# Patient Record
Sex: MALE | Race: WHITE | Employment: OTHER | URBAN - METROPOLITAN AREA
[De-identification: names, ages, dates, MRNs, and addresses within clinical notes are randomized per-mention and may not be internally consistent; named-entity substitution may affect disease eponyms.]

---

## 2020-04-05 ENCOUNTER — APPOINTMENT (OUTPATIENT)
Dept: CT IMAGING | Age: 65
End: 2020-04-05
Attending: EMERGENCY MEDICINE
Payer: MEDICARE

## 2020-04-05 ENCOUNTER — HOSPITAL ENCOUNTER (EMERGENCY)
Age: 65
Discharge: HOME | End: 2020-04-05
Attending: EMERGENCY MEDICINE
Payer: MEDICARE

## 2020-04-05 VITALS
HEART RATE: 99 BPM | OXYGEN SATURATION: 98 % | TEMPERATURE: 98.1 F | RESPIRATION RATE: 16 BRPM | DIASTOLIC BLOOD PRESSURE: 77 MMHG | SYSTOLIC BLOOD PRESSURE: 139 MMHG

## 2020-04-05 DIAGNOSIS — S09.90XA CLOSED HEAD INJURY, INITIAL ENCOUNTER: Primary | ICD-10-CM

## 2020-04-05 PROCEDURE — 90715 TDAP VACCINE 7 YRS/> IM: CPT | Performed by: EMERGENCY MEDICINE

## 2020-04-05 PROCEDURE — 74011250636 HC RX REV CODE- 250/636: Performed by: EMERGENCY MEDICINE

## 2020-04-05 PROCEDURE — 90471 IMMUNIZATION ADMIN: CPT

## 2020-04-05 PROCEDURE — 74011000250 HC RX REV CODE- 250: Performed by: EMERGENCY MEDICINE

## 2020-04-05 PROCEDURE — 70450 CT HEAD/BRAIN W/O DYE: CPT

## 2020-04-05 PROCEDURE — 99285 EMERGENCY DEPT VISIT HI MDM: CPT

## 2020-04-05 RX ORDER — BACITRACIN 500 UNIT/G
1 PACKET (EA) TOPICAL
Status: COMPLETED | OUTPATIENT
Start: 2020-04-05 | End: 2020-04-05

## 2020-04-05 RX ADMIN — TETANUS TOXOID, REDUCED DIPHTHERIA TOXOID AND ACELLULAR PERTUSSIS VACCINE, ADSORBED 0.5 ML: 5; 2.5; 8; 8; 2.5 SUSPENSION INTRAMUSCULAR at 11:58

## 2020-04-05 RX ADMIN — BACITRACIN 1 PACKET: 500 OINTMENT TOPICAL at 11:57

## 2020-04-05 NOTE — ED PROVIDER NOTES
HPI   The patient is a 68-year-old white male who had been drinking alcohol came back from the grocery store and fell striking his head and face on a sidewalk. It was uncertain if he had a loss of consciousness he has been awake and alert though somewhat intoxicated since then. He has abrasion to his right eyebrow and his nose is swollen but he has no other complaints at this time. He denies any neck pain or tenderness. Past Medical History:   Diagnosis Date    Depression     Hypertension     Stroke [facility])     tia behind his eye branch retina artery       History reviewed. No pertinent surgical history.       Family History:   Problem Relation Age of Onset    Colon Cancer Father     Heart Failure Father     Heart Disease Father     Diabetes Father     Diabetes Brother     Heart Attack Brother     Hypertension Brother     Diabetes Mother        Social History     Socioeconomic History    Marital status:      Spouse name: Not on file    Number of children: Not on file    Years of education: Not on file    Highest education level: Not on file   Occupational History    Not on file   Social Needs    Financial resource strain: Not on file    Food insecurity     Worry: Not on file     Inability: Not on file   [employer] needs     Medical: Not on file     Non-medical: Not on file   Tobacco Use    Smoking status: Never Smoker    Smokeless tobacco: Never Used   Substance and Sexual Activity    Alcohol use: Yes     Comment: pt states \"im not sure\"    Drug use: No    Sexual activity: Not on file   Lifestyle    Physical activity     Days per week: Not on file     Minutes per session: Not on file    Stress: Not on file   Relationships    Social connections     Talks on phone: Not on file     Gets together: Not on file     Attends Synagogue service: Not on file     Active member of club or organization: Not on file     Attends meetings of clubs or organizations: Not on file     Relationship status: Not on file    Intimate partner violence     Fear of current or ex partner: Not on file     Emotionally abused: Not on file     Physically abused: Not on file     Forced sexual activity: Not on file   Other Topics Concern    Not on file   Social History Narrative    Not on file         ALLERGIES: Patient has no known allergies. Review of Systems   All other systems reviewed and are negative. Vitals:    04/05/20 1132 04/05/20 1140   BP: (!) 152/91 (!) 154/93   Pulse: 99    Resp: 16    Temp: 98.1 °F (36.7 °C)    SpO2: 95% 93%            Physical Exam  Vitals signs reviewed. HENT:      Head: Normocephalic. Comments: There is a abrasion over his right forehead his nose is slightly tender but not deformed     Mouth/Throat:      Mouth: Mucous membranes are moist.   Eyes:      Pupils: Pupils are equal, round, and reactive to light. Musculoskeletal: Normal range of motion. Skin:     General: Skin is warm. Capillary Refill: Capillary refill takes less than 2 seconds. Neurological:      General: No focal deficit present. Mental Status: He is alert. Psychiatric:         Mood and Affect: Mood normal.         Thought Content:  Thought content normal.          MDM       Procedures

## 2020-04-05 NOTE — ED NOTES
Pt ambulated in landis gait steady. Pt offered ginger ale or water to drink, declined. Pt resting on stretcher respirations even and non labored. Call bell in reach, use explained.

## 2020-04-05 NOTE — DISCHARGE INSTRUCTIONS
Patient Education        Concussion in Children: Care Instructions  Your Care Instructions    A concussion is a kind of injury to the brain. It happens when the head or body receives a hard blow. The impact can jar or shake the brain against the skull. This interrupts the brain's normal activities. Although your child may have cuts or bruises on the head or face, he or she may have no other visible signs of a brain injury. Your child may not have a CT or MRI scan. Damage to the brain from a concussion can't be seen in these tests. Also, CT and MRI scans have risks. Any child who has had a concussion at a sports event needs to stop all activity and not return to play. Being active again before the brain recovers can raise your child's risk of having a more serious brain injury. For a few weeks, your child may have low energy, dizziness, trouble sleeping, a headache, ringing in the ears, or nausea. Your child may also feel anxious, grumpy, or depressed. He or she may have problems with memory and concentration. These symptoms are common after a concussion. They should slowly improve over time. Sometimes this takes weeks or even months. Follow-up care is a key part of your child's treatment and safety. Be sure to make and go to all appointments, and call your doctor if your child is having problems. It's also a good idea to know your child's test results and keep a list of the medicines your child takes. How can you care for your child at home? Pain control  · Use ice or a cold pack for 10 to 20 minutes at a time on the part of your child's head that hurts. Put a thin cloth between the ice and your child's skin. · Be safe with medicines. Read and follow all instructions on the label. ? If the doctor gave your child a prescription medicine for pain, give it as prescribed. ? Talk to your doctor before you give your child prescription or over-the-counter medicines like Tylenol.  Pain medicines can make headaches more likely. At home  · Help your child rest his or her body and brain. Most experts agree that children should rest for 1 to 2 days. Let your child know that rest--even though it can be hard--can speed up recovery. ? Pay close attention to symptoms as your child slowly returns to his or her regular routine. Avoid anything that makes symptoms worse or causes new ones. ? Make sure your child gets plenty of sleep. It may help to keep your child's room quiet, dark or dimly lit, and cool. Have your child go to bed and get up at the same time, and limit foods and drinks with caffeine. ? Limit housework, homework, and screen time. ? Avoid activities that could lead to another head injury. ? Follow your doctor's instructions for a gradual return to activity and sports. Back to school  · Wait until your child can focus for 30 to 45 minutes at a time before you send your child back to school. · Tell teachers, administrators, school counselors, and nurses what symptoms your child has or could develop. Sign a release form so the school can coordinate care with your child's doctor. · Arrange for any special changes your child needs. For example, depending on symptoms, your child may need to:  ? Start back to school with shorter days. ? Take 15-minute breaks after every 30 minutes of classwork.  ? Have more time for assignments, postpone tests, or have another student take notes. ? Avoid bright lights. (You can suggest dimmed lighting or that your child wear sunglasses.)  ? Avoid noisy places, like the gym or cafeteria. · Check in with school staff often. Discuss how your child is doing, academically and emotionally. A concussion can make kids grouchy and emotional. And needing extra help or extra rest can be hard for some kids. · If your child doesn't recover within 3 to 4 weeks, talk with your doctor and the school staff. They may recommend a 504 plan.  It's a plan for kids who need ongoing adjustments at school. How should your child return to play? Doctors and concussion specialists suggest steps to follow for returning to sports after a concussion. Use these steps as a guide. In most places, your doctor must give you written permission for your child to begin the steps and return to sports. This means that your child must have no symptoms, is back to school, and is no longer taking medicines for the concussion. Your child should slowly progress through the following levels of activity:  1. Limited activity. Your child can take part in daily activities as long as the activity doesn't increase his or her symptoms or cause new symptoms. 2. Light aerobic activity. This can include walking, swimming, or other exercise at less than 70% of your child's maximum heart rate. No resistance training is included in this step. 3. Sport-specific exercise. This includes running drills or skating drills (depending on the sport), but no head impact. 4. Noncontact training drills. This includes more complex training drills such as passing. Your child may also begin light resistance training. 5. Full-contact practice. Your child can participate in normal training. 6. Return to normal game play. This is the final step and allows your child to join in normal game play. Watch and keep track of your child's progress. It should take at least 6 days for your child to go from light activity to normal game play. Make sure that your child can stay at each new level of activity for at least 24 hours without symptoms, or as long as your doctor says, before doing more. If one or more symptoms come back, have your child return to a lower level of activity for at least 24 hours. He or she should not move on until all symptoms are gone. When should you call for help? Call 911 anytime you think your child may need emergency care. For example, call if:    · Your child has a seizure.     · Your child passes out (loses consciousness).      · Your child is confused or hard to wake up.   [facility] your doctor now or seek immediate medical care if:    · Your child has new or worse vomiting.     · Your child seems less alert.     · Your child has new weakness or numbness in any part of the body.    Watch closely for changes in your child's health, and be sure to contact your doctor if:    · Your child does not get better as expected.     · Your child has new symptoms, such as headaches, trouble concentrating, or changes in mood. Where can you learn more? Go to http://carolyn-enrico.info/  Enter R145 in the search box to learn more about \"Concussion in Children: Care Instructions. \"  Current as of: November 19, 2019Content Version: 12.4  © 1494-3635 Healthwise, Incorporated. Care instructions adapted under license by [employer] (which disclaims liability or warranty for this information). If you have questions about a medical condition or this instruction, always ask your healthcare professional. [place] [address] any warranty or liability for your use of this information. Patient Education        Learning About a Closed Head Injury  What is a closed head injury? A closed head injury happens when your head gets hit hard. The strong force of the blow causes your brain to shake in your skull. This movement can cause the brain to bruise, swell, or tear. Sometimes nerves or blood vessels also get damaged. This can cause bleeding in or around the brain. A concussion is a type of closed head injury. What are the symptoms? If you have a mild concussion, you may have a mild headache or feel \"not quite right. \" These symptoms are common. They usually go away over a few days to 4 weeks. But sometimes after a concussion, you feel like you can't function as well as before the injury. And you have new symptoms. This is called postconcussive syndrome.  You may:  · Find it harder to solve problems, think, concentrate, or remember. · Have headaches. · Have changes in your sleep patterns, such as not being able to sleep or sleeping all the time. · Have changes in your personality. · Not be interested in your usual activities. · Feel angry or anxious without a clear reason. · Lose your sense of taste or smell. · Be dizzy, lightheaded, or unsteady. It may be hard to stand or walk. How is a closed head injury treated? Any person who may have a concussion needs to see a doctor. Some people have to stay in the hospital to be watched. Others can go home safely. If you go home, follow your doctor's instructions. He or she will tell you if you need someone to watch you closely for the next 24 hours or longer. Rest is the best treatment. Get plenty of sleep at night. And try to rest during the day. · Avoid activities that are physically or mentally demanding. These include housework, exercise, and schoolwork. And don't play video games, send text messages, or use the computer. You may need to change your school or work schedule to be able to avoid these activities. · Ask your doctor when it's okay to drive, ride a bike, or operate machinery. · Take an over-the-counter pain medicine, such as acetaminophen (Tylenol), ibuprofen (Advil, Motrin), or naproxen (Aleve). Be safe with medicines. Read and follow all instructions on the label. · Check with your doctor before you use any other medicines for pain. · Do not drink alcohol or use illegal drugs. They can slow recovery. They can also increase your risk of getting a second head injury. Follow-up care is a key part of your treatment and safety. Be sure to make and go to all appointments, and call your doctor if you are having problems. It's also a good idea to know your test results and keep a list of the medicines you take. Where can you learn more?   Go to http://carolyn-enrico.info/  Enter E235 in the search box to learn more about \"Learning About a Closed Head Injury. \"  Current as of: November 19, 2019Content Version: 12.4  © 4137-9178 [employer]. Care instructions adapted under license by [employer] (which disclaims liability or warranty for this information). If you have questions about a medical condition or this instruction, always ask your healthcare professional. [place] [address] any warranty or liability for your use of this information.

## 2020-04-05 NOTE — ED NOTES
Pt was discharged and given instructions by Dr Gulshan Dee. Pt verbalized good understanding of all discharge instructions, and F/U care. All questions answered. Pt is awaiting cab ride home. Pt  Is in  stable condition for  discharge.

## 2020-04-05 NOTE — ED TRIAGE NOTES
Pt arrived via [employer] squad report of \"pt has been drinking alcohol this morning. Pt was seen falling on concrete and 911 was called. \" Pt denies passing out. Pt denies taking blood thinners. Pt has bruising and swelling with abrasions to right face and forehead appears calm and in no distress at this time.

## 2022-02-25 ENCOUNTER — HOSPITAL ENCOUNTER (OUTPATIENT)
Dept: RADIATION THERAPY | Age: 67
Discharge: HOME | End: 2022-02-25

## 2022-07-13 ENCOUNTER — HOSPITAL ENCOUNTER (OUTPATIENT)
Dept: RADIATION THERAPY | Age: 67
Discharge: HOME | End: 2022-07-13

## 2022-07-13 VITALS — HEIGHT: 71 IN | WEIGHT: 216 LBS | BODY MASS INDEX: 30.24 KG/M2

## 2022-07-13 RX ORDER — HYDROXYZINE HYDROCHLORIDE 10 MG/1
10 TABLET, FILM COATED ORAL
COMMUNITY

## 2022-07-13 RX ORDER — FOLIC ACID 1 MG/1
1 TABLET ORAL DAILY
COMMUNITY

## 2022-07-13 RX ORDER — METOPROLOL TARTRATE 50 MG/1
50 TABLET ORAL 2 TIMES DAILY
COMMUNITY

## 2023-01-06 ENCOUNTER — HOSPITAL ENCOUNTER (OUTPATIENT)
Dept: RADIATION THERAPY | Age: 68
Discharge: HOME | End: 2023-01-06

## 2023-07-25 ENCOUNTER — HOSPITAL ENCOUNTER (OUTPATIENT)
Facility: HOSPITAL | Age: 68
Discharge: HOME | End: 2023-07-28

## 2024-02-16 ENCOUNTER — OFFICE VISIT (OUTPATIENT)
Facility: CLINIC | Age: 69
End: 2024-02-16
Payer: MEDICARE

## 2024-02-16 VITALS
HEART RATE: 90 BPM | TEMPERATURE: 98 F | OXYGEN SATURATION: 99 % | HEIGHT: 71 IN | SYSTOLIC BLOOD PRESSURE: 137 MMHG | BODY MASS INDEX: 30.52 KG/M2 | WEIGHT: 218 LBS | DIASTOLIC BLOOD PRESSURE: 85 MMHG | RESPIRATION RATE: 20 BRPM

## 2024-02-16 DIAGNOSIS — E78.2 MIXED HYPERLIPIDEMIA: ICD-10-CM

## 2024-02-16 DIAGNOSIS — Z85.46 PERSONAL HISTORY OF PROSTATE CANCER: ICD-10-CM

## 2024-02-16 DIAGNOSIS — R01.1 HEART MURMUR, SYSTOLIC: ICD-10-CM

## 2024-02-16 DIAGNOSIS — I10 PRIMARY HYPERTENSION: Primary | ICD-10-CM

## 2024-02-16 DIAGNOSIS — Z87.898 HISTORY OF ALCOHOL USE DISORDER: ICD-10-CM

## 2024-02-16 DIAGNOSIS — L40.9 PSORIASIS: ICD-10-CM

## 2024-02-16 DIAGNOSIS — E55.9 VITAMIN D DEFICIENCY: ICD-10-CM

## 2024-02-16 PROCEDURE — G8417 CALC BMI ABV UP PARAM F/U: HCPCS | Performed by: FAMILY MEDICINE

## 2024-02-16 PROCEDURE — G8427 DOCREV CUR MEDS BY ELIG CLIN: HCPCS | Performed by: FAMILY MEDICINE

## 2024-02-16 PROCEDURE — 1123F ACP DISCUSS/DSCN MKR DOCD: CPT | Performed by: FAMILY MEDICINE

## 2024-02-16 PROCEDURE — 3017F COLORECTAL CA SCREEN DOC REV: CPT | Performed by: FAMILY MEDICINE

## 2024-02-16 PROCEDURE — G8484 FLU IMMUNIZE NO ADMIN: HCPCS | Performed by: FAMILY MEDICINE

## 2024-02-16 PROCEDURE — 3079F DIAST BP 80-89 MM HG: CPT | Performed by: FAMILY MEDICINE

## 2024-02-16 PROCEDURE — 99204 OFFICE O/P NEW MOD 45 MIN: CPT | Performed by: FAMILY MEDICINE

## 2024-02-16 PROCEDURE — 3075F SYST BP GE 130 - 139MM HG: CPT | Performed by: FAMILY MEDICINE

## 2024-02-16 PROCEDURE — 4004F PT TOBACCO SCREEN RCVD TLK: CPT | Performed by: FAMILY MEDICINE

## 2024-02-16 RX ORDER — METOPROLOL TARTRATE 50 MG/1
50 TABLET, FILM COATED ORAL 2 TIMES DAILY
Qty: 180 TABLET | Refills: 3 | Status: SHIPPED | OUTPATIENT
Start: 2024-02-16

## 2024-02-16 RX ORDER — VALSARTAN 80 MG/1
80 TABLET ORAL DAILY
Qty: 90 TABLET | Refills: 3 | Status: SHIPPED | OUTPATIENT
Start: 2024-02-16

## 2024-02-16 RX ORDER — ACETAMINOPHEN 160 MG
TABLET,DISINTEGRATING ORAL
COMMUNITY

## 2024-02-16 RX ORDER — AMLODIPINE BESYLATE 10 MG/1
10 TABLET ORAL NIGHTLY
Qty: 90 TABLET | Refills: 3 | Status: SHIPPED | OUTPATIENT
Start: 2024-02-16

## 2024-02-16 NOTE — ASSESSMENT & PLAN NOTE
Likely sclerotic aortic valve. Asymptomatic. Recent imaging reassuring. Continue to monitor, consider re-imaging if having new symptoms or worsening murmur.

## 2024-02-16 NOTE — PROGRESS NOTES
Chief Complaint   Patient presents with    New Patient     Gen health is good has no issues or concerns at this time.

## 2024-02-16 NOTE — PROGRESS NOTES
Family Medicine Initial Office Visit  Patient: Jose Mc  1955, 68 y.o., male  Encounter Date: 2/16/2024    ASSESSMENT & PLAN  Jose Mc is a 68 y.o. male who presented for established care with below concerns:    1. Primary hypertension  Overview:  BP Readings from Last 3 Encounters:   02/16/24 137/85     Medication: Valsartan 80mg daily, metoprolol tartrate 50mg BID, amlodipine 10mg daily  Assessment & Plan:   Well-controlled, continue current medications and continue current treatment plan  Orders:  -     metoprolol tartrate (LOPRESSOR) 50 MG tablet; Take 1 tablet by mouth 2 times daily, Disp-180 tablet, R-3Normal  -     valsartan (DIOVAN) 80 MG tablet; Take 1 tablet by mouth daily, Disp-90 tablet, R-3Normal  -     amLODIPine (NORVASC) 10 MG tablet; Take 1 tablet by mouth nightly, Disp-90 tablet, R-3Normal  -     Thyroid Cascade Profile; Future  -     Comprehensive Metabolic Panel; Future  -     CBC; Future  2. Mixed hyperlipidemia  Overview:  No results found for: \"CHOL\"  No results found for: \"TRIG\"  No results found for: \"HDL\"  No results found for: \"LDLCHOLESTEROL\", \"LDLCALC\"  No results found for: \"VLDL\"  No results found for: \"CHOLHDLRATIO\"    Current Meds: none  Previous Meds: simvastatin 40mg daily  Assessment & Plan:   Unclear control, continue current plan pending work up below  Orders:  -     Lipid Panel; Future  3. History of alcohol use disorder  Overview:  Sober since approximately 2022.      Assessment & Plan:   Well-controlled, continue current treatment plan  4. Personal history of prostate cancer  Overview:  Dx and treated in 2022, in remission x 1 year.     Specialist: [facility] Dr. Friedman    Interval Hx:  - doing well, in remission, PSA went from 11 to 0.5 with radiation.  - no residual symptoms.  Assessment & Plan:   Monitored by specialist- no acute findings meriting change in the plan  5. Psoriasis  Overview:  R lower leg, shoulders, scalp, elbows.    Medication: coconut

## 2024-02-16 NOTE — ASSESSMENT & PLAN NOTE
Well-controlled, continue current medications. Offered betamethasone cream but not interested at this time, will let me know.

## 2024-02-29 DIAGNOSIS — I10 PRIMARY HYPERTENSION: ICD-10-CM

## 2024-02-29 DIAGNOSIS — E78.2 MIXED HYPERLIPIDEMIA: ICD-10-CM

## 2024-02-29 DIAGNOSIS — E55.9 VITAMIN D DEFICIENCY: ICD-10-CM

## 2024-03-01 LAB
25(OH)D3 SERPL-MCNC: 44.6 NG/ML (ref 30–100)
ALBUMIN SERPL-MCNC: 3.4 G/DL (ref 3.5–5)
ALBUMIN/GLOB SERPL: 0.8 (ref 1.1–2.2)
ALP SERPL-CCNC: 78 U/L (ref 45–117)
ALT SERPL-CCNC: 29 U/L (ref 12–78)
ANION GAP SERPL CALC-SCNC: 6 MMOL/L (ref 5–15)
AST SERPL-CCNC: 24 U/L (ref 15–37)
BILIRUB SERPL-MCNC: 0.9 MG/DL (ref 0.2–1)
BUN SERPL-MCNC: 14 MG/DL (ref 6–20)
BUN/CREAT SERPL: 13 (ref 12–20)
CALCIUM SERPL-MCNC: 8.7 MG/DL (ref 8.5–10.1)
CHLORIDE SERPL-SCNC: 101 MMOL/L (ref 97–108)
CHOLEST SERPL-MCNC: 221 MG/DL
CO2 SERPL-SCNC: 27 MMOL/L (ref 21–32)
CREAT SERPL-MCNC: 1.08 MG/DL (ref 0.7–1.3)
ERYTHROCYTE [DISTWIDTH] IN BLOOD BY AUTOMATED COUNT: 14.8 % (ref 11.5–14.5)
GLOBULIN SER CALC-MCNC: 4.3 G/DL (ref 2–4)
GLUCOSE SERPL-MCNC: 129 MG/DL (ref 65–100)
HCT VFR BLD AUTO: 40.3 % (ref 36.6–50.3)
HDLC SERPL-MCNC: 61 MG/DL
HDLC SERPL: 3.6 (ref 0–5)
HGB BLD-MCNC: 13.4 G/DL (ref 12.1–17)
LDLC SERPL CALC-MCNC: 136 MG/DL (ref 0–100)
MCH RBC QN AUTO: 31.5 PG (ref 26–34)
MCHC RBC AUTO-ENTMCNC: 33.3 G/DL (ref 30–36.5)
MCV RBC AUTO: 94.6 FL (ref 80–99)
NRBC # BLD: 0 K/UL (ref 0–0.01)
NRBC BLD-RTO: 0 PER 100 WBC
PLATELET # BLD AUTO: 210 K/UL (ref 150–400)
PMV BLD AUTO: 10.8 FL (ref 8.9–12.9)
POTASSIUM SERPL-SCNC: 3.7 MMOL/L (ref 3.5–5.1)
PROT SERPL-MCNC: 7.7 G/DL (ref 6.4–8.2)
RBC # BLD AUTO: 4.26 M/UL (ref 4.1–5.7)
SODIUM SERPL-SCNC: 134 MMOL/L (ref 136–145)
TRIGL SERPL-MCNC: 120 MG/DL
VLDLC SERPL CALC-MCNC: 24 MG/DL
WBC # BLD AUTO: 7.5 K/UL (ref 4.1–11.1)

## 2024-03-02 LAB — TSH SERPL DL<=0.05 MIU/L-ACNC: 3.29 UIU/ML (ref 0.45–4.5)

## 2024-03-06 NOTE — RESULT ENCOUNTER NOTE
Please contact patient to let him know cholesterol is high and sugars are high (prediabetes). I would like him to repeat labs in 6 months after working on diet changes to low carbohydrate, healthy fats, high vegetable and fruit diet. Refer him to mediterranean diet and high fiber diet. Labs will be placed for 6 months out.

## 2024-04-01 DIAGNOSIS — C61 MALIGNANT NEOPLASM OF PROSTATE (HCC): Primary | ICD-10-CM

## 2024-05-16 ENCOUNTER — HOSPITAL ENCOUNTER (OUTPATIENT)
Facility: HOSPITAL | Age: 69
End: 2024-05-16

## 2024-05-16 VITALS — DIASTOLIC BLOOD PRESSURE: 96 MMHG | BODY MASS INDEX: 29.29 KG/M2 | SYSTOLIC BLOOD PRESSURE: 156 MMHG | WEIGHT: 210 LBS

## 2024-05-16 DIAGNOSIS — C61 MALIGNANT NEOPLASM OF PROSTATE (HCC): Primary | ICD-10-CM

## 2024-05-16 ASSESSMENT — PAIN SCALES - GENERAL: PAINLEVEL_OUTOF10: 0

## 2024-05-16 NOTE — PROGRESS NOTES
Cancer [place] [facility]  [facility]      RADIATION ONCOLOGY FOLLOW-UP VISIT NOTE     Encounter Date: 05/16/24   Patient Name: Jose Mc  YOB: 1955  Medical Record Number: [id number]  Referring Physician: Kentrell Friedman MD  Primary Care Provider: Lupillo Hobbs MD      DIAGNOSIS:       ICD-10-CM    1. Malignant neoplasm of prostate (HCC)  C61         STAGING:    Cancer Staging   Malignant neoplasm of prostate (HCC)  Staging form: Prostate, AJCC 8th Edition  - Clinical stage from 11/29/2021: Stage IIA (cT2b, cN0, cM0, PSA: 9.9, Grade Group: 1) - Signed by Glen Gutierrez MD on 5/16/2024  AJCC Staging has been reviewed      ONCOLOGIC HISTORY:   PROSTATE CANCER HISTORY:  Patient initially seen by urology in 10/2021 for an elevated PSA.  PSA was 7.3 in 7/2021 and then 9.88 in 10/2021.  He denies a family history of prostate cancer.  Had slightly abnormal TERESITA with subtly more prominent right mid gland.  History of HTN and alcoholism.    11/29/21 - TRUS 12 core prostate biopsy shows a prostate volume of 37.81cc, cT2b (firm over entire right side), PSA density 0.26:        - Right apex medial: benign       - Right apex lateral:   adenocarcinoma, [place] 3+3=6 involving <5% of 1/1 core.       - Right mid medial:   adenocarcinoma, [place] 3+3=6 involving 10% of 1/1 core.       - Right mid lateral:   adenocarcinoma, [place] 3+3=6 involving 20% of 1/1 core. PNI+       - Right base medial:  benign       - Right base lateral:   benign       - Left apex medial: benign       - Left apex lateral:   adenocarcinoma, [place] 3+3=6 involving <5% of 1/1 core.       - Left mid medial: benign       - Left mid lateral: adenocarcinoma, [place] 3+3=6 involving <5% of 1/1 core.       - Left base medial:  benign       - Left base lateral:  benign  Patient elects active surveillance.  1/6/22 - Oncotype Dx GPS 30 (30% risk of adverse pathology).  6/10/22 - MRI pelvis ([facility]) shows a

## 2025-01-31 ENCOUNTER — HOSPITAL ENCOUNTER (INPATIENT)
Facility: HOSPITAL | Age: 70
LOS: 4 days | Discharge: ANOTHER HOSPITAL | DRG: 871 | End: 2025-02-04
Attending: STUDENT IN AN ORGANIZED HEALTH CARE EDUCATION/TRAINING PROGRAM | Admitting: FAMILY MEDICINE
Payer: MEDICARE

## 2025-01-31 ENCOUNTER — APPOINTMENT (OUTPATIENT)
Facility: HOSPITAL | Age: 70
DRG: 871 | End: 2025-01-31
Payer: MEDICARE

## 2025-01-31 DIAGNOSIS — J90 PLEURAL EFFUSION: ICD-10-CM

## 2025-01-31 DIAGNOSIS — J18.9 COMMUNITY ACQUIRED PNEUMONIA OF RIGHT LUNG, UNSPECIFIED PART OF LUNG: Primary | ICD-10-CM

## 2025-01-31 DIAGNOSIS — I27.82 CHRONIC PULMONARY EMBOLISM, UNSPECIFIED PULMONARY EMBOLISM TYPE, UNSPECIFIED WHETHER ACUTE COR PULMONALE PRESENT (HCC): ICD-10-CM

## 2025-01-31 LAB
ALBUMIN SERPL-MCNC: 1.4 G/DL (ref 3.5–5)
ALBUMIN SERPL-MCNC: 1.6 G/DL (ref 3.5–5)
ALBUMIN/GLOB SERPL: 0.3 (ref 1.1–2.2)
ALBUMIN/GLOB SERPL: 0.3 (ref 1.1–2.2)
ALP SERPL-CCNC: 74 U/L (ref 45–117)
ALP SERPL-CCNC: 93 U/L (ref 45–117)
ALT SERPL-CCNC: 11 U/L (ref 12–78)
ALT SERPL-CCNC: 12 U/L (ref 12–78)
ANION GAP SERPL CALC-SCNC: 11 MMOL/L (ref 2–12)
ANION GAP SERPL CALC-SCNC: 16 MMOL/L (ref 2–12)
APPEARANCE UR: CLEAR
APTT PPP: 29.4 SEC (ref 22.1–31)
AST SERPL-CCNC: 11 U/L (ref 15–37)
AST SERPL-CCNC: 12 U/L (ref 15–37)
BACTERIA URNS QL MICRO: NEGATIVE /HPF
BASOPHILS # BLD: 0.06 K/UL (ref 0–0.1)
BASOPHILS NFR BLD: 0.5 % (ref 0–1)
BILIRUB SERPL-MCNC: 0.6 MG/DL (ref 0.2–1)
BILIRUB SERPL-MCNC: 0.6 MG/DL (ref 0.2–1)
BILIRUB UR QL: NEGATIVE
BUN SERPL-MCNC: 11 MG/DL (ref 6–20)
BUN SERPL-MCNC: 12 MG/DL (ref 6–20)
BUN/CREAT SERPL: 13 (ref 12–20)
BUN/CREAT SERPL: 16 (ref 12–20)
CALCIUM SERPL-MCNC: 8.4 MG/DL (ref 8.5–10.1)
CALCIUM SERPL-MCNC: 8.6 MG/DL (ref 8.5–10.1)
CHLORIDE SERPL-SCNC: 103 MMOL/L (ref 97–108)
CHLORIDE SERPL-SCNC: 109 MMOL/L (ref 97–108)
CO2 SERPL-SCNC: 22 MMOL/L (ref 21–32)
CO2 SERPL-SCNC: 22 MMOL/L (ref 21–32)
COLOR UR: ABNORMAL
COMMENT:: NORMAL
CREAT SERPL-MCNC: 0.7 MG/DL (ref 0.7–1.3)
CREAT SERPL-MCNC: 0.9 MG/DL (ref 0.7–1.3)
D DIMER PPP FEU-MCNC: 3.6 MG/L FEU (ref 0–0.65)
DIFFERENTIAL METHOD BLD: ABNORMAL
EOSINOPHIL # BLD: 0.03 K/UL (ref 0–0.4)
EOSINOPHIL NFR BLD: 0.2 % (ref 0–7)
EPITH CASTS URNS QL MICRO: ABNORMAL /LPF
ERYTHROCYTE [DISTWIDTH] IN BLOOD BY AUTOMATED COUNT: 16.6 % (ref 11.5–14.5)
ERYTHROCYTE [DISTWIDTH] IN BLOOD BY AUTOMATED COUNT: 16.6 % (ref 11.5–14.5)
GLOBULIN SER CALC-MCNC: 5.2 G/DL (ref 2–4)
GLOBULIN SER CALC-MCNC: 6.1 G/DL (ref 2–4)
GLUCOSE SERPL-MCNC: 70 MG/DL (ref 65–100)
GLUCOSE SERPL-MCNC: 84 MG/DL (ref 65–100)
GLUCOSE UR STRIP.AUTO-MCNC: NEGATIVE MG/DL
HCT VFR BLD AUTO: 28.8 % (ref 36.6–50.3)
HCT VFR BLD AUTO: 30.4 % (ref 36.6–50.3)
HGB BLD-MCNC: 8.5 G/DL (ref 12.1–17)
HGB BLD-MCNC: 9.2 G/DL (ref 12.1–17)
HGB UR QL STRIP: NEGATIVE
HYALINE CASTS URNS QL MICRO: ABNORMAL /LPF (ref 0–2)
IMM GRANULOCYTES # BLD AUTO: 0.11 K/UL (ref 0–0.04)
IMM GRANULOCYTES NFR BLD AUTO: 0.8 % (ref 0–0.5)
INR PPP: 1.2 (ref 0.9–1.1)
KETONES UR QL STRIP.AUTO: 15 MG/DL
LACTATE SERPL-SCNC: 1.4 MMOL/L (ref 0.4–2)
LACTATE SERPL-SCNC: 2.2 MMOL/L (ref 0.4–2)
LEUKOCYTE ESTERASE UR QL STRIP.AUTO: NEGATIVE
LYMPHOCYTES # BLD: 0.91 K/UL (ref 0.8–3.5)
LYMPHOCYTES NFR BLD: 6.9 % (ref 12–49)
MCH RBC QN AUTO: 26.2 PG (ref 26–34)
MCH RBC QN AUTO: 26.5 PG (ref 26–34)
MCHC RBC AUTO-ENTMCNC: 29.5 G/DL (ref 30–36.5)
MCHC RBC AUTO-ENTMCNC: 30.3 G/DL (ref 30–36.5)
MCV RBC AUTO: 87.6 FL (ref 80–99)
MCV RBC AUTO: 88.6 FL (ref 80–99)
MONOCYTES # BLD: 0.83 K/UL (ref 0–1)
MONOCYTES NFR BLD: 6.3 % (ref 5–13)
NEUTS SEG # BLD: 11.28 K/UL (ref 1.8–8)
NEUTS SEG NFR BLD: 85.3 % (ref 32–75)
NITRITE UR QL STRIP.AUTO: NEGATIVE
NRBC # BLD: 0 K/UL (ref 0–0.01)
NRBC # BLD: 0 K/UL (ref 0–0.01)
NRBC BLD-RTO: 0 PER 100 WBC
NRBC BLD-RTO: 0 PER 100 WBC
NT PRO BNP: 1512 PG/ML (ref 0–125)
PH UR STRIP: 5 (ref 5–8)
PLATELET # BLD AUTO: 369 K/UL (ref 150–400)
PLATELET # BLD AUTO: 391 K/UL (ref 150–400)
PMV BLD AUTO: 10 FL (ref 8.9–12.9)
PMV BLD AUTO: 9.7 FL (ref 8.9–12.9)
POTASSIUM SERPL-SCNC: 3.4 MMOL/L (ref 3.5–5.1)
POTASSIUM SERPL-SCNC: 3.6 MMOL/L (ref 3.5–5.1)
PROT SERPL-MCNC: 6.6 G/DL (ref 6.4–8.2)
PROT SERPL-MCNC: 7.7 G/DL (ref 6.4–8.2)
PROT UR STRIP-MCNC: 30 MG/DL
PROTHROMBIN TIME: 12.7 SEC (ref 9.2–11.2)
RBC # BLD AUTO: 3.25 M/UL (ref 4.1–5.7)
RBC # BLD AUTO: 3.47 M/UL (ref 4.1–5.7)
RBC #/AREA URNS HPF: ABNORMAL /HPF (ref 0–5)
SODIUM SERPL-SCNC: 141 MMOL/L (ref 136–145)
SODIUM SERPL-SCNC: 142 MMOL/L (ref 136–145)
SP GR UR REFRACTOMETRY: 1.01 (ref 1–1.03)
SPECIMEN HOLD: NORMAL
THERAPEUTIC RANGE: NORMAL SECS (ref 58–77)
TROPONIN I SERPL HS-MCNC: 13 NG/L (ref 0–76)
TROPONIN I SERPL HS-MCNC: 14 NG/L (ref 0–76)
UFH PPP CHRO-ACNC: <0.1 IU/ML
URINE CULTURE IF INDICATED: ABNORMAL
UROBILINOGEN UR QL STRIP.AUTO: 2 EU/DL (ref 0.2–1)
WBC # BLD AUTO: 13.2 K/UL (ref 4.1–11.1)
WBC # BLD AUTO: 13.2 K/UL (ref 4.1–11.1)
WBC URNS QL MICRO: ABNORMAL /HPF (ref 0–4)

## 2025-01-31 PROCEDURE — 36415 COLL VENOUS BLD VENIPUNCTURE: CPT

## 2025-01-31 PROCEDURE — 85379 FIBRIN DEGRADATION QUANT: CPT

## 2025-01-31 PROCEDURE — 85520 HEPARIN ASSAY: CPT

## 2025-01-31 PROCEDURE — 87040 BLOOD CULTURE FOR BACTERIA: CPT

## 2025-01-31 PROCEDURE — 93005 ELECTROCARDIOGRAM TRACING: CPT | Performed by: FAMILY MEDICINE

## 2025-01-31 PROCEDURE — 83605 ASSAY OF LACTIC ACID: CPT

## 2025-01-31 PROCEDURE — 2060000000 HC ICU INTERMEDIATE R&B

## 2025-01-31 PROCEDURE — 6370000000 HC RX 637 (ALT 250 FOR IP)

## 2025-01-31 PROCEDURE — 85610 PROTHROMBIN TIME: CPT

## 2025-01-31 PROCEDURE — 80053 COMPREHEN METABOLIC PANEL: CPT

## 2025-01-31 PROCEDURE — 6360000002 HC RX W HCPCS

## 2025-01-31 PROCEDURE — 6360000002 HC RX W HCPCS: Performed by: STUDENT IN AN ORGANIZED HEALTH CARE EDUCATION/TRAINING PROGRAM

## 2025-01-31 PROCEDURE — 83880 ASSAY OF NATRIURETIC PEPTIDE: CPT

## 2025-01-31 PROCEDURE — 2580000003 HC RX 258

## 2025-01-31 PROCEDURE — 85025 COMPLETE CBC W/AUTO DIFF WBC: CPT

## 2025-01-31 PROCEDURE — 85730 THROMBOPLASTIN TIME PARTIAL: CPT

## 2025-01-31 PROCEDURE — 81001 URINALYSIS AUTO W/SCOPE: CPT

## 2025-01-31 PROCEDURE — 85027 COMPLETE CBC AUTOMATED: CPT

## 2025-01-31 PROCEDURE — 84484 ASSAY OF TROPONIN QUANT: CPT

## 2025-01-31 PROCEDURE — 71045 X-RAY EXAM CHEST 1 VIEW: CPT

## 2025-01-31 PROCEDURE — 2580000003 HC RX 258: Performed by: STUDENT IN AN ORGANIZED HEALTH CARE EDUCATION/TRAINING PROGRAM

## 2025-01-31 PROCEDURE — 99285 EMERGENCY DEPT VISIT HI MDM: CPT

## 2025-01-31 PROCEDURE — 6360000004 HC RX CONTRAST MEDICATION: Performed by: FAMILY MEDICINE

## 2025-01-31 PROCEDURE — 93005 ELECTROCARDIOGRAM TRACING: CPT | Performed by: STUDENT IN AN ORGANIZED HEALTH CARE EDUCATION/TRAINING PROGRAM

## 2025-01-31 PROCEDURE — 93005 ELECTROCARDIOGRAM TRACING: CPT

## 2025-01-31 PROCEDURE — 71275 CT ANGIOGRAPHY CHEST: CPT

## 2025-01-31 PROCEDURE — 2500000003 HC RX 250 WO HCPCS

## 2025-01-31 RX ORDER — 0.9 % SODIUM CHLORIDE 0.9 %
500 INTRAVENOUS SOLUTION INTRAVENOUS ONCE
Status: COMPLETED | OUTPATIENT
Start: 2025-01-31 | End: 2025-01-31

## 2025-01-31 RX ORDER — IOPAMIDOL 755 MG/ML
100 INJECTION, SOLUTION INTRAVASCULAR
Status: CANCELLED | OUTPATIENT
Start: 2025-01-31

## 2025-01-31 RX ORDER — SODIUM CHLORIDE, SODIUM LACTATE, POTASSIUM CHLORIDE, AND CALCIUM CHLORIDE .6; .31; .03; .02 G/100ML; G/100ML; G/100ML; G/100ML
1000 INJECTION, SOLUTION INTRAVENOUS ONCE
Status: COMPLETED | OUTPATIENT
Start: 2025-01-31 | End: 2025-01-31

## 2025-01-31 RX ORDER — HEPARIN SODIUM 1000 [USP'U]/ML
4000 INJECTION, SOLUTION INTRAVENOUS; SUBCUTANEOUS ONCE
Status: COMPLETED | OUTPATIENT
Start: 2025-01-31 | End: 2025-01-31

## 2025-01-31 RX ORDER — SODIUM CHLORIDE 9 MG/ML
INJECTION, SOLUTION INTRAVENOUS PRN
Status: DISCONTINUED | OUTPATIENT
Start: 2025-01-31 | End: 2025-02-04 | Stop reason: HOSPADM

## 2025-01-31 RX ORDER — ATORVASTATIN CALCIUM 20 MG/1
20 TABLET, FILM COATED ORAL
Status: DISCONTINUED | OUTPATIENT
Start: 2025-01-31 | End: 2025-02-04 | Stop reason: HOSPADM

## 2025-01-31 RX ORDER — HEPARIN SODIUM 1000 [USP'U]/ML
4000 INJECTION, SOLUTION INTRAVENOUS; SUBCUTANEOUS PRN
Status: DISCONTINUED | OUTPATIENT
Start: 2025-01-31 | End: 2025-02-04 | Stop reason: HOSPADM

## 2025-01-31 RX ORDER — ONDANSETRON 2 MG/ML
4 INJECTION INTRAMUSCULAR; INTRAVENOUS EVERY 6 HOURS PRN
Status: DISCONTINUED | OUTPATIENT
Start: 2025-01-31 | End: 2025-02-04 | Stop reason: HOSPADM

## 2025-01-31 RX ORDER — SODIUM CHLORIDE 0.9 % (FLUSH) 0.9 %
5-40 SYRINGE (ML) INJECTION EVERY 12 HOURS SCHEDULED
Status: DISCONTINUED | OUTPATIENT
Start: 2025-01-31 | End: 2025-02-04 | Stop reason: HOSPADM

## 2025-01-31 RX ORDER — HEPARIN SODIUM 1000 [USP'U]/ML
2000 INJECTION, SOLUTION INTRAVENOUS; SUBCUTANEOUS PRN
Status: DISCONTINUED | OUTPATIENT
Start: 2025-01-31 | End: 2025-02-04 | Stop reason: HOSPADM

## 2025-01-31 RX ORDER — ACETAMINOPHEN 325 MG/1
650 TABLET ORAL EVERY 6 HOURS PRN
Status: DISCONTINUED | OUTPATIENT
Start: 2025-01-31 | End: 2025-02-04 | Stop reason: HOSPADM

## 2025-01-31 RX ORDER — METRONIDAZOLE 500 MG/100ML
500 INJECTION, SOLUTION INTRAVENOUS EVERY 8 HOURS
Status: DISCONTINUED | OUTPATIENT
Start: 2025-01-31 | End: 2025-02-02

## 2025-01-31 RX ORDER — METOPROLOL TARTRATE 50 MG
50 TABLET ORAL 2 TIMES DAILY
Status: DISCONTINUED | OUTPATIENT
Start: 2025-01-31 | End: 2025-02-04 | Stop reason: HOSPADM

## 2025-01-31 RX ORDER — IOPAMIDOL 755 MG/ML
100 INJECTION, SOLUTION INTRAVASCULAR
Status: COMPLETED | OUTPATIENT
Start: 2025-01-31 | End: 2025-01-31

## 2025-01-31 RX ORDER — POLYETHYLENE GLYCOL 3350 17 G/17G
17 POWDER, FOR SOLUTION ORAL DAILY PRN
Status: DISCONTINUED | OUTPATIENT
Start: 2025-01-31 | End: 2025-02-04 | Stop reason: HOSPADM

## 2025-01-31 RX ORDER — VALSARTAN 80 MG/1
80 TABLET ORAL DAILY
Status: DISCONTINUED | OUTPATIENT
Start: 2025-01-31 | End: 2025-02-04 | Stop reason: HOSPADM

## 2025-01-31 RX ORDER — AMLODIPINE BESYLATE 5 MG/1
10 TABLET ORAL NIGHTLY
Status: DISCONTINUED | OUTPATIENT
Start: 2025-01-31 | End: 2025-02-04 | Stop reason: HOSPADM

## 2025-01-31 RX ORDER — POTASSIUM CHLORIDE 750 MG/1
20 TABLET, EXTENDED RELEASE ORAL ONCE
Status: COMPLETED | OUTPATIENT
Start: 2025-01-31 | End: 2025-01-31

## 2025-01-31 RX ORDER — HEPARIN SODIUM 10000 [USP'U]/100ML
5-30 INJECTION, SOLUTION INTRAVENOUS CONTINUOUS
Status: DISCONTINUED | OUTPATIENT
Start: 2025-01-31 | End: 2025-02-04 | Stop reason: HOSPADM

## 2025-01-31 RX ORDER — ONDANSETRON 4 MG/1
4 TABLET, ORALLY DISINTEGRATING ORAL EVERY 8 HOURS PRN
Status: DISCONTINUED | OUTPATIENT
Start: 2025-01-31 | End: 2025-02-04 | Stop reason: HOSPADM

## 2025-01-31 RX ORDER — SODIUM CHLORIDE 0.9 % (FLUSH) 0.9 %
5-40 SYRINGE (ML) INJECTION PRN
Status: DISCONTINUED | OUTPATIENT
Start: 2025-01-31 | End: 2025-02-04 | Stop reason: HOSPADM

## 2025-01-31 RX ORDER — 0.9 % SODIUM CHLORIDE 0.9 %
1000 INTRAVENOUS SOLUTION INTRAVENOUS ONCE
Status: DISCONTINUED | OUTPATIENT
Start: 2025-01-31 | End: 2025-01-31

## 2025-01-31 RX ORDER — ACETAMINOPHEN 650 MG/1
650 SUPPOSITORY RECTAL EVERY 6 HOURS PRN
Status: DISCONTINUED | OUTPATIENT
Start: 2025-01-31 | End: 2025-02-04 | Stop reason: HOSPADM

## 2025-01-31 RX ADMIN — HEPARIN SODIUM 4000 UNITS: 1000 INJECTION INTRAVENOUS; SUBCUTANEOUS at 18:55

## 2025-01-31 RX ADMIN — SODIUM CHLORIDE, POTASSIUM CHLORIDE, SODIUM LACTATE AND CALCIUM CHLORIDE 1000 ML: 600; 310; 30; 20 INJECTION, SOLUTION INTRAVENOUS at 19:35

## 2025-01-31 RX ADMIN — PIPERACILLIN AND TAZOBACTAM 4500 MG: 4; .5 INJECTION, POWDER, LYOPHILIZED, FOR SOLUTION INTRAVENOUS at 15:14

## 2025-01-31 RX ADMIN — HEPARIN SODIUM 11 UNITS/KG/HR: 10000 INJECTION, SOLUTION INTRAVENOUS at 19:13

## 2025-01-31 RX ADMIN — METRONIDAZOLE 500 MG: 500 INJECTION, SOLUTION INTRAVENOUS at 20:51

## 2025-01-31 RX ADMIN — POTASSIUM CHLORIDE 20 MEQ: 750 TABLET, EXTENDED RELEASE ORAL at 20:42

## 2025-01-31 RX ADMIN — IOPAMIDOL 100 ML: 755 INJECTION, SOLUTION INTRAVENOUS at 15:56

## 2025-01-31 RX ADMIN — METOPROLOL TARTRATE 50 MG: 50 TABLET, FILM COATED ORAL at 19:31

## 2025-01-31 RX ADMIN — SODIUM CHLORIDE 500 ML: 9 INJECTION, SOLUTION INTRAVENOUS at 15:14

## 2025-01-31 RX ADMIN — SODIUM CHLORIDE, PRESERVATIVE FREE 10 ML: 5 INJECTION INTRAVENOUS at 20:42

## 2025-01-31 RX ADMIN — WATER 1000 MG: 1 INJECTION INTRAMUSCULAR; INTRAVENOUS; SUBCUTANEOUS at 21:03

## 2025-01-31 RX ADMIN — ATORVASTATIN CALCIUM 20 MG: 20 TABLET, FILM COATED ORAL at 20:42

## 2025-01-31 RX ADMIN — AZITHROMYCIN MONOHYDRATE 500 MG: 500 INJECTION, POWDER, LYOPHILIZED, FOR SOLUTION INTRAVENOUS at 20:41

## 2025-01-31 ASSESSMENT — PAIN - FUNCTIONAL ASSESSMENT: PAIN_FUNCTIONAL_ASSESSMENT: NONE - DENIES PAIN

## 2025-01-31 NOTE — ED NOTES
TRANSFER - OUT REPORT:    Verbal report given to Roxy TORRES/FADI on Jose Mc  being transferred to [place] 3rd Floor for routine progression of patient care       Report consisted of patient's Situation, Background, Assessment and   Recommendations(SBAR).     Information from the following report(s) ED Encounter Summary was reviewed with the receiving nurse.    [place] Fall Assessment:                           Lines:   Peripheral IV 01/31/25 Right Antecubital (Active)       Peripheral IV 01/31/25 Left Antecubital (Active)        Opportunity for questions and clarification was provided.      Patient transported with:  Monitor, O2 at 4l/m via NC, 1000 mg Vancomycin  @ 100cc/hr, 1250 mg Vancomycin @ 100 cc/h

## 2025-01-31 NOTE — ED PROVIDER NOTES
[place] EMERGENCY DEPARTMENT  EMERGENCY DEPARTMENT ENCOUNTER      Pt Name: Jose Mc  MRN: [id number]  Birthdate 1955  Date of evaluation: 1/31/2025  Provider: Lupillo Salgado MD    CHIEF COMPLAINT       Chief Complaint   Patient presents with    Shortness of Breath       HISTORY OF PRESENT ILLNESS    HPI    Nursing notes reviewed.    REVIEW OF SYSTEMS     Review of Systems  Unless otherwise stated, a complete review of systems was asked of the patient. Pertinent positives are noted in the HPI section.    PAST MEDICAL HISTORY     Past Medical History:   Diagnosis Date    Cancer (HCC) 07/2022    prostate, radiation    Depression     Hypertension     Stroke (HCC)     tia behind his eye branch retina artery       SURGICAL HISTORY       Past Surgical History:   Procedure Laterality Date    CATARACT EXTRACTION, BILATERAL  2022    COLONOSCOPY N/A 2/14/2023    COLONOSCOPY performed by John Shaffer MD at [facility] ENDOSCOPY       CURRENT MEDICATIONS       Previous Medications    AMLODIPINE (NORVASC) 10 MG TABLET    Take 1 tablet by mouth nightly    ASPIRIN 81 MG CAPS    Take 81 mg by mouth daily    CHOLECALCIFEROL (VITAMIN D3) 50 MCG (2000 UT) CAPS    Take by mouth    METOPROLOL TARTRATE (LOPRESSOR) 50 MG TABLET    Take 1 tablet by mouth 2 times daily    SIMVASTATIN (ZOCOR) 40 MG TABLET    Take 40 mg by mouth nightly    VALSARTAN (DIOVAN) 80 MG TABLET    Take 1 tablet by mouth daily       ALLERGIES     Patient has no known allergies.    FAMILY HISTORY       Family History   Problem Relation Age of Onset    Diabetes Mother     Colon Cancer Father     Heart Failure Father     Diabetes Brother     Diabetes Father     Heart Disease Father     Hypertension Brother     Heart Attack Brother     Cancer Father         rectal        SOCIAL HISTORY       Social History     Socioeconomic History    Marital status:    Tobacco Use    Smoking status: Never    Smokeless tobacco: Never   Substance and Sexual Activity

## 2025-01-31 NOTE — ED TRIAGE NOTES
Patient presents via EMS covers in old feces c/o shortness of breath, denies pain reports living alone and has not kip able to get out od bed.

## 2025-02-01 ENCOUNTER — APPOINTMENT (OUTPATIENT)
Dept: VASCULAR SURGERY | Facility: HOSPITAL | Age: 70
DRG: 871 | End: 2025-02-01
Payer: MEDICARE

## 2025-02-01 ENCOUNTER — APPOINTMENT (OUTPATIENT)
Facility: HOSPITAL | Age: 70
DRG: 871 | End: 2025-02-01
Payer: MEDICARE

## 2025-02-01 PROBLEM — J98.19 COLLAPSE OF LUNG: Status: ACTIVE | Noted: 2025-02-01

## 2025-02-01 PROBLEM — J86.9 EMPYEMA (HCC): Status: ACTIVE | Noted: 2025-02-01

## 2025-02-01 PROBLEM — J96.01 ACUTE HYPOXIC RESPIRATORY FAILURE: Status: ACTIVE | Noted: 2025-02-01

## 2025-02-01 PROBLEM — J90 PLEURAL EFFUSION: Status: ACTIVE | Noted: 2025-02-01

## 2025-02-01 PROBLEM — I27.82 CHRONIC PULMONARY EMBOLISM (HCC): Status: ACTIVE | Noted: 2025-02-01

## 2025-02-01 LAB
ALBUMIN SERPL-MCNC: 1.3 G/DL (ref 3.5–5)
ALBUMIN SERPL-MCNC: 1.3 G/DL (ref 3.5–5)
ALBUMIN SERPL-MCNC: 1.5 G/DL (ref 3.5–5)
ALBUMIN/GLOB SERPL: 0.3 (ref 1.1–2.2)
ALP SERPL-CCNC: 67 U/L (ref 45–117)
ALP SERPL-CCNC: 67 U/L (ref 45–117)
ALP SERPL-CCNC: 70 U/L (ref 45–117)
ALT SERPL-CCNC: 10 U/L (ref 12–78)
ALT SERPL-CCNC: 9 U/L (ref 12–78)
ALT SERPL-CCNC: 9 U/L (ref 12–78)
ANION GAP SERPL CALC-SCNC: 7 MMOL/L (ref 2–12)
ANION GAP SERPL CALC-SCNC: 7 MMOL/L (ref 2–12)
ANION GAP SERPL CALC-SCNC: 8 MMOL/L (ref 2–12)
APPEARANCE FLD: ABNORMAL
AST SERPL-CCNC: 10 U/L (ref 15–37)
AST SERPL-CCNC: 10 U/L (ref 15–37)
AST SERPL-CCNC: 9 U/L (ref 15–37)
BASOPHILS # BLD: 0.07 K/UL (ref 0–0.1)
BASOPHILS # BLD: 0.07 K/UL (ref 0–0.1)
BASOPHILS NFR BLD: 0.6 % (ref 0–1)
BASOPHILS NFR BLD: 0.6 % (ref 0–1)
BILIRUB SERPL-MCNC: 0.4 MG/DL (ref 0.2–1)
BILIRUB SERPL-MCNC: 0.5 MG/DL (ref 0.2–1)
BILIRUB SERPL-MCNC: 0.5 MG/DL (ref 0.2–1)
BODY FLD TYPE: NORMAL
BUN SERPL-MCNC: 10 MG/DL (ref 6–20)
BUN SERPL-MCNC: 10 MG/DL (ref 6–20)
BUN SERPL-MCNC: 11 MG/DL (ref 6–20)
BUN/CREAT SERPL: 14 (ref 12–20)
BUN/CREAT SERPL: 17 (ref 12–20)
BUN/CREAT SERPL: 17 (ref 12–20)
CALCIUM SERPL-MCNC: 8.1 MG/DL (ref 8.5–10.1)
CALCIUM SERPL-MCNC: 8.2 MG/DL (ref 8.5–10.1)
CALCIUM SERPL-MCNC: 8.3 MG/DL (ref 8.5–10.1)
CHLORIDE SERPL-SCNC: 110 MMOL/L (ref 97–108)
CHLORIDE SERPL-SCNC: 111 MMOL/L (ref 97–108)
CHLORIDE SERPL-SCNC: 112 MMOL/L (ref 97–108)
CO2 SERPL-SCNC: 22 MMOL/L (ref 21–32)
CO2 SERPL-SCNC: 22 MMOL/L (ref 21–32)
CO2 SERPL-SCNC: 23 MMOL/L (ref 21–32)
COLOR FLD: YELLOW
CREAT SERPL-MCNC: 0.59 MG/DL (ref 0.7–1.3)
CREAT SERPL-MCNC: 0.66 MG/DL (ref 0.7–1.3)
CREAT SERPL-MCNC: 0.69 MG/DL (ref 0.7–1.3)
DIFFERENTIAL METHOD BLD: ABNORMAL
DIFFERENTIAL METHOD BLD: ABNORMAL
ECHO AO ARCH DIAM: 2.7 CM
ECHO AO ASC DIAM: 3.8 CM
ECHO AO ASCENDING AORTA INDEX: 1.8 CM/M2
ECHO AO ROOT DIAM: 3.5 CM
ECHO AO ROOT INDEX: 1.66 CM/M2
ECHO AV AREA PEAK VELOCITY: 0.9 CM2
ECHO AV AREA VTI: 1 CM2
ECHO AV AREA/BSA PEAK VELOCITY: 0.4 CM2/M2
ECHO AV AREA/BSA VTI: 0.5 CM2/M2
ECHO AV MEAN GRADIENT: 13 MMHG
ECHO AV MEAN VELOCITY: 1.7 M/S
ECHO AV PEAK GRADIENT: 22 MMHG
ECHO AV PEAK VELOCITY: 2.4 M/S
ECHO AV VELOCITY RATIO: 0.25
ECHO AV VTI: 45.8 CM
ECHO BSA: 2.13 M2
ECHO EST RA PRESSURE: 8 MMHG
ECHO LA DIAMETER INDEX: 1.28 CM/M2
ECHO LA DIAMETER: 2.7 CM
ECHO LA TO AORTIC ROOT RATIO: 0.77
ECHO LA VOL A-L A2C: 34 ML (ref 18–58)
ECHO LA VOL A-L A4C: 23 ML (ref 18–58)
ECHO LA VOL BP: 21 ML (ref 18–58)
ECHO LA VOL MOD A2C: 21 ML (ref 18–58)
ECHO LA VOL MOD A4C: 20 ML (ref 18–58)
ECHO LA VOL/BSA BIPLANE: 10 ML/M2 (ref 16–34)
ECHO LA VOLUME AREA LENGTH: 28 ML
ECHO LA VOLUME INDEX A-L A2C: 16 ML/M2 (ref 16–34)
ECHO LA VOLUME INDEX A-L A4C: 11 ML/M2 (ref 16–34)
ECHO LA VOLUME INDEX AREA LENGTH: 13 ML/M2 (ref 16–34)
ECHO LA VOLUME INDEX MOD A2C: 10 ML/M2 (ref 16–34)
ECHO LA VOLUME INDEX MOD A4C: 9 ML/M2 (ref 16–34)
ECHO LV E' LATERAL VELOCITY: 9.29 CM/S
ECHO LV E' SEPTAL VELOCITY: 5.43 CM/S
ECHO LV EF PHYSICIAN: 60 %
ECHO LV FRACTIONAL SHORTENING: 35 % (ref 28–44)
ECHO LV INTERNAL DIMENSION DIASTOLE INDEX: 2.46 CM/M2
ECHO LV INTERNAL DIMENSION DIASTOLIC: 5.2 CM (ref 4.2–5.9)
ECHO LV INTERNAL DIMENSION SYSTOLIC INDEX: 1.61 CM/M2
ECHO LV INTERNAL DIMENSION SYSTOLIC: 3.4 CM
ECHO LV IVSD: 0.7 CM (ref 0.6–1)
ECHO LV MASS 2D: 122.8 G (ref 88–224)
ECHO LV MASS INDEX 2D: 58.2 G/M2 (ref 49–115)
ECHO LV POSTERIOR WALL DIASTOLIC: 0.7 CM (ref 0.6–1)
ECHO LV RELATIVE WALL THICKNESS RATIO: 0.27
ECHO LVOT AREA: 3.5 CM2
ECHO LVOT AV VTI INDEX: 0.29
ECHO LVOT DIAM: 2.1 CM
ECHO LVOT MEAN GRADIENT: 1 MMHG
ECHO LVOT PEAK GRADIENT: 1 MMHG
ECHO LVOT PEAK VELOCITY: 0.6 M/S
ECHO LVOT STROKE VOLUME INDEX: 21.8 ML/M2
ECHO LVOT SV: 46 ML
ECHO LVOT VTI: 13.3 CM
ECHO MV A VELOCITY: 1.04 M/S
ECHO MV E DECELERATION TIME (DT): 208.2 MS
ECHO MV E VELOCITY: 0.93 M/S
ECHO MV E/A RATIO: 0.89
ECHO MV E/E' LATERAL: 10.01
ECHO MV E/E' RATIO (AVERAGED): 13.57
ECHO MV E/E' SEPTAL: 17.13
ECHO PV MAX VELOCITY: 0.8 M/S
ECHO PV PEAK GRADIENT: 3 MMHG
ECHO RIGHT VENTRICULAR SYSTOLIC PRESSURE (RVSP): 28 MMHG
ECHO RV FREE WALL PEAK S': 13.4 CM/S
ECHO RV INTERNAL DIMENSION: 3.5 CM
ECHO RV TAPSE: 2.3 CM (ref 1.7–?)
ECHO TV REGURGITANT MAX VELOCITY: 2.23 M/S
ECHO TV REGURGITANT PEAK GRADIENT: 20 MMHG
EKG ATRIAL RATE: 105 BPM
EKG DIAGNOSIS: NORMAL
EKG P AXIS: 51 DEGREES
EKG P-R INTERVAL: 162 MS
EKG Q-T INTERVAL: 388 MS
EKG QRS DURATION: 138 MS
EKG QTC CALCULATION (BAZETT): 512 MS
EKG R AXIS: 0 DEGREES
EKG T AXIS: 38 DEGREES
EKG VENTRICULAR RATE: 105 BPM
EOSINOPHIL # BLD: 0.04 K/UL (ref 0–0.4)
EOSINOPHIL # BLD: 0.05 K/UL (ref 0–0.4)
EOSINOPHIL NFR BLD: 0.3 % (ref 0–7)
EOSINOPHIL NFR BLD: 0.4 % (ref 0–7)
ERYTHROCYTE [DISTWIDTH] IN BLOOD BY AUTOMATED COUNT: 16.6 % (ref 11.5–14.5)
ERYTHROCYTE [DISTWIDTH] IN BLOOD BY AUTOMATED COUNT: 16.8 % (ref 11.5–14.5)
FLUAV RNA SPEC QL NAA+PROBE: NOT DETECTED
FLUBV RNA SPEC QL NAA+PROBE: NOT DETECTED
GLOBULIN SER CALC-MCNC: 4.5 G/DL (ref 2–4)
GLOBULIN SER CALC-MCNC: 4.7 G/DL (ref 2–4)
GLOBULIN SER CALC-MCNC: 4.7 G/DL (ref 2–4)
GLUCOSE SERPL-MCNC: 103 MG/DL (ref 65–100)
GLUCOSE SERPL-MCNC: 118 MG/DL (ref 65–100)
GLUCOSE SERPL-MCNC: 87 MG/DL (ref 65–100)
HCT VFR BLD AUTO: 21.9 % (ref 36.6–50.3)
HCT VFR BLD AUTO: 22.6 % (ref 36.6–50.3)
HCT VFR BLD AUTO: 22.7 % (ref 36.6–50.3)
HCT VFR BLD AUTO: 23.4 % (ref 36.6–50.3)
HGB BLD-MCNC: 6.7 G/DL (ref 12.1–17)
HGB BLD-MCNC: 6.7 G/DL (ref 12.1–17)
HGB BLD-MCNC: 6.8 G/DL (ref 12.1–17)
HGB BLD-MCNC: 7 G/DL (ref 12.1–17)
HISTORY CHECK: NORMAL
IMM GRANULOCYTES # BLD AUTO: 0.11 K/UL (ref 0–0.04)
IMM GRANULOCYTES # BLD AUTO: 0.12 K/UL (ref 0–0.04)
IMM GRANULOCYTES NFR BLD AUTO: 1 % (ref 0–0.5)
IMM GRANULOCYTES NFR BLD AUTO: 1 % (ref 0–0.5)
LDH SERPL L TO P-CCNC: 155 U/L (ref 85–241)
LYMPHOCYTES # BLD: 1.21 K/UL (ref 0.8–3.5)
LYMPHOCYTES # BLD: 1.25 K/UL (ref 0.8–3.5)
LYMPHOCYTES NFR BLD: 10.1 % (ref 12–49)
LYMPHOCYTES NFR BLD: 10.8 % (ref 12–49)
MAGNESIUM SERPL-MCNC: 1.8 MG/DL (ref 1.6–2.4)
MCH RBC QN AUTO: 26.1 PG (ref 26–34)
MCH RBC QN AUTO: 26.7 PG (ref 26–34)
MCHC RBC AUTO-ENTMCNC: 29.9 G/DL (ref 30–36.5)
MCHC RBC AUTO-ENTMCNC: 30.6 G/DL (ref 30–36.5)
MCV RBC AUTO: 87.3 FL (ref 80–99)
MCV RBC AUTO: 87.3 FL (ref 80–99)
MONOCYTES # BLD: 0.86 K/UL (ref 0–1)
MONOCYTES # BLD: 0.92 K/UL (ref 0–1)
MONOCYTES NFR BLD: 7.4 % (ref 5–13)
MONOCYTES NFR BLD: 7.6 % (ref 5–13)
NEUTS SEG # BLD: 10 K/UL (ref 1.8–8)
NEUTS SEG # BLD: 8.95 K/UL (ref 1.8–8)
NEUTS SEG NFR BLD: 79.6 % (ref 32–75)
NEUTS SEG NFR BLD: 80.6 % (ref 32–75)
NRBC # BLD: 0 K/UL (ref 0–0.01)
NRBC # BLD: 0 K/UL (ref 0–0.01)
NRBC BLD-RTO: 0 PER 100 WBC
NRBC BLD-RTO: 0 PER 100 WBC
NUC CELL # FLD: ABNORMAL /CU MM
PH FLD: 6.4
PLATELET # BLD AUTO: 313 K/UL (ref 150–400)
PLATELET # BLD AUTO: 327 K/UL (ref 150–400)
PMV BLD AUTO: 10 FL (ref 8.9–12.9)
PMV BLD AUTO: 9.8 FL (ref 8.9–12.9)
POTASSIUM SERPL-SCNC: 3.3 MMOL/L (ref 3.5–5.1)
POTASSIUM SERPL-SCNC: 3.7 MMOL/L (ref 3.5–5.1)
POTASSIUM SERPL-SCNC: 3.9 MMOL/L (ref 3.5–5.1)
PROT SERPL-MCNC: 6 G/DL (ref 6.4–8.2)
RBC # BLD AUTO: 2.51 M/UL (ref 4.1–5.7)
RBC # BLD AUTO: 2.68 M/UL (ref 4.1–5.7)
RBC # FLD: >100 /CU MM
RBC MORPH BLD: ABNORMAL
SARS-COV-2 RNA RESP QL NAA+PROBE: NOT DETECTED
SODIUM SERPL-SCNC: 140 MMOL/L (ref 136–145)
SODIUM SERPL-SCNC: 141 MMOL/L (ref 136–145)
SODIUM SERPL-SCNC: 141 MMOL/L (ref 136–145)
SOURCE: NORMAL
SPECIMEN SOURCE FLD: ABNORMAL
TOTAL CELLS COUNTED SPEC: 0
UFH PPP CHRO-ACNC: 0.14 IU/ML
UFH PPP CHRO-ACNC: <0.1 IU/ML
WBC # BLD AUTO: 11.3 K/UL (ref 4.1–11.1)
WBC # BLD AUTO: 12.4 K/UL (ref 4.1–11.1)

## 2025-02-01 PROCEDURE — 93306 TTE W/DOPPLER COMPLETE: CPT

## 2025-02-01 PROCEDURE — 86923 COMPATIBILITY TEST ELECTRIC: CPT

## 2025-02-01 PROCEDURE — 6360000002 HC RX W HCPCS

## 2025-02-01 PROCEDURE — 85025 COMPLETE CBC W/AUTO DIFF WBC: CPT

## 2025-02-01 PROCEDURE — 80053 COMPREHEN METABOLIC PANEL: CPT

## 2025-02-01 PROCEDURE — 86850 RBC ANTIBODY SCREEN: CPT

## 2025-02-01 PROCEDURE — 83615 LACTATE (LD) (LDH) ENZYME: CPT

## 2025-02-01 PROCEDURE — 94761 N-INVAS EAR/PLS OXIMETRY MLT: CPT

## 2025-02-01 PROCEDURE — 88112 CYTOPATH CELL ENHANCE TECH: CPT

## 2025-02-01 PROCEDURE — 30233N1 TRANSFUSION OF NONAUTOLOGOUS RED BLOOD CELLS INTO PERIPHERAL VEIN, PERCUTANEOUS APPROACH: ICD-10-PCS | Performed by: FAMILY MEDICINE

## 2025-02-01 PROCEDURE — P9045 ALBUMIN (HUMAN), 5%, 250 ML: HCPCS

## 2025-02-01 PROCEDURE — 86901 BLOOD TYPING SEROLOGIC RH(D): CPT

## 2025-02-01 PROCEDURE — 87102 FUNGUS ISOLATION CULTURE: CPT

## 2025-02-01 PROCEDURE — 87205 SMEAR GRAM STAIN: CPT

## 2025-02-01 PROCEDURE — 85018 HEMOGLOBIN: CPT

## 2025-02-01 PROCEDURE — 85520 HEPARIN ASSAY: CPT

## 2025-02-01 PROCEDURE — 0W9930Z DRAINAGE OF RIGHT PLEURAL CAVITY WITH DRAINAGE DEVICE, PERCUTANEOUS APPROACH: ICD-10-PCS | Performed by: STUDENT IN AN ORGANIZED HEALTH CARE EDUCATION/TRAINING PROGRAM

## 2025-02-01 PROCEDURE — 93306 TTE W/DOPPLER COMPLETE: CPT | Performed by: SPECIALIST

## 2025-02-01 PROCEDURE — 6360000002 HC RX W HCPCS: Performed by: STUDENT IN AN ORGANIZED HEALTH CARE EDUCATION/TRAINING PROGRAM

## 2025-02-01 PROCEDURE — C1729 CATH, DRAINAGE: HCPCS

## 2025-02-01 PROCEDURE — 83735 ASSAY OF MAGNESIUM: CPT

## 2025-02-01 PROCEDURE — 2580000003 HC RX 258

## 2025-02-01 PROCEDURE — 87077 CULTURE AEROBIC IDENTIFY: CPT

## 2025-02-01 PROCEDURE — 93970 EXTREMITY STUDY: CPT

## 2025-02-01 PROCEDURE — 89050 BODY FLUID CELL COUNT: CPT

## 2025-02-01 PROCEDURE — 99236 HOSP IP/OBS SAME DATE HI 85: CPT | Performed by: FAMILY MEDICINE

## 2025-02-01 PROCEDURE — P9016 RBC LEUKOCYTES REDUCED: HCPCS

## 2025-02-01 PROCEDURE — 93010 ELECTROCARDIOGRAM REPORT: CPT | Performed by: SPECIALIST

## 2025-02-01 PROCEDURE — 85014 HEMATOCRIT: CPT

## 2025-02-01 PROCEDURE — 84157 ASSAY OF PROTEIN OTHER: CPT

## 2025-02-01 PROCEDURE — 6370000000 HC RX 637 (ALT 250 FOR IP)

## 2025-02-01 PROCEDURE — 87636 SARSCOV2 & INF A&B AMP PRB: CPT

## 2025-02-01 PROCEDURE — 86900 BLOOD TYPING SEROLOGIC ABO: CPT

## 2025-02-01 PROCEDURE — 5A0945A ASSISTANCE WITH RESPIRATORY VENTILATION, 24-96 CONSECUTIVE HOURS, HIGH NASAL FLOW/VELOCITY: ICD-10-PCS | Performed by: FAMILY MEDICINE

## 2025-02-01 PROCEDURE — 71045 X-RAY EXAM CHEST 1 VIEW: CPT

## 2025-02-01 PROCEDURE — 87070 CULTURE OTHR SPECIMN AEROBIC: CPT

## 2025-02-01 PROCEDURE — 36430 TRANSFUSION BLD/BLD COMPNT: CPT

## 2025-02-01 PROCEDURE — 87186 SC STD MICRODIL/AGAR DIL: CPT

## 2025-02-01 PROCEDURE — 83986 ASSAY PH BODY FLUID NOS: CPT

## 2025-02-01 PROCEDURE — 2700000000 HC OXYGEN THERAPY PER DAY

## 2025-02-01 PROCEDURE — 36415 COLL VENOUS BLD VENIPUNCTURE: CPT

## 2025-02-01 PROCEDURE — 2060000000 HC ICU INTERMEDIATE R&B

## 2025-02-01 PROCEDURE — 2500000003 HC RX 250 WO HCPCS

## 2025-02-01 PROCEDURE — 82945 GLUCOSE OTHER FLUID: CPT

## 2025-02-01 RX ORDER — GUAIFENESIN 600 MG/1
600 TABLET, EXTENDED RELEASE ORAL 2 TIMES DAILY
Status: DISCONTINUED | OUTPATIENT
Start: 2025-02-01 | End: 2025-02-04 | Stop reason: HOSPADM

## 2025-02-01 RX ORDER — LIDOCAINE HYDROCHLORIDE 10 MG/ML
10 INJECTION, SOLUTION EPIDURAL; INFILTRATION; INTRACAUDAL; PERINEURAL ONCE
Status: COMPLETED | OUTPATIENT
Start: 2025-02-01 | End: 2025-02-01

## 2025-02-01 RX ORDER — SODIUM CHLORIDE 9 MG/ML
INJECTION, SOLUTION INTRAVENOUS PRN
Status: DISCONTINUED | OUTPATIENT
Start: 2025-02-01 | End: 2025-02-04 | Stop reason: HOSPADM

## 2025-02-01 RX ORDER — POTASSIUM CHLORIDE 750 MG/1
40 TABLET, EXTENDED RELEASE ORAL ONCE
Status: COMPLETED | OUTPATIENT
Start: 2025-02-01 | End: 2025-02-01

## 2025-02-01 RX ORDER — LIDOCAINE HYDROCHLORIDE 10 MG/ML
5 INJECTION, SOLUTION EPIDURAL; INFILTRATION; INTRACAUDAL; PERINEURAL ONCE
Status: COMPLETED | OUTPATIENT
Start: 2025-02-01 | End: 2025-02-01

## 2025-02-01 RX ORDER — BENZONATATE 100 MG/1
100 CAPSULE ORAL 3 TIMES DAILY PRN
Status: DISCONTINUED | OUTPATIENT
Start: 2025-02-01 | End: 2025-02-04 | Stop reason: HOSPADM

## 2025-02-01 RX ORDER — SODIUM CHLORIDE, SODIUM LACTATE, POTASSIUM CHLORIDE, AND CALCIUM CHLORIDE .6; .31; .03; .02 G/100ML; G/100ML; G/100ML; G/100ML
1000 INJECTION, SOLUTION INTRAVENOUS ONCE
Status: COMPLETED | OUTPATIENT
Start: 2025-02-01 | End: 2025-02-01

## 2025-02-01 RX ORDER — ALBUMIN HUMAN 50 G/1000ML
12.5 SOLUTION INTRAVENOUS ONCE
Status: COMPLETED | OUTPATIENT
Start: 2025-02-01 | End: 2025-02-01

## 2025-02-01 RX ADMIN — METRONIDAZOLE 500 MG: 500 INJECTION, SOLUTION INTRAVENOUS at 20:25

## 2025-02-01 RX ADMIN — Medication 3 MG: at 01:29

## 2025-02-01 RX ADMIN — ATORVASTATIN CALCIUM 20 MG: 20 TABLET, FILM COATED ORAL at 20:20

## 2025-02-01 RX ADMIN — WATER 1000 MG: 1 INJECTION INTRAMUSCULAR; INTRAVENOUS; SUBCUTANEOUS at 21:04

## 2025-02-01 RX ADMIN — METOPROLOL TARTRATE 50 MG: 50 TABLET, FILM COATED ORAL at 20:20

## 2025-02-01 RX ADMIN — METOPROLOL TARTRATE 50 MG: 50 TABLET, FILM COATED ORAL at 08:49

## 2025-02-01 RX ADMIN — LIDOCAINE HYDROCHLORIDE ANHYDROUS 5 ML: 10 INJECTION, SOLUTION INFILTRATION at 12:59

## 2025-02-01 RX ADMIN — AZITHROMYCIN MONOHYDRATE 500 MG: 500 INJECTION, POWDER, LYOPHILIZED, FOR SOLUTION INTRAVENOUS at 20:28

## 2025-02-01 RX ADMIN — BENZONATATE 100 MG: 100 CAPSULE ORAL at 21:04

## 2025-02-01 RX ADMIN — GUAIFENESIN 600 MG: 600 TABLET ORAL at 20:20

## 2025-02-01 RX ADMIN — HEPARIN SODIUM 4000 UNITS: 1000 INJECTION INTRAVENOUS; SUBCUTANEOUS at 01:33

## 2025-02-01 RX ADMIN — GUAIFENESIN 600 MG: 600 TABLET ORAL at 06:34

## 2025-02-01 RX ADMIN — HEPARIN SODIUM 2000 UNITS: 1000 INJECTION INTRAVENOUS; SUBCUTANEOUS at 21:03

## 2025-02-01 RX ADMIN — METRONIDAZOLE 500 MG: 500 INJECTION, SOLUTION INTRAVENOUS at 12:13

## 2025-02-01 RX ADMIN — SODIUM CHLORIDE, PRESERVATIVE FREE 10 ML: 5 INJECTION INTRAVENOUS at 08:49

## 2025-02-01 RX ADMIN — SODIUM CHLORIDE, POTASSIUM CHLORIDE, SODIUM LACTATE AND CALCIUM CHLORIDE 1000 ML: 600; 310; 30; 20 INJECTION, SOLUTION INTRAVENOUS at 01:30

## 2025-02-01 RX ADMIN — POTASSIUM CHLORIDE 40 MEQ: 750 TABLET, EXTENDED RELEASE ORAL at 08:49

## 2025-02-01 RX ADMIN — ALBUMIN (HUMAN) 12.5 G: 12.5 INJECTION, SOLUTION INTRAVENOUS at 14:45

## 2025-02-01 RX ADMIN — SODIUM CHLORIDE, PRESERVATIVE FREE 10 ML: 5 INJECTION INTRAVENOUS at 20:20

## 2025-02-01 RX ADMIN — BENZONATATE 100 MG: 100 CAPSULE ORAL at 06:34

## 2025-02-01 RX ADMIN — METRONIDAZOLE 500 MG: 500 INJECTION, SOLUTION INTRAVENOUS at 04:37

## 2025-02-01 RX ADMIN — HEPARIN SODIUM 15 UNITS/KG/HR: 10000 INJECTION, SOLUTION INTRAVENOUS at 01:35

## 2025-02-01 RX ADMIN — LIDOCAINE HYDROCHLORIDE ANHYDROUS 10 ML: 10 INJECTION, SOLUTION INFILTRATION at 13:06

## 2025-02-01 ASSESSMENT — PAIN SCALES - GENERAL: PAINLEVEL_OUTOF10: 5

## 2025-02-01 ASSESSMENT — PAIN DESCRIPTION - DESCRIPTORS: DESCRIPTORS: DISCOMFORT

## 2025-02-01 ASSESSMENT — PAIN DESCRIPTION - LOCATION: LOCATION: CHEST

## 2025-02-01 ASSESSMENT — PAIN DESCRIPTION - ORIENTATION: ORIENTATION: RIGHT

## 2025-02-01 NOTE — CONSENT
Informed Consent for Blood Component Transfusion Note    I have discussed with the patient the rationale for blood component transfusion; its benefits in treating or preventing fatigue, organ damage, or death; and its risk which includes mild transfusion reactions, rare risk of blood borne infection, or more serious but rare reactions. I have discussed the alternatives to transfusion, including the risk and consequences of not receiving transfusion. The patient had an opportunity to ask questions and had agreed to proceed with transfusion of blood components.    Electronically signed by Katie Horner MD on 2/1/25 at 2:06 AM EST

## 2025-02-01 NOTE — PROCEDURES
Interventional Radiology  Procedure Note        2/1/2025 1:24 PM    Patient: Jose Mc     Informed consent obtained    Diagnosis: right empyema    Procedure(s): US guided placement of 14Fr chest tube into R empyema. Attached to water seal cannister, with low wall suction.    Estimated blood loss: less than 5cc    Anesthesia: local    Specimens removed:  20cc thick purulent fluid for culture; aspirated approximately 1L fluid into water seal cannister    Complications: None    Implants: None    Primary Physician: Jamal Hernández MD    Recommendations: N/A    Full dictated report to follow    Jamal Hernández MD  Interventional Radiology  [facility]  1:24 PM, 2/1/2025

## 2025-02-02 ENCOUNTER — APPOINTMENT (OUTPATIENT)
Facility: HOSPITAL | Age: 70
DRG: 871 | End: 2025-02-02
Payer: MEDICARE

## 2025-02-02 LAB
ABO + RH BLD: NORMAL
ALBUMIN SERPL-MCNC: 1.4 G/DL (ref 3.5–5)
ALBUMIN/GLOB SERPL: 0.3 (ref 1.1–2.2)
ALP SERPL-CCNC: 65 U/L (ref 45–117)
ALT SERPL-CCNC: 9 U/L (ref 12–78)
ANION GAP SERPL CALC-SCNC: 6 MMOL/L (ref 2–12)
AST SERPL-CCNC: 8 U/L (ref 15–37)
BACTERIA SPEC CULT: NORMAL
BACTERIA SPEC CULT: NORMAL
BASOPHILS # BLD: 0.07 K/UL (ref 0–0.1)
BASOPHILS NFR BLD: 0.8 % (ref 0–1)
BILIRUB SERPL-MCNC: 0.4 MG/DL (ref 0.2–1)
BLD PROD TYP BPU: NORMAL
BLOOD BANK BLOOD PRODUCT EXPIRATION DATE: NORMAL
BLOOD BANK DISPENSE STATUS: NORMAL
BLOOD BANK ISBT PRODUCT BLOOD TYPE: 6200
BLOOD BANK PRODUCT CODE: NORMAL
BLOOD BANK UNIT TYPE AND RH: NORMAL
BLOOD GROUP ANTIBODIES SERPL: NORMAL
BPU ID: NORMAL
BUN SERPL-MCNC: 10 MG/DL (ref 6–20)
BUN/CREAT SERPL: 14 (ref 12–20)
CALCIUM SERPL-MCNC: 8.1 MG/DL (ref 8.5–10.1)
CHLORIDE SERPL-SCNC: 111 MMOL/L (ref 97–108)
CO2 SERPL-SCNC: 24 MMOL/L (ref 21–32)
CREAT SERPL-MCNC: 0.69 MG/DL (ref 0.7–1.3)
CROSSMATCH RESULT: NORMAL
DIFFERENTIAL METHOD BLD: ABNORMAL
EKG ATRIAL RATE: 115 BPM
EKG DIAGNOSIS: NORMAL
EKG DIAGNOSIS: NORMAL
EKG P AXIS: 42 DEGREES
EKG P-R INTERVAL: 160 MS
EKG Q-T INTERVAL: 340 MS
EKG Q-T INTERVAL: 366 MS
EKG QRS DURATION: 136 MS
EKG QRS DURATION: 138 MS
EKG QTC CALCULATION (BAZETT): 506 MS
EKG QTC CALCULATION (BAZETT): 524 MS
EKG R AXIS: -16 DEGREES
EKG R AXIS: -5 DEGREES
EKG T AXIS: 20 DEGREES
EKG T AXIS: 22 DEGREES
EKG VENTRICULAR RATE: 115 BPM
EKG VENTRICULAR RATE: 143 BPM
EOSINOPHIL # BLD: 0.13 K/UL (ref 0–0.4)
EOSINOPHIL NFR BLD: 1.4 % (ref 0–7)
ERYTHROCYTE [DISTWIDTH] IN BLOOD BY AUTOMATED COUNT: 15.9 % (ref 11.5–14.5)
GLOBULIN SER CALC-MCNC: 4.4 G/DL (ref 2–4)
GLUCOSE FLD-MCNC: 10 MG/DL
GLUCOSE SERPL-MCNC: 138 MG/DL (ref 65–100)
HCT VFR BLD AUTO: 24.4 % (ref 36.6–50.3)
HGB BLD-MCNC: 7.5 G/DL (ref 12.1–17)
IMM GRANULOCYTES # BLD AUTO: 0.09 K/UL (ref 0–0.04)
IMM GRANULOCYTES NFR BLD AUTO: 1 % (ref 0–0.5)
LDH FLD L TO P-CCNC: 2563 U/L
LYMPHOCYTES # BLD: 1.39 K/UL (ref 0.8–3.5)
LYMPHOCYTES NFR BLD: 14.9 % (ref 12–49)
MCH RBC QN AUTO: 27.3 PG (ref 26–34)
MCHC RBC AUTO-ENTMCNC: 30.7 G/DL (ref 30–36.5)
MCV RBC AUTO: 88.7 FL (ref 80–99)
MONOCYTES # BLD: 0.78 K/UL (ref 0–1)
MONOCYTES NFR BLD: 8.4 % (ref 5–13)
NEUTS SEG # BLD: 6.85 K/UL (ref 1.8–8)
NEUTS SEG NFR BLD: 73.5 % (ref 32–75)
NRBC # BLD: 0 K/UL (ref 0–0.01)
NRBC BLD-RTO: 0 PER 100 WBC
PERIPHERAL SMEAR, MD REVIEW: NORMAL
PLATELET # BLD AUTO: 272 K/UL (ref 150–400)
PMV BLD AUTO: 10.2 FL (ref 8.9–12.9)
POTASSIUM SERPL-SCNC: 3.6 MMOL/L (ref 3.5–5.1)
PROT FLD-MCNC: 1.8 G/DL
PROT SERPL-MCNC: 5.8 G/DL (ref 6.4–8.2)
RBC # BLD AUTO: 2.75 M/UL (ref 4.1–5.7)
SERVICE CMNT-IMP: NORMAL
SODIUM SERPL-SCNC: 141 MMOL/L (ref 136–145)
SPECIMEN EXP DATE BLD: NORMAL
SPECIMEN SOURCE FLD: NORMAL
UFH PPP CHRO-ACNC: 0.21 IU/ML
UFH PPP CHRO-ACNC: 0.24 IU/ML
UFH PPP CHRO-ACNC: 0.33 IU/ML
UNIT DIVISION: 0
UNIT ISSUE DATE/TIME: NORMAL
WBC # BLD AUTO: 9.3 K/UL (ref 4.1–11.1)

## 2025-02-02 PROCEDURE — 6360000002 HC RX W HCPCS

## 2025-02-02 PROCEDURE — 93010 ELECTROCARDIOGRAM REPORT: CPT | Performed by: SPECIALIST

## 2025-02-02 PROCEDURE — 6370000000 HC RX 637 (ALT 250 FOR IP)

## 2025-02-02 PROCEDURE — 2500000003 HC RX 250 WO HCPCS: Performed by: PHYSICIAN ASSISTANT

## 2025-02-02 PROCEDURE — 6360000002 HC RX W HCPCS: Performed by: PHYSICIAN ASSISTANT

## 2025-02-02 PROCEDURE — 99232 SBSQ HOSP IP/OBS MODERATE 35: CPT | Performed by: FAMILY MEDICINE

## 2025-02-02 PROCEDURE — 2500000003 HC RX 250 WO HCPCS

## 2025-02-02 PROCEDURE — 94761 N-INVAS EAR/PLS OXIMETRY MLT: CPT

## 2025-02-02 PROCEDURE — 2580000003 HC RX 258: Performed by: PHYSICIAN ASSISTANT

## 2025-02-02 PROCEDURE — 85025 COMPLETE CBC W/AUTO DIFF WBC: CPT

## 2025-02-02 PROCEDURE — 36415 COLL VENOUS BLD VENIPUNCTURE: CPT

## 2025-02-02 PROCEDURE — 2700000000 HC OXYGEN THERAPY PER DAY

## 2025-02-02 PROCEDURE — 2580000003 HC RX 258

## 2025-02-02 PROCEDURE — 71045 X-RAY EXAM CHEST 1 VIEW: CPT

## 2025-02-02 PROCEDURE — 2060000000 HC ICU INTERMEDIATE R&B

## 2025-02-02 PROCEDURE — 85520 HEPARIN ASSAY: CPT

## 2025-02-02 PROCEDURE — 80053 COMPREHEN METABOLIC PANEL: CPT

## 2025-02-02 RX ORDER — METRONIDAZOLE 500 MG/100ML
500 INJECTION, SOLUTION INTRAVENOUS EVERY 8 HOURS
Status: DISCONTINUED | OUTPATIENT
Start: 2025-02-02 | End: 2025-02-04 | Stop reason: HOSPADM

## 2025-02-02 RX ADMIN — AZITHROMYCIN MONOHYDRATE 500 MG: 500 INJECTION, POWDER, LYOPHILIZED, FOR SOLUTION INTRAVENOUS at 20:13

## 2025-02-02 RX ADMIN — METRONIDAZOLE 500 MG: 500 INJECTION, SOLUTION INTRAVENOUS at 11:36

## 2025-02-02 RX ADMIN — HEPARIN SODIUM 19 UNITS/KG/HR: 10000 INJECTION, SOLUTION INTRAVENOUS at 03:46

## 2025-02-02 RX ADMIN — SODIUM CHLORIDE 5 MG: 9 INJECTION INTRAMUSCULAR; INTRAVENOUS; SUBCUTANEOUS at 13:45

## 2025-02-02 RX ADMIN — METRONIDAZOLE 500 MG: 500 INJECTION, SOLUTION INTRAVENOUS at 03:50

## 2025-02-02 RX ADMIN — HEPARIN SODIUM 21 UNITS/KG/HR: 10000 INJECTION, SOLUTION INTRAVENOUS at 17:23

## 2025-02-02 RX ADMIN — GUAIFENESIN 600 MG: 600 TABLET ORAL at 20:14

## 2025-02-02 RX ADMIN — HEPARIN SODIUM 2000 UNITS: 1000 INJECTION INTRAVENOUS; SUBCUTANEOUS at 03:46

## 2025-02-02 RX ADMIN — GUAIFENESIN 600 MG: 600 TABLET ORAL at 08:01

## 2025-02-02 RX ADMIN — SODIUM CHLORIDE, PRESERVATIVE FREE 10 ML: 5 INJECTION INTRAVENOUS at 20:14

## 2025-02-02 RX ADMIN — DORNASE ALFA 5 MG: 1 SOLUTION RESPIRATORY (INHALATION) at 13:45

## 2025-02-02 RX ADMIN — METRONIDAZOLE 500 MG: 500 INJECTION, SOLUTION INTRAVENOUS at 20:12

## 2025-02-02 RX ADMIN — METOPROLOL TARTRATE 50 MG: 50 TABLET, FILM COATED ORAL at 08:01

## 2025-02-02 RX ADMIN — ATORVASTATIN CALCIUM 20 MG: 20 TABLET, FILM COATED ORAL at 20:14

## 2025-02-02 RX ADMIN — HEPARIN SODIUM 2000 UNITS: 1000 INJECTION INTRAVENOUS; SUBCUTANEOUS at 17:21

## 2025-02-02 RX ADMIN — METOPROLOL TARTRATE 50 MG: 50 TABLET, FILM COATED ORAL at 20:14

## 2025-02-02 RX ADMIN — WATER 1000 MG: 1 INJECTION INTRAMUSCULAR; INTRAVENOUS; SUBCUTANEOUS at 20:16

## 2025-02-02 RX ADMIN — BENZONATATE 100 MG: 100 CAPSULE ORAL at 11:35

## 2025-02-02 RX ADMIN — SODIUM CHLORIDE, PRESERVATIVE FREE 10 ML: 5 INJECTION INTRAVENOUS at 08:07

## 2025-02-02 NOTE — CARE COORDINATION
2/2/25  3:48 PM        Care Management Initial Assessment  2/2/2025 3:48 PM  If patient is discharged prior to next notation, then this note serves as note for discharge by case management.    Reason for Admission:   Pleural effusion [J90]  Community acquired pneumonia of right lung, unspecified part of lung [J18.9]         Patient Admission Status: Inpatient  Date Admitted to INP: 15%  RUR: Readmission Risk Score: 15    Hospitalization in the last 30 days (Readmission):  No        Advance Care Planning:  Code Status: Full Code  Primary Healthcare Decision Maker: (P) Legal Next of Kin   Advance Directive: has an advanced directive - a copy HAS NOT been provided.     __________________________________________________________________________  Assessment:      02/02/25 1543   Service Assessment   Patient Orientation Alert and Oriented   Cognition Alert   Primary Caregiver Self   Support Systems Children;Friends/Neighbors   Patient's Healthcare Decision Maker is: Legal Next of Kin   PCP Verified by CM Yes   Last Visit to PCP Within last year   Prior Functional Level Independent in ADLs/IADLs   Current Functional Level Independent in ADLs/IADLs   Can patient return to prior living arrangement Yes   Ability to make needs known: Good   Family able to assist with home care needs: No   Would you like for me to discuss the discharge plan with any other family members/significant others, and if so, who? No   Financial Resources Medicare   Social/Functional History   Lives With Alone   Type of Home Apartment   Home Layout One level   Home Access Stairs to enter with rails   Entrance Stairs - Number of Steps 2   Bathroom Shower/Tub None   Bathroom Equipment None   Home Equipment None   Prior Level of Assist for ADLs Independent   Prior Level of Assist for Homemaking Independent   Ambulation Assistance Independent   Prior Level of Assist for Transfers Independent   Active  Yes   Mode of Transportation Car   Occupation

## 2025-02-03 ENCOUNTER — APPOINTMENT (OUTPATIENT)
Facility: HOSPITAL | Age: 70
DRG: 871 | End: 2025-02-03
Payer: MEDICARE

## 2025-02-03 PROBLEM — I82.403 LEG DVT (DEEP VENOUS THROMBOEMBOLISM), ACUTE, BILATERAL (HCC): Status: ACTIVE | Noted: 2025-02-03

## 2025-02-03 LAB
ALBUMIN SERPL-MCNC: 1.4 G/DL (ref 3.5–5)
ALBUMIN/GLOB SERPL: 0.4 (ref 1.1–2.2)
ALP SERPL-CCNC: 58 U/L (ref 45–117)
ALT SERPL-CCNC: 7 U/L (ref 12–78)
ANION GAP SERPL CALC-SCNC: 8 MMOL/L (ref 2–12)
AST SERPL-CCNC: 7 U/L (ref 15–37)
BASOPHILS # BLD: 0.04 K/UL (ref 0–0.1)
BASOPHILS NFR BLD: 0.4 % (ref 0–1)
BILIRUB SERPL-MCNC: 0.3 MG/DL (ref 0.2–1)
BUN SERPL-MCNC: 8 MG/DL (ref 6–20)
BUN/CREAT SERPL: 11 (ref 12–20)
CALCIUM SERPL-MCNC: 7.6 MG/DL (ref 8.5–10.1)
CHLORIDE SERPL-SCNC: 111 MMOL/L (ref 97–108)
CO2 SERPL-SCNC: 21 MMOL/L (ref 21–32)
CREAT SERPL-MCNC: 0.71 MG/DL (ref 0.7–1.3)
CYTOLOGY-NON GYN: NORMAL
DIFFERENTIAL METHOD BLD: ABNORMAL
ECHO BSA: 2.13 M2
EOSINOPHIL # BLD: 0.05 K/UL (ref 0–0.4)
EOSINOPHIL NFR BLD: 0.5 % (ref 0–7)
ERYTHROCYTE [DISTWIDTH] IN BLOOD BY AUTOMATED COUNT: 16.4 % (ref 11.5–14.5)
FERRITIN SERPL-MCNC: 642 NG/ML (ref 26–388)
FOLATE SERPL-MCNC: 6 NG/ML (ref 5–21)
GLOBULIN SER CALC-MCNC: 4 G/DL (ref 2–4)
GLUCOSE SERPL-MCNC: 142 MG/DL (ref 65–100)
HCT VFR BLD AUTO: 24.9 % (ref 36.6–50.3)
HGB BLD-MCNC: 7.6 G/DL (ref 12.1–17)
IMM GRANULOCYTES # BLD AUTO: 0.09 K/UL (ref 0–0.04)
IMM GRANULOCYTES NFR BLD AUTO: 0.8 % (ref 0–0.5)
IRON SATN MFR SERPL: 10 % (ref 20–50)
IRON SERPL-MCNC: 9 UG/DL (ref 35–150)
LYMPHOCYTES # BLD: 0.97 K/UL (ref 0.8–3.5)
LYMPHOCYTES NFR BLD: 8.7 % (ref 12–49)
MCH RBC QN AUTO: 26.6 PG (ref 26–34)
MCHC RBC AUTO-ENTMCNC: 30.5 G/DL (ref 30–36.5)
MCV RBC AUTO: 87.1 FL (ref 80–99)
MONOCYTES # BLD: 0.49 K/UL (ref 0–1)
MONOCYTES NFR BLD: 4.4 % (ref 5–13)
NEUTS SEG # BLD: 9.47 K/UL (ref 1.8–8)
NEUTS SEG NFR BLD: 85.2 % (ref 32–75)
NRBC # BLD: 0 K/UL (ref 0–0.01)
NRBC BLD-RTO: 0 PER 100 WBC
PLATELET # BLD AUTO: 289 K/UL (ref 150–400)
PMV BLD AUTO: 10.1 FL (ref 8.9–12.9)
POTASSIUM SERPL-SCNC: 3.5 MMOL/L (ref 3.5–5.1)
PROT SERPL-MCNC: 5.4 G/DL (ref 6.4–8.2)
RBC # BLD AUTO: 2.86 M/UL (ref 4.1–5.7)
RETICS # AUTO: 0.07 M/UL (ref 0.03–0.1)
RETICS/RBC NFR AUTO: 2.6 % (ref 0.7–2.1)
SODIUM SERPL-SCNC: 140 MMOL/L (ref 136–145)
TIBC SERPL-MCNC: 87 UG/DL (ref 250–450)
UFH PPP CHRO-ACNC: 0.29 IU/ML
UFH PPP CHRO-ACNC: 0.34 IU/ML
UFH PPP CHRO-ACNC: 0.36 IU/ML
VIT B12 SERPL-MCNC: 417 PG/ML (ref 193–986)
WBC # BLD AUTO: 11.1 K/UL (ref 4.1–11.1)

## 2025-02-03 PROCEDURE — 2700000000 HC OXYGEN THERAPY PER DAY

## 2025-02-03 PROCEDURE — 2060000000 HC ICU INTERMEDIATE R&B

## 2025-02-03 PROCEDURE — 71045 X-RAY EXAM CHEST 1 VIEW: CPT

## 2025-02-03 PROCEDURE — 94640 AIRWAY INHALATION TREATMENT: CPT

## 2025-02-03 PROCEDURE — 2500000003 HC RX 250 WO HCPCS

## 2025-02-03 PROCEDURE — 36415 COLL VENOUS BLD VENIPUNCTURE: CPT

## 2025-02-03 PROCEDURE — 85025 COMPLETE CBC W/AUTO DIFF WBC: CPT

## 2025-02-03 PROCEDURE — 99233 SBSQ HOSP IP/OBS HIGH 50: CPT | Performed by: FAMILY MEDICINE

## 2025-02-03 PROCEDURE — 99223 1ST HOSP IP/OBS HIGH 75: CPT | Performed by: INTERNAL MEDICINE

## 2025-02-03 PROCEDURE — 94761 N-INVAS EAR/PLS OXIMETRY MLT: CPT

## 2025-02-03 PROCEDURE — 82607 VITAMIN B-12: CPT

## 2025-02-03 PROCEDURE — 6370000000 HC RX 637 (ALT 250 FOR IP)

## 2025-02-03 PROCEDURE — 6360000002 HC RX W HCPCS

## 2025-02-03 PROCEDURE — 85045 AUTOMATED RETICULOCYTE COUNT: CPT

## 2025-02-03 PROCEDURE — 85520 HEPARIN ASSAY: CPT

## 2025-02-03 PROCEDURE — 83550 IRON BINDING TEST: CPT

## 2025-02-03 PROCEDURE — 83540 ASSAY OF IRON: CPT

## 2025-02-03 PROCEDURE — 82728 ASSAY OF FERRITIN: CPT

## 2025-02-03 PROCEDURE — 80053 COMPREHEN METABOLIC PANEL: CPT

## 2025-02-03 PROCEDURE — 82746 ASSAY OF FOLIC ACID SERUM: CPT

## 2025-02-03 RX ORDER — SODIUM CHLORIDE FOR INHALATION 0.9 %
3 VIAL, NEBULIZER (ML) INHALATION PRN
Status: DISCONTINUED | OUTPATIENT
Start: 2025-02-03 | End: 2025-02-04 | Stop reason: HOSPADM

## 2025-02-03 RX ORDER — ALBUTEROL SULFATE 0.83 MG/ML
2.5 SOLUTION RESPIRATORY (INHALATION) EVERY 4 HOURS PRN
Status: DISCONTINUED | OUTPATIENT
Start: 2025-02-03 | End: 2025-02-04 | Stop reason: HOSPADM

## 2025-02-03 RX ORDER — SODIUM CHLORIDE FOR INHALATION 0.9 %
3 VIAL, NEBULIZER (ML) INHALATION PRN
Status: DISCONTINUED | OUTPATIENT
Start: 2025-02-03 | End: 2025-02-03

## 2025-02-03 RX ADMIN — GUAIFENESIN 600 MG: 600 TABLET ORAL at 07:54

## 2025-02-03 RX ADMIN — METOPROLOL TARTRATE 50 MG: 50 TABLET, FILM COATED ORAL at 19:42

## 2025-02-03 RX ADMIN — METRONIDAZOLE 500 MG: 500 INJECTION, SOLUTION INTRAVENOUS at 19:46

## 2025-02-03 RX ADMIN — METRONIDAZOLE 500 MG: 500 INJECTION, SOLUTION INTRAVENOUS at 04:22

## 2025-02-03 RX ADMIN — BENZONATATE 100 MG: 100 CAPSULE ORAL at 07:54

## 2025-02-03 RX ADMIN — GUAIFENESIN 600 MG: 600 TABLET ORAL at 19:42

## 2025-02-03 RX ADMIN — METRONIDAZOLE 500 MG: 500 INJECTION, SOLUTION INTRAVENOUS at 12:55

## 2025-02-03 RX ADMIN — WATER 1000 MG: 1 INJECTION INTRAMUSCULAR; INTRAVENOUS; SUBCUTANEOUS at 19:49

## 2025-02-03 RX ADMIN — BENZONATATE 100 MG: 100 CAPSULE ORAL at 02:22

## 2025-02-03 RX ADMIN — ATORVASTATIN CALCIUM 20 MG: 20 TABLET, FILM COATED ORAL at 19:42

## 2025-02-03 RX ADMIN — HEPARIN SODIUM 23 UNITS/KG/HR: 10000 INJECTION, SOLUTION INTRAVENOUS at 05:38

## 2025-02-03 RX ADMIN — ALBUTEROL SULFATE 2.5 MG: 2.5 SOLUTION RESPIRATORY (INHALATION) at 04:24

## 2025-02-03 RX ADMIN — HEPARIN SODIUM 2000 UNITS: 1000 INJECTION INTRAVENOUS; SUBCUTANEOUS at 01:00

## 2025-02-03 RX ADMIN — METOPROLOL TARTRATE 50 MG: 50 TABLET, FILM COATED ORAL at 07:54

## 2025-02-03 RX ADMIN — SODIUM CHLORIDE, PRESERVATIVE FREE 10 ML: 5 INJECTION INTRAVENOUS at 07:54

## 2025-02-03 RX ADMIN — HEPARIN SODIUM 23 UNITS/KG/HR: 10000 INJECTION, SOLUTION INTRAVENOUS at 19:10

## 2025-02-03 RX ADMIN — SODIUM CHLORIDE, PRESERVATIVE FREE 10 ML: 5 INJECTION INTRAVENOUS at 19:49

## 2025-02-03 NOTE — CONSULTS
Thoracic Surgery Note:    Pleural effusion. Needs IR image guided pigtail. Pleural fluid studies. No acute surgical intervention. Work up of pleural effusion starts with drainage, fluid studies, micro, and cytology.   Further intervention will pend on the above.  Happy to consult on this patient if transferred to [facility].      Jimbo Ruvalcaba MD  Thoracic Surgeon  [facility] Thoracic Surgery at [address], [address]  Phone: [phone]  Fax: [phone]    
VTE and pt needs anticoagulation. Although none of the typical provoking factors present, I suspect either malignancy or infection caused VTE. Nonetheless, hypercoag workup is not recommended for first time VTE. I agree with anticoagulation. Once pt is ready for hospital discharge and not expected to require invasive procedures, I recommend switching from Hep gtt to oral anticoagulation. Choice of anticoagulation can be determined by primary team, but I see no contraindications to DOAC.  -- Agree with Hep gtt and can transition to DOAC at time of hospital discharge.  -- Agree with supplemental O2 for now. Will need to check O2 sats after chest tube removed.   -- Consider abd/pelvis CT with IV contrast when feasible to rule out malignant appearing lesions given empyema and VTE, DVT.    Normocytic anemia:  Unclear duration as Hgb last checked 1 year ago. Retic elevation not to degree expected with this level of anemia.   -- Checking ferritin, iron profile, B12, folate.     Empyema:  S/p chest tube and removal of 1100 purulent fluid. On antibiotics.   -- Cytology pending    H/o prostate cancer:  S/p IMRT completed in 10/2022.     I personally saw and evaluated the patient and performed the key components of medical decision making.  The history, physical exam, and documentation were performed by Ann Schoeneweis, NP.  I reviewed and verified the above documentation and modified it as needed.      Signed By: Lupe Diane MD   2/3/25 at 5:34 PM EST           
nonfocal       Lab Results   Component Value Date/Time     02/01/2025 12:32 AM    K 3.3 02/01/2025 12:32 AM     02/01/2025 12:32 AM    CO2 22 02/01/2025 12:32 AM    BUN 11 02/01/2025 12:32 AM    MG 1.8 02/01/2025 12:32 AM       Lab Results   Component Value Date/Time    WBC 11.3 02/01/2025 01:38 AM    HGB 7.0 02/01/2025 01:38 AM     02/01/2025 01:38 AM    MCV 87.3 02/01/2025 01:38 AM       Lab Results   Component Value Date/Time    INR 1.2 01/31/2025 06:20 PM    APTT 29.4 01/31/2025 06:20 PM    GLOB 4.7 02/01/2025 12:32 AM       No results found for: \"IRON\", \"TIBC\"    Lab Results   Component Value Date/Time    RPR NONREACTIVE 07/26/2011 01:07 PM    TSH 3.290 02/29/2024 10:23 AM          No results found for: \"CPK\", \"BNP\"     No components found for: \"CULT\"    Lab Results   Component Value Date/Time    RPR NONREACTIVE 07/26/2011 01:07 PM       No results found for: \"CPK\"    No results found for: \"COLOR\", \"CASTS\"    IMPRESSION  Large right pleural effusion, most likely loculated.  Concerning for empyema vs malignancy.  Tachycardia  Chronic pulmonary emboli  Anemia  History of prostate cancer  Alcohol use    PLAN  O2 as needed to keep sats > 90%; currently on 6L  Needs chest tube.  Have placed order for IR and ordered labs. Given patient is currently hemodynamically stable, do not suspect this will be done over the weekend unless he further decompensates.    Low threshold to transfer to the ICU if he becomes unstable   Continue broad spectrum abx and f/u cultures  Fu ECHO  Closely follow Hgb and transfuse if < 7  Rate control per primary team  Watch for signs of withdrawal      Thank you for allowing us to participate in the care of this patient.  We will be happy to follow along in his progress with you.    DANA Beard

## 2025-02-04 ENCOUNTER — APPOINTMENT (OUTPATIENT)
Facility: HOSPITAL | Age: 70
DRG: 871 | End: 2025-02-04
Payer: MEDICARE

## 2025-02-04 ENCOUNTER — HOSPITAL ENCOUNTER (INPATIENT)
Facility: HOSPITAL | Age: 70
LOS: 14 days | Discharge: NURSING, REHAB OR LONG-TERM CARE | DRG: 186 | End: 2025-02-18
Attending: INTERNAL MEDICINE | Admitting: HOSPITALIST
Payer: MEDICARE

## 2025-02-04 ENCOUNTER — APPOINTMENT (OUTPATIENT)
Facility: HOSPITAL | Age: 70
DRG: 186 | End: 2025-02-04
Attending: INTERNAL MEDICINE
Payer: MEDICARE

## 2025-02-04 VITALS
HEIGHT: 71 IN | HEART RATE: 102 BPM | OXYGEN SATURATION: 95 % | WEIGHT: 200 LBS | DIASTOLIC BLOOD PRESSURE: 78 MMHG | RESPIRATION RATE: 28 BRPM | TEMPERATURE: 98.7 F | BODY MASS INDEX: 28 KG/M2 | SYSTOLIC BLOOD PRESSURE: 130 MMHG

## 2025-02-04 DIAGNOSIS — A49.1 STREPTOCOCCUS INFECTION: Primary | ICD-10-CM

## 2025-02-04 DIAGNOSIS — J18.9 COMMUNITY ACQUIRED PNEUMONIA OF RIGHT LUNG, UNSPECIFIED PART OF LUNG: ICD-10-CM

## 2025-02-04 DIAGNOSIS — J90 PLEURAL EFFUSION, RIGHT: ICD-10-CM

## 2025-02-04 DIAGNOSIS — J86.9 EMPYEMA LUNG (HCC): ICD-10-CM

## 2025-02-04 DIAGNOSIS — J96.01 ACUTE HYPOXIC RESPIRATORY FAILURE (HCC): ICD-10-CM

## 2025-02-04 LAB
ALBUMIN SERPL-MCNC: 1.2 G/DL (ref 3.5–5)
ALBUMIN/GLOB SERPL: 0.3 (ref 1.1–2.2)
ALP SERPL-CCNC: 56 U/L (ref 45–117)
ALT SERPL-CCNC: 7 U/L (ref 12–78)
ANION GAP SERPL CALC-SCNC: 8 MMOL/L (ref 2–12)
APTT PPP: >130 SEC (ref 22.1–31)
APTT PPP: >130 SEC (ref 22.1–31)
AST SERPL-CCNC: 15 U/L (ref 15–37)
BASOPHILS # BLD: 0.03 K/UL (ref 0–0.1)
BASOPHILS NFR BLD: 0.4 % (ref 0–1)
BILIRUB SERPL-MCNC: 0.6 MG/DL (ref 0.2–1)
BUN SERPL-MCNC: 7 MG/DL (ref 6–20)
BUN/CREAT SERPL: 9 (ref 12–20)
CALCIUM SERPL-MCNC: 7.5 MG/DL (ref 8.5–10.1)
CHLORIDE SERPL-SCNC: 109 MMOL/L (ref 97–108)
CO2 SERPL-SCNC: 21 MMOL/L (ref 21–32)
CREAT SERPL-MCNC: 0.78 MG/DL (ref 0.7–1.3)
DIFFERENTIAL METHOD BLD: ABNORMAL
EOSINOPHIL # BLD: 0.07 K/UL (ref 0–0.4)
EOSINOPHIL NFR BLD: 1 % (ref 0–7)
ERYTHROCYTE [DISTWIDTH] IN BLOOD BY AUTOMATED COUNT: 16.3 % (ref 11.5–14.5)
ERYTHROCYTE [DISTWIDTH] IN BLOOD BY AUTOMATED COUNT: 16.5 % (ref 11.5–14.5)
GLOBULIN SER CALC-MCNC: 4.1 G/DL (ref 2–4)
GLUCOSE SERPL-MCNC: 113 MG/DL (ref 65–100)
HCT VFR BLD AUTO: 23.4 % (ref 36.6–50.3)
HCT VFR BLD AUTO: 23.9 % (ref 36.6–50.3)
HGB BLD-MCNC: 7.1 G/DL (ref 12.1–17)
HGB BLD-MCNC: 7.3 G/DL (ref 12.1–17)
IMM GRANULOCYTES # BLD AUTO: 0.11 K/UL (ref 0–0.04)
IMM GRANULOCYTES NFR BLD AUTO: 1.5 % (ref 0–0.5)
INR PPP: 1.5 (ref 0.9–1.1)
INR PPP: 1.5 (ref 0.9–1.1)
LYMPHOCYTES # BLD: 0.77 K/UL (ref 0.8–3.5)
LYMPHOCYTES NFR BLD: 10.5 % (ref 12–49)
MCH RBC QN AUTO: 26.4 PG (ref 26–34)
MCH RBC QN AUTO: 26.8 PG (ref 26–34)
MCHC RBC AUTO-ENTMCNC: 30.3 G/DL (ref 30–36.5)
MCHC RBC AUTO-ENTMCNC: 30.5 G/DL (ref 30–36.5)
MCV RBC AUTO: 86.6 FL (ref 80–99)
MCV RBC AUTO: 88.3 FL (ref 80–99)
MONOCYTES # BLD: 0.41 K/UL (ref 0–1)
MONOCYTES NFR BLD: 5.6 % (ref 5–13)
NEUTS SEG # BLD: 5.95 K/UL (ref 1.8–8)
NEUTS SEG NFR BLD: 81 % (ref 32–75)
NRBC # BLD: 0 K/UL (ref 0–0.01)
NRBC # BLD: 0 K/UL (ref 0–0.01)
NRBC BLD-RTO: 0 PER 100 WBC
NRBC BLD-RTO: 0 PER 100 WBC
PLATELET # BLD AUTO: 256 K/UL (ref 150–400)
PLATELET # BLD AUTO: 281 K/UL (ref 150–400)
PMV BLD AUTO: 10.2 FL (ref 8.9–12.9)
PMV BLD AUTO: 10.4 FL (ref 8.9–12.9)
POTASSIUM SERPL-SCNC: 3.8 MMOL/L (ref 3.5–5.1)
PROT SERPL-MCNC: 5.3 G/DL (ref 6.4–8.2)
PROTHROMBIN TIME: 15.7 SEC (ref 9.2–11.2)
PROTHROMBIN TIME: 16 SEC (ref 9.2–11.2)
RBC # BLD AUTO: 2.65 M/UL (ref 4.1–5.7)
RBC # BLD AUTO: 2.76 M/UL (ref 4.1–5.7)
SODIUM SERPL-SCNC: 138 MMOL/L (ref 136–145)
THERAPEUTIC RANGE: ABNORMAL SECS (ref 58–77)
THERAPEUTIC RANGE: ABNORMAL SECS (ref 58–77)
UFH PPP CHRO-ACNC: 0.27 IU/ML
UFH PPP CHRO-ACNC: 0.51 IU/ML
UFH PPP CHRO-ACNC: 0.52 IU/ML
WBC # BLD AUTO: 6.9 K/UL (ref 4.1–11.1)
WBC # BLD AUTO: 7.3 K/UL (ref 4.1–11.1)

## 2025-02-04 PROCEDURE — 2500000003 HC RX 250 WO HCPCS: Performed by: HOSPITALIST

## 2025-02-04 PROCEDURE — 85730 THROMBOPLASTIN TIME PARTIAL: CPT

## 2025-02-04 PROCEDURE — 80053 COMPREHEN METABOLIC PANEL: CPT

## 2025-02-04 PROCEDURE — 2060000000 HC ICU INTERMEDIATE R&B

## 2025-02-04 PROCEDURE — 6370000000 HC RX 637 (ALT 250 FOR IP): Performed by: FAMILY MEDICINE

## 2025-02-04 PROCEDURE — 5A0955A ASSISTANCE WITH RESPIRATORY VENTILATION, GREATER THAN 96 CONSECUTIVE HOURS, HIGH NASAL FLOW/VELOCITY: ICD-10-PCS | Performed by: HOSPITALIST

## 2025-02-04 PROCEDURE — 71045 X-RAY EXAM CHEST 1 VIEW: CPT

## 2025-02-04 PROCEDURE — 6360000002 HC RX W HCPCS

## 2025-02-04 PROCEDURE — 36415 COLL VENOUS BLD VENIPUNCTURE: CPT

## 2025-02-04 PROCEDURE — 6360000002 HC RX W HCPCS: Performed by: HOSPITALIST

## 2025-02-04 PROCEDURE — 6370000000 HC RX 637 (ALT 250 FOR IP)

## 2025-02-04 PROCEDURE — 99239 HOSP IP/OBS DSCHRG MGMT >30: CPT | Performed by: FAMILY MEDICINE

## 2025-02-04 PROCEDURE — 85520 HEPARIN ASSAY: CPT

## 2025-02-04 PROCEDURE — 2500000003 HC RX 250 WO HCPCS

## 2025-02-04 PROCEDURE — 94761 N-INVAS EAR/PLS OXIMETRY MLT: CPT

## 2025-02-04 PROCEDURE — 6370000000 HC RX 637 (ALT 250 FOR IP): Performed by: HOSPITALIST

## 2025-02-04 PROCEDURE — 2700000000 HC OXYGEN THERAPY PER DAY

## 2025-02-04 PROCEDURE — 85025 COMPLETE CBC W/AUTO DIFF WBC: CPT

## 2025-02-04 PROCEDURE — 85027 COMPLETE CBC AUTOMATED: CPT

## 2025-02-04 PROCEDURE — 85610 PROTHROMBIN TIME: CPT

## 2025-02-04 PROCEDURE — 71250 CT THORAX DX C-: CPT

## 2025-02-04 RX ORDER — ONDANSETRON 4 MG/1
4 TABLET, ORALLY DISINTEGRATING ORAL EVERY 8 HOURS PRN
Status: DISCONTINUED | OUTPATIENT
Start: 2025-02-04 | End: 2025-02-18 | Stop reason: HOSPADM

## 2025-02-04 RX ORDER — SODIUM CHLORIDE 0.9 % (FLUSH) 0.9 %
5-40 SYRINGE (ML) INJECTION PRN
Status: DISCONTINUED | OUTPATIENT
Start: 2025-02-04 | End: 2025-02-18 | Stop reason: HOSPADM

## 2025-02-04 RX ORDER — HEPARIN SODIUM 1000 [USP'U]/ML
40 INJECTION, SOLUTION INTRAVENOUS; SUBCUTANEOUS PRN
Status: DISCONTINUED | OUTPATIENT
Start: 2025-02-04 | End: 2025-02-13

## 2025-02-04 RX ORDER — MAGNESIUM SULFATE IN WATER 40 MG/ML
2000 INJECTION, SOLUTION INTRAVENOUS PRN
Status: DISCONTINUED | OUTPATIENT
Start: 2025-02-04 | End: 2025-02-07

## 2025-02-04 RX ORDER — POTASSIUM CHLORIDE 750 MG/1
40 TABLET, EXTENDED RELEASE ORAL PRN
Status: DISCONTINUED | OUTPATIENT
Start: 2025-02-04 | End: 2025-02-06

## 2025-02-04 RX ORDER — IOPAMIDOL 755 MG/ML
100 INJECTION, SOLUTION INTRAVASCULAR
Status: DISCONTINUED | OUTPATIENT
Start: 2025-02-04 | End: 2025-02-04 | Stop reason: HOSPADM

## 2025-02-04 RX ORDER — ONDANSETRON 2 MG/ML
4 INJECTION INTRAMUSCULAR; INTRAVENOUS EVERY 6 HOURS PRN
Status: DISCONTINUED | OUTPATIENT
Start: 2025-02-04 | End: 2025-02-18 | Stop reason: HOSPADM

## 2025-02-04 RX ORDER — PANTOPRAZOLE SODIUM 40 MG/1
40 TABLET, DELAYED RELEASE ORAL
Status: DISCONTINUED | OUTPATIENT
Start: 2025-02-05 | End: 2025-02-18 | Stop reason: HOSPADM

## 2025-02-04 RX ORDER — BENZONATATE 100 MG/1
200 CAPSULE ORAL 3 TIMES DAILY
Status: DISCONTINUED | OUTPATIENT
Start: 2025-02-04 | End: 2025-02-18 | Stop reason: HOSPADM

## 2025-02-04 RX ORDER — HEPARIN SODIUM 1000 [USP'U]/ML
80 INJECTION, SOLUTION INTRAVENOUS; SUBCUTANEOUS PRN
Status: DISCONTINUED | OUTPATIENT
Start: 2025-02-04 | End: 2025-02-13

## 2025-02-04 RX ORDER — CODEINE PHOSPHATE AND GUAIFENESIN 10; 100 MG/5ML; MG/5ML
5 SOLUTION ORAL
Status: DISCONTINUED | OUTPATIENT
Start: 2025-02-04 | End: 2025-02-18 | Stop reason: HOSPADM

## 2025-02-04 RX ORDER — MORPHINE SULFATE 4 MG/ML
4 INJECTION, SOLUTION INTRAMUSCULAR; INTRAVENOUS EVERY 4 HOURS PRN
Status: DISCONTINUED | OUTPATIENT
Start: 2025-02-04 | End: 2025-02-17

## 2025-02-04 RX ORDER — HEPARIN SODIUM 10000 [USP'U]/100ML
5-30 INJECTION, SOLUTION INTRAVENOUS CONTINUOUS
Status: DISCONTINUED | OUTPATIENT
Start: 2025-02-04 | End: 2025-02-13

## 2025-02-04 RX ORDER — SODIUM CHLORIDE 9 MG/ML
INJECTION, SOLUTION INTRAVENOUS PRN
Status: DISCONTINUED | OUTPATIENT
Start: 2025-02-04 | End: 2025-02-18 | Stop reason: HOSPADM

## 2025-02-04 RX ORDER — POLYETHYLENE GLYCOL 3350 17 G/17G
17 POWDER, FOR SOLUTION ORAL DAILY PRN
Status: DISCONTINUED | OUTPATIENT
Start: 2025-02-04 | End: 2025-02-18 | Stop reason: HOSPADM

## 2025-02-04 RX ORDER — POTASSIUM CHLORIDE 7.45 MG/ML
10 INJECTION INTRAVENOUS PRN
Status: DISCONTINUED | OUTPATIENT
Start: 2025-02-04 | End: 2025-02-06

## 2025-02-04 RX ORDER — CASTOR OIL AND BALSAM, PERU 788; 87 MG/G; MG/G
OINTMENT TOPICAL 2 TIMES DAILY
Status: DISCONTINUED | OUTPATIENT
Start: 2025-02-04 | End: 2025-02-04 | Stop reason: HOSPADM

## 2025-02-04 RX ORDER — ACETAMINOPHEN 650 MG/1
650 SUPPOSITORY RECTAL EVERY 6 HOURS PRN
Status: DISCONTINUED | OUTPATIENT
Start: 2025-02-04 | End: 2025-02-18 | Stop reason: HOSPADM

## 2025-02-04 RX ORDER — GUAIFENESIN 600 MG/1
600 TABLET, EXTENDED RELEASE ORAL 2 TIMES DAILY
Status: DISCONTINUED | OUTPATIENT
Start: 2025-02-04 | End: 2025-02-18 | Stop reason: HOSPADM

## 2025-02-04 RX ORDER — METRONIDAZOLE 500 MG/100ML
500 INJECTION, SOLUTION INTRAVENOUS EVERY 8 HOURS
Status: DISCONTINUED | OUTPATIENT
Start: 2025-02-04 | End: 2025-02-14

## 2025-02-04 RX ORDER — SODIUM CHLORIDE 0.9 % (FLUSH) 0.9 %
5-40 SYRINGE (ML) INJECTION EVERY 12 HOURS SCHEDULED
Status: DISCONTINUED | OUTPATIENT
Start: 2025-02-04 | End: 2025-02-18 | Stop reason: HOSPADM

## 2025-02-04 RX ORDER — ACETAMINOPHEN 325 MG/1
650 TABLET ORAL EVERY 6 HOURS PRN
Status: DISCONTINUED | OUTPATIENT
Start: 2025-02-04 | End: 2025-02-18 | Stop reason: HOSPADM

## 2025-02-04 RX ORDER — IPRATROPIUM BROMIDE AND ALBUTEROL SULFATE 2.5; .5 MG/3ML; MG/3ML
1 SOLUTION RESPIRATORY (INHALATION) EVERY 4 HOURS PRN
Status: DISCONTINUED | OUTPATIENT
Start: 2025-02-04 | End: 2025-02-10 | Stop reason: SDUPTHER

## 2025-02-04 RX ADMIN — GUAIFENESIN 600 MG: 600 TABLET ORAL at 08:37

## 2025-02-04 RX ADMIN — SODIUM CHLORIDE, PRESERVATIVE FREE 10 ML: 5 INJECTION INTRAVENOUS at 08:37

## 2025-02-04 RX ADMIN — BENZONATATE 200 MG: 100 CAPSULE ORAL at 20:36

## 2025-02-04 RX ADMIN — GUAIFENESIN 600 MG: 600 TABLET, EXTENDED RELEASE ORAL at 20:36

## 2025-02-04 RX ADMIN — SODIUM CHLORIDE, PRESERVATIVE FREE 10 ML: 5 INJECTION INTRAVENOUS at 20:39

## 2025-02-04 RX ADMIN — METOPROLOL TARTRATE 50 MG: 50 TABLET, FILM COATED ORAL at 08:37

## 2025-02-04 RX ADMIN — HEPARIN SODIUM 25 UNITS/KG/HR: 10000 INJECTION, SOLUTION INTRAVENOUS at 06:25

## 2025-02-04 RX ADMIN — METRONIDAZOLE 500 MG: 5 INJECTION, SOLUTION INTRAVENOUS at 19:03

## 2025-02-04 RX ADMIN — WATER 1000 MG: 1 INJECTION INTRAMUSCULAR; INTRAVENOUS; SUBCUTANEOUS at 17:43

## 2025-02-04 RX ADMIN — METRONIDAZOLE 500 MG: 500 INJECTION, SOLUTION INTRAVENOUS at 03:49

## 2025-02-04 RX ADMIN — BENZONATATE 200 MG: 100 CAPSULE ORAL at 17:38

## 2025-02-04 RX ADMIN — HEPARIN SODIUM 2000 UNITS: 1000 INJECTION INTRAVENOUS; SUBCUTANEOUS at 03:46

## 2025-02-04 RX ADMIN — CASTOR OIL AND BALSAM, PERU: 788; 87 OINTMENT TOPICAL at 11:10

## 2025-02-04 RX ADMIN — HEPARIN SODIUM 25 UNITS/KG/HR: 10000 INJECTION, SOLUTION INTRAVENOUS at 17:36

## 2025-02-04 RX ADMIN — Medication 5 ML: at 20:37

## 2025-02-04 RX ADMIN — METRONIDAZOLE 500 MG: 500 INJECTION, SOLUTION INTRAVENOUS at 11:09

## 2025-02-04 ASSESSMENT — PAIN SCALES - GENERAL
PAINLEVEL_OUTOF10: 0
PAINLEVEL_OUTOF10: 0

## 2025-02-04 NOTE — PROGRESS NOTES
Resident Progress Note in Brief    S: Patient seen and examined at bedside.   Patient is awake and alert   Pain is well controlled   Patient has no new increased SOB and no CP at all nor palpitations. Has more coughing and some coughing spells, productive.     O:  /79   Pulse (!) 114   Temp 98.1 °F (36.7 °C) (Oral)   Resp 16   Ht 1.803 m (5' 11\")   Wt 90.7 kg (200 lb)   SpO2 95%   BMI 27.89 kg/m²     Physical Examination:   General appearance - alert  Chest - symmetric air entry, no wheezing, rales, rhonchi in upper lungfields BL.   Heart - tachycardic, irregular, normal S1, S2, no murmurs, rubs, clicks or gallops,   Abdomen - soft, nontender, non distended.   Neurological - alert, oriented, normal speech, no focal findings  Extremities - peripheral pulses normal, no pedal edema, no clubbing or cyanosis    A/P:     Right pleural effusion: chest tube still draining well. Lung exam unchanged. Pt previously on 5L earlier yesterday, then 3L, now 5L. Patient laying comfortable in bed with audible productive cough.   - Will add saline neb prn. Tessalon pearls prn.   - No other acute changes in management.     Tachycardia: telemetry reviewed, sinus tachycardia, no significant changes to EKG from day prior. No ST changes. M/l iso sepsis and coughing spells. Patient is intermittently tachycardic. BP stable.   - CTM      Please see the primary team's daily progress note for the patient's full plan.    Patricia Keith MD  Family Medicine Resident        
    Name: Jose Mc Hospital: [facility]   : 1955 Admit Date: 2025   Phone: [phone]  Room: [facility]/   PCP: Lupillo Hobbs MD  MRN: [id number]   Date: 2025  Code: Full Code          Chart and notes reviewed. Data reviewed. I review the patient's current medications in the medical record at each encounter.  I have evaluated and examined the patient.     HPI:    9:33 AM       History was obtained from patient and chart.      I was asked by Leon Donovan MD to see Jose Mc in consultation for a chief complaint of large pleural effusion and PE.      Mr. Mc is a pleasant 69 year old male who presented to the [place] ED via with increased SOB.  He was noted to be covered in feces as he was unable to get out of bed.  He tells me his SOB began about four days ago and progressively worsened.  Notes associated cough with clear sputum and right sided chest pain.  He denies history of lung disease.  Does not use home O2 or inhalers at baseline.  He reports remote 15 year smoking history, admits to regular alcohol use.  Denies history of heart disease.  Has history of prostate cancer s/p radiation - last saw Dr. Gutierrez May 2024 with plan for yearly follow up.  Denies known sick contacts.  No pets.  He is retired.       RRT was called earlier this morning due to increased SOB.  He was previousy on 4L and subsequently placed on midflow at 9L.  Sats 100% during my assessment.  I weaned to 6L with sats remaining 98%.     Chest CTA personally reviewed.  There are partially occlusive, chronic appearing pulmonary emboli within basilar segmental divisions of the left lower lobe. Changes in the periphery of the left lower lobe lateral basilar segment may be related to ischemia.  Large, loculated effusion of the right lung measuring approximately 15 cm transverse by 12 cm AP by 22 cm craniocaudal. Finding is concerning for empyema versus malignant effusion. There is near 
    Name: Jose Mc Hospital: [facility]   : 1955 Admit Date: 2025   Phone: [phone]  Room: [facility]/   PCP: Lupillo Hobbs MD  MRN: [id number]   Date: 2/3/2025  Code: Full Code          Chart and notes reviewed. Data reviewed. I review the patient's current medications in the medical record at each encounter.  I have evaluated and examined the patient.     HPI:    2:45 PM       History was obtained from patient and chart.      I was asked by Leon Donovan MD to see Jose Mc in consultation for a chief complaint of large pleural effusion and PE.      Mr. Mc is a pleasant 69 year old male who presented to the [place] ED via with increased SOB.  He was noted to be covered in feces as he was unable to get out of bed.  He tells me his SOB began about four days ago and progressively worsened.  Notes associated cough with clear sputum and right sided chest pain.  He denies history of lung disease.  Does not use home O2 or inhalers at baseline.  He reports remote 15 year smoking history, admits to regular alcohol use.  Denies history of heart disease.  Has history of prostate cancer s/p radiation - last saw Dr. Gutierrez May 2024 with plan for yearly follow up.  Denies known sick contacts.  No pets.  He is retired.       RRT was called earlier this morning due to increased SOB.  He was previousy on 4L and subsequently placed on midflow at 9L.  Sats 100% during my assessment.  I weaned to 6L with sats remaining 98%.     Chest CTA personally reviewed.  There are partially occlusive, chronic appearing pulmonary emboli within basilar segmental divisions of the left lower lobe. Changes in the periphery of the left lower lobe lateral basilar segment may be related to ischemia.  Large, loculated effusion of the right lung measuring approximately 15 cm transverse by 12 cm AP by 22 cm craniocaudal. Finding is concerning for empyema versus malignant effusion. There is near 
    Name: Jose Mc Hospital: [facility]   : 1955 Admit Date: 2025   Phone: [phone]  Room: [facility]/   PCP: Lupillo Hobbs MD  MRN: [id number]   Date: 2025  Code: Full Code          Chart and notes reviewed. Data reviewed. I review the patient's current medications in the medical record at each encounter.  I have evaluated and examined the patient.     HPI:    8:16 AM       History was obtained from patient and chart.      I was asked by Leon Donovan MD to see Jose Mc in consultation for a chief complaint of large pleural effusion and PE.      Mr. Mc is a pleasant 69 year old male who presented to the [place] ED via with increased SOB.  He was noted to be covered in feces as he was unable to get out of bed.  He tells me his SOB began about four days ago and progressively worsened.  Notes associated cough with clear sputum and right sided chest pain.  He denies history of lung disease.  Does not use home O2 or inhalers at baseline.  He reports remote 15 year smoking history, admits to regular alcohol use.  Denies history of heart disease.  Has history of prostate cancer s/p radiation - last saw Dr. Gutierrez May 2024 with plan for yearly follow up.  Denies known sick contacts.  No pets.  He is retired.       RRT was called earlier this morning due to increased SOB.  He was previousy on 4L and subsequently placed on midflow at 9L.  Sats 100% during my assessment.  I weaned to 6L with sats remaining 98%.     Chest CTA personally reviewed.  There are partially occlusive, chronic appearing pulmonary emboli within basilar segmental divisions of the left lower lobe. Changes in the periphery of the left lower lobe lateral basilar segment may be related to ischemia.  Large, loculated effusion of the right lung measuring approximately 15 cm transverse by 12 cm AP by 22 cm craniocaudal. Finding is concerning for empyema versus malignant effusion. There is near 
  [address] [place] [address]   Office [phone]  Fax [phone]          Subjective / Objective     Subjective  Overnight Events: Tachycardia 130's   Patient seen and examined at bedside. Reports feeling well this AM. No palpitations/chest pain. Denies CP, SOB, N/V, dysuria.    Respiratory:   5L O2   Vitals:    02/03/25 0745   BP: 118/78   Pulse: (!) 120   Resp:    Temp: 98 °F (36.7 °C)   SpO2: 95%     Physical Examination:   General appearance - alert, well appearing, and in no distress  Chest - On 5L O2 NC, symmetric air entry bilaterally, mildly diminished over R lung base  Heart - normal rate, regular rhythm, normal S1, S2, no murmurs, rubs, clicks or gallops  Abdomen - soft, nontender, nondistended, no masses or organomegaly  Neurological - alert, oriented, normal speech, no focal findings  Skin - warm, dry. No notable rashes  Extremities - peripheral pulses normal, no pedal edema, no clubbing or cyanosis  Psychiatric - normal speech and thought processes    I/O:  02/02 0701 - 02/03 0700  In: 138.6 [I.V.:138.6]  Out: 1420 [Urine:500]  Inpatient Medications  Current Facility-Administered Medications   Medication Dose Route Frequency    albuterol (PROVENTIL) (2.5 MG/3ML) 0.083% nebulizer solution 2.5 mg  2.5 mg Nebulization Q4H PRN    And    sodium chloride nebulizer 0.9 % solution 3 mL  3 mL Nebulization PRN    metroNIDAZOLE (FLAGYL) 500 mg in 0.9% NaCl 100 mL IVPB premix  500 mg IntraVENous Q8H    melatonin tablet 3 mg  3 mg Oral Nightly PRN    guaiFENesin (MUCINEX) extended release tablet 600 mg  600 mg Oral BID    benzonatate (TESSALON) capsule 100 mg  100 mg Oral TID PRN    0.9 % sodium chloride infusion   IntraVENous PRN    heparin (porcine) injection 4,000 Units  4,000 Units IntraVENous PRN    heparin (porcine) injection 2,000 Units  2,000 Units IntraVENous PRN    heparin 25,000 units in dextrose 5% 250 mL (premix) infusion  5-30 Units/kg/hr IntraVENous Continuous    sodium 
  [address] [place] [address]   Office [phone]  Fax [phone]          Subjective / Objective     Subjective  Overnight Events: Air leak in chest tube    Patient seen and examined at bedside. Reports feeling well this AM, endorses some intermittent shortness of breath. Denies CP, N/V, dysuria.    Respiratory:   On 4.5 L NC  Vitals:    02/04/25 0357   BP: 114/65   Pulse: (!) 115   Resp: (!) 31   Temp: 98.8 °F (37.1 °C)   SpO2: 95%     Physical Examination:   General appearance - alert, in no distress  Chest - symmetric air entry, right lung fields with expiratory rhonchi  Heart - normal rate, regular rhythm, normal S1, S2, no murmurs, rubs, clicks or gallops,   Abdomen - soft, nontender, nondistended, no masses or organomegaly  Neurological - alert, oriented, normal speech, no focal findings  Skin - warm, dry. No notable rashes  Extremities - peripheral pulses normal  Psychiatric - normal speech and thought processes    I/O:  02/03 0701 - 02/04 0700  In: 550 [P.O.:550]  Out: 380   Inpatient Medications  Current Facility-Administered Medications   Medication Dose Route Frequency    albuterol (PROVENTIL) (2.5 MG/3ML) 0.083% nebulizer solution 2.5 mg  2.5 mg Nebulization Q4H PRN    And    sodium chloride nebulizer 0.9 % solution 3 mL  3 mL Nebulization PRN    metroNIDAZOLE (FLAGYL) 500 mg in 0.9% NaCl 100 mL IVPB premix  500 mg IntraVENous Q8H    melatonin tablet 3 mg  3 mg Oral Nightly PRN    guaiFENesin (MUCINEX) extended release tablet 600 mg  600 mg Oral BID    benzonatate (TESSALON) capsule 100 mg  100 mg Oral TID PRN    0.9 % sodium chloride infusion   IntraVENous PRN    heparin (porcine) injection 4,000 Units  4,000 Units IntraVENous PRN    heparin (porcine) injection 2,000 Units  2,000 Units IntraVENous PRN    heparin 25,000 units in dextrose 5% 250 mL (premix) infusion  5-30 Units/kg/hr IntraVENous Continuous    sodium chloride flush 0.9 % injection 5-40 mL  5-40 mL IntraVENous 2 
  [address] [address] [place] [id number]   Office [phone]  Fax [phone]       Admission H&P     Name: Jose Mc MRN: [id number]  Sex: Male   YOB: 1955  Age: 69 y.o.  PCP: Lupillo Hobbs MD     Source of Information: patient, medical records    Chief complaint: Shortness of breath    History of Present Illness  Jose Mc is a 69 y.o. male with known history of hypertension, anemia, prostate cancer stage IIa, history of alcohol use disorder, HLD who presents to the ER complaining of shortness of breath.    Patient reports he called EMS from home after experiencing 4 days worsening shortness of breath and cough.  Patient was alone and he reports he normally breathes well at baseline on room air.  Cough has been productive of clear phlegm.  Denies history of COPD.  No fevers at home.  Denies nausea, vomiting, diarrhea, dysuria, chest pain, palpitations.  Reports poor intake over the last 2 days.  Reports single void of urine today.  Has remote 15-pack-year smoking history.  Denies history of heart attack, CHF and stroke.  No leg swelling.  Denies history of prior similar incident.  Denies sick contacts.  Patient reports he drinks 2 alcoholic beverages about 3 times per week, last drink was 1 week ago.    Of note: Has history of prostate cancer the patient reports is confined to his prostate.  He has been receiving radiation for this issue.  States cancer has not metastasized.  Denies back pain.    Further, patient appears to be poor historian regarding his home medications and is unsure of what he is currently taking.    In the ER:      Vitals:  Patient Vitals for the past 8 hrs:   Temp Pulse Resp BP SpO2   01/31/25 1738 98.2 °F (36.8 °C) (!) 142 20 123/84 100 %   01/31/25 1724 -- (!) 131 -- -- --   01/31/25 1645 98.1 °F (36.7 °C) 86 20 137/82 98 %   01/31/25 1552 -- (!) 106 -- (!) 150/84 94 %   01/31/25 1445 -- -- 24 -- --   01/31/25 1444 -- (!) 130 -- -- --         Labs: 
  [address] [place] [address]   Office [phone]  Fax [phone]          Subjective / Objective     Subjective  Overnight Events: NAEON    This AM: Endorsing improvement in SOB and coughing. Able to talk now with intermittent coughing only. Interested in fluid studies results.    Respiratory: 4LNC  BP 90/63   Pulse 98   Temp 98.3 °F (36.8 °C) (Oral)   Resp 22   Ht 1.803 m (5' 11\")   Wt 90.7 kg (200 lb)   SpO2 97%   BMI 27.89 kg/m²    Physical Examination:   General appearance - alert, ill-appearing, intermittently coughing  Chest - Diminished breath sounds over right lung field base, CTABL in upper lung field. 6L NC in place  Heart - normal rate, regular rhythm, normal S1, S2, no murmurs, rubs, clicks or gallops,   Abdomen - soft, nontender, nondistended, no masses or organomegaly  Neurological - alert, oriented, normal speech, no focal findings  Skin - warm, dry. No notable rashes  Extremities - no pedal edema, no clubbing or cyanosis  Psychiatric - normal speech and thought processes    I/O:  02/01 0701 - 02/02 0700  In: 341.7   Out: 1650 [Urine:300]  Inpatient Medications  Current Facility-Administered Medications   Medication Dose Route Frequency    melatonin tablet 3 mg  3 mg Oral Nightly PRN    guaiFENesin (MUCINEX) extended release tablet 600 mg  600 mg Oral BID    benzonatate (TESSALON) capsule 100 mg  100 mg Oral TID PRN    0.9 % sodium chloride infusion   IntraVENous PRN    heparin (porcine) injection 4,000 Units  4,000 Units IntraVENous PRN    heparin (porcine) injection 2,000 Units  2,000 Units IntraVENous PRN    heparin 25,000 units in dextrose 5% 250 mL (premix) infusion  5-30 Units/kg/hr IntraVENous Continuous    sodium chloride flush 0.9 % injection 5-40 mL  5-40 mL IntraVENous 2 times per day    sodium chloride flush 0.9 % injection 5-40 mL  5-40 mL IntraVENous PRN    0.9 % sodium chloride infusion   IntraVENous PRN    ondansetron (ZOFRAN-ODT) disintegrating tablet 4 
06:30: Pt presents with increased WOB, belly breathing, coughing, elevated HR in the 130's stating \" I feel like I can't breathe\".O2 saturations at 98% on 3L, /82 . Significantly decreased breath sounds on the right side. Mucinex and tessalon given, with no improvement. Rapid response called.   
0720 - Bedside shift report given to Ismael TORRES by Nella TORRES  Chest tube drainage system still bubbling.  Pt assessed, Vitals stable, denies SOB or pain.    0924 - aPris NP with pulmonology seen at bedside, states works are in place to possibly get pt to [facility].  
1155- Pt laying in urine stained bedding   Attempted to help pt change linen and gown, pt refusing.    Will attempt later    1540- Pt still refusing...    1640- Pt congruent, Bed bath given and bedding changed.     Skin assessed,  Dorsal gluteal region macerated with redness noted, q2 turns ordered per protocol.  
1300 Order given to clamp chest tube. Verified order with family practice. Chest tube clamped.    1515 Rapid response called for decreased oxygen saturation. Pateint currently at 82% oxygen saturation. Oxygen increased to 8L nasal cannula. Family practice notified.  Orders given for stat chest xray. Pateint oxygen saturation recovering. Oxygen decreased back to 6L nasal cannula.  
1300 Verified order with family practice MD Dr. Vicente to have primary RN clamp chest tube. Notified Jessica, primary RN.    1515 Orders received from Dr. Irwin to continue to have chest tube clamped until MD gives further orders and chest X-Ray results. MELISSA Lopez at bedside during Rapid Response. Updated Chest tube documentation in the chart.   
1302- orders for cathflo and pulmozyme to be given intrapleural by physician only. This RN called pulmonary associates office to clarify order and administration. Answering service was unavailable, this RN left message with call back number.  1312- Attempted to call answering service again. Service unavailable. Will notify attending.  
1530- Transport on floor to transfer ptTHOMAS signed.  SBAR report given to Transport    1535 - SBAR report given via telephone to Karen TORRES at [facility] .      Richmond Madsen    
1740 Pt arrived to IMCU from [place] ED. Pt coughing and HR is elevated in 140's. Family practice MD notified.     1752 Family Practice medicine at bedside. EKG obtained and placed in patient chart. Labs ordered.   
1900: Bedside shift change report given to Nella RN (oncoming nurse) by Artur TORRES (offgoing nurse). Report included the following information Nurse Handoff Report.      0000: Pts chest tube drainage system full. 200ml output from beginning of shift until now. Fluid is still very thick and purulent. New drainage system attached to chest tube.     0225: Pt HR elevated to the 120s, O2 87% on 3L NC. Increased O2 to 5L NC, saturations back up to 96%. Pt having excessive coughing episodes. Tessalon given for cough. MD notified.     0259: MD at bedside. PRN saline nebs ordered.      0700: Bedside shift change report given to Ismael TORRES (oncoming nurse) by Nella TORRES (offgoing nurse). Report included the following information Nurse Handoff Report.      
1900: Bedside shift change report given to Nella RN (oncoming nurse) by Jessica RN (offgoing nurse). Report included the following information Nurse Handoff Report.      2020: Chest tube unclamped.     0000: Hgb 6.8. 1 Unit PRBC transfusing     0700: Bedside shift change report given to Artur TORRES (oncoming nurse) by Nella RN (offgoing nurse). Report included the following information Nurse Handoff Report.    
1900: Bedside shift change report given to Nella TORRES (oncoming nurse) by Ismael TORRES (offgoing nurse). Report included the following information Nurse Handoff Report.      1945: During assessment noticed that the chest tube drainage system was continuously bubbling. Changed the drainage system and tubing and it was still bubbling. On assessment of the insertion site I noticed that all around the insertion site and on the right side of the back there was crepitus, indicating subcutaneous emphysema. Vital signs stable. Pt denying any pain or SOB. MD notified and called the pulmonologist on call.     2145: MD at bedside, assessed pt. Is not too concerned about subcutaneous emphysema, states he has had it. Pulmonology called back and stated that the pt has an air leak, keep the drainage system on continuous suction at -20,  repeat Cxray, will possibly need to go to [facility] in the AM to be seen by thoracic surgery. No other orders at this time. Will continue to monitor.     0700: Bedside shift change report given to Ismael TORRES (oncoming nurse) by Nella TORRES (offgoing nurse). Report included the following information Nurse Handoff Report.        
Agree to consult for transfer to [facility].    Will need repeat CT Chest. Chest tube to suction [id number]). Recommend replacement of pleuravac when arrives. One does of pleuralytics only. Will re-evaluate repeat imaging and decide next steps. No urgent intervention today.    Jimbo Ruvalcaba MD  Thoracic Surgeon  [facility] Thoracic Surgery at [address], [address]  Phone: [phone]  Fax: [phone]    
[facility] RESIDENCY PROGRAM   Senior Resident Admission Note    CC: Shortness of Breath      HPI:  Jose Mc is a 69 y.o. male w/ a known history of HTN, anemia, prostate cancer stage Iia undergoing radiation, alcohol use disorder, HLD who presents for SOB.     Chart reviewed. Patient seen, examined, and discussed with Dr. Driver (PGY-1). See H&P note for more details.    Pertinent ED Findings:  Vitals:  Patient Vitals for the past 8 hrs:   Temp Pulse Resp BP SpO2   01/31/25 2015 99 °F (37.2 °C) 80 20 138/77 100 %   01/31/25 1738 98.2 °F (36.8 °C) (!) 142 20 123/84 100 %   01/31/25 1724 -- (!) 131 -- -- --   01/31/25 1645 98.1 °F (36.7 °C) 86 20 137/82 98 %   01/31/25 1552 -- (!) 106 -- (!) 150/84 94 %         Labs:   Recent Labs     01/31/25  1359 01/31/25  1815   WBC 13.2* 13.2*   HGB 9.2* 8.5*   HCT 30.4* 28.8*    369     Recent Labs     01/31/25  1359 01/31/25 2018    142   K 3.4* 3.6    109*   CO2 22 22   BUN 12 11     Recent Labs     01/31/25  1359 01/31/25  2018   GLOB 6.1* 5.2*     Recent Labs     01/31/25  1820   INR 1.2*   APTT 29.4     Imaging:   Xray Result (most recent):  XR CHEST PORTABLE 01/31/2025    Narrative  EXAM: XR CHEST PORTABLE  ACC#: [id number]    INDICATION: cough/sob    COMPARISON: None.    TECHNIQUE: AP portable upright view of the chest.    FINDINGS:    There is near complete opacification of the right hemithorax. There is focal  density in the left lung base.  The cardiomediastinal configuration is within normal limits.  No acute bony abnormalities.    Impression  New complete opacification of the right hemithorax. This may be due to a large  pleural effusion. There may be underlying atelectasis or acute infiltrate.  Additional infiltrate is suspected in the left lung base.      Electronically signed by IGNACIO Tesfaye        CT Result (most recent):  CTA CHEST W WO CONTRAST 01/31/2025    Narrative  EXAM: CTA CHEST W WO CONTRAST  ACC#: 
Bilateral  LE venous duplex completed. Preliminary report given to Kemal Bartlett MD and Chung Lopez RN  via perfect serve.   
CTS note reviewed.  Agree with 1/2 dose of tpa, but will give with dornase as they work better together and anemia and therapeutic anticoagulation should not be a barrier to pulmozyme administration.  Discussed with pulmonary attending, Dr. Sams.   5mg tpa and 5mg dornase administered intrapleurally at 1:45pm and valve turned to off.  Valve will need to be turned to on in 1 hour at 2:45pm.  Discussed with nursing.  Repeat CXR tomorrow am.  Will assess need for additional lytics tomorrow pending chest tube output and repeat imaging.  
Consult noted.  Pt has a large right sided, most likely loculated effusion.  His left lung is clear.    Has developed A fib with RVR, Will be on a heparin Drip.    We will see pt tomorrow has is RR and oxygenation are currently stable.  If any acute changes please let us know.    If becomes unstable from a hemodynamics or oxygenation standpoint please consult the ICU.     Thank you.       
RRT note:    Called to bedside to evaluate patient CT.  Patient has chest tube placed today  by IR, 1090 of green/purulent fluid out shortly after CT placed.    Chest tube clamped at 1300.    Called Dr Donovan discussed treatment plan with the CT.  He requested to have Chest tube unclamped at this time.      Discussed treatment plan with bedside RN, she will call RRT if patient has any changes in current condition.    NICKI Rosen RN  
Rapid Called at 1516    Responded to RRT at 1518 for Hypoxia    Provider at bedside: YES  Interventions ordered: STAT Chest XR  Sepsis Suspected: No  Transfer to Higher Level of Care: no   Pt with O2 sats of 82% on 6L per primary RN. O2 increased to 8L. Sats improved. Xray obtained.     Pt in no acute distress.     Vitals:    02/01/25 1530   BP: 106/76   Pulse: (!) 109   Resp: 18   Temp: 98.9 °F (37.2 °C)   SpO2: 92%        Rapid Ended at 1540  RRT RN assisted with transport to accepting unit TRUMAN Estrada RN    
Rapid Called at 650    Responded to RRT at 651 for SOB    Provider at bedside: YES  Interventions ordered: Other (Comment)    Pt with increasing SOB r/t to condition. Oxygenating appropriately at 97%. Mildly increased work of breathing. Pulse 108. MD at bedside and consulting pulmonary for plan of care.     Rapid Ended at 700  RRT RN assisted with transport to accepting unit TRUMAN Estrada RN    
Thoracic Surgery Note:    Patient received pigtail by IR with purulent exudative effusion. Drained 1.6L. Still with some fluid on imaging. Agree with pulmonology note. Would recommend trial of pleurolytics prior to consideration of thoracic surgery. Given need for therapeutic anticoagulation and anemia, recommend pleurolytic administration with 1/2 dose tPA (5 mg) without dornase.     Able to complete pleurolytic therapy at [facility] as well as treat potential infection with following of cultures. Defer transfer to [facility] at this time. Please call thoracic surgery back if failure of fibrinolytics.    Jimbo Ruvalcaba MD  Thoracic Surgeon  [facility] Thoracic Surgery at [address], [address]  Phone: [phone]  Fax: [phone]    
lb)   SpO2 95%   BMI 27.89 kg/m²     ECOG PS: 2  General: disheveled; no distress  Eyes: anicteric sclerae  HENT: oropharynx clear  Neck: supple  Respiratory: normal respiratory effort/ NC 5L/min/ CT noted to Rt side   CV: no peripheral edema  GI: soft, nontender, nondistended, no masses  Skin: no rashes; no ecchymoses; no petechiae    Results:     Lab Results   Component Value Date    WBC 7.3 02/04/2025    HGB 7.3 (L) 02/04/2025    HCT 23.9 (L) 02/04/2025     02/04/2025    MCV 86.6 02/04/2025    NEUTROABS 5.95 02/04/2025     Lab Results   Component Value Date     02/04/2025    K 3.8 02/04/2025     (H) 02/04/2025    CO2 21 02/04/2025    GLUCOSE 113 (H) 02/04/2025    BUN 7 02/04/2025    CREATININE 0.78 02/04/2025    LABGLOM >90 02/04/2025    CALCIUM 7.5 (L) 02/04/2025    MG 1.8 02/01/2025     Lab Results   Component Value Date    BILITOT 0.6 02/04/2025    ALT 7 (L) 02/04/2025    AST 15 02/04/2025    ALKPHOS 56 02/04/2025    PROTEIN 5.3 (L) 02/04/2025    ALBUMIN 1.2 (L) 02/04/2025    GLOB 4.1 (H) 02/04/2025     Lab Results   Component Value Date    RETICAUTO 2.6 (H) 02/03/2025    IRON 9 (L) 02/03/2025    TIBC 87 (L) 02/03/2025    IRONPERSAT 10 (L) 02/03/2025    FERRITIN 642 (H) 02/03/2025    XWKWKPAI09 417 02/03/2025    FOLATE 6.0 02/03/2025     02/01/2025     Lab Results   Component Value Date    INR 1.2 (H) 01/31/2025    PROTIME 12.7 (H) 01/31/2025    APTT 29.4 01/31/2025    DDIMER 3.60 (H) 01/31/2025    LABANTI NEG 02/01/2025    TSH 3.290 02/29/2024     Lab Results   Component Value Date     02/01/2025 1/31/25 XR chest  IMPRESSION:  New complete opacification of the right hemithorax. This may be due to a large  pleural effusion. There may be underlying atelectasis or acute infiltrate.  Additional infiltrate is suspected in the left lung base.     1/31/25 CTA Chest w wo contrast   IMPRESSION:   1. There are partially occlusive, chronic appearing pulmonary emboli within  basilar 
complete opacification of the right hemithorax. This may be due to a large  pleural effusion. There may be underlying atelectasis or acute infiltrate.  Additional infiltrate is suspected in the left lung base.      Electronically signed by IGNACIO Tesfaye    CT Result (most recent):  CTA CHEST W WO CONTRAST 01/31/2025    Narrative  EXAM: CTA CHEST W WO CONTRAST  ACC#: [id number]    INDICATION: Shortness of breath, evaluate for pulmonary embolism, []    COMPARISON: Chest radiograph January 31, 2025    TECHNIQUE: Helical thin section chest CT following uneventful intravenous  administration of nonionic contrast 100 mL of isovue 370 according to  departmental PE protocol. Coronal and sagittal reformats were performed. Coronal  and Sagittal MIP images were generated.  CT dose reduction was achieved through  the use of a standardized protocol tailored for this examination and automatic  exposure control for dose modulation.    FINDINGS: This is a good quality study for the evaluation of pulmonary embolism  to the first subsegmental arterial level. Partially occlusive, eccentric  thrombus is seen within the distal left lower lobe pulmonary artery extending  into lateral basilar and posterior basilar segmental divisions. No right lung  thrombus is seen. The main pulmonary artery is normal in caliber.    MEDIASTINUM: No mass or lymphadenopathy.  JUAN: No mass or lymphadenopathy.  THORACIC AORTA: No aneurysm.  HEART: Normal in size. Coronary vessel atheromatous calcifications are noted.  ESOPHAGUS: No wall thickening or dilatation.  TRACHEA/BRONCHI: Patent.  PLEURA: There is a large right-sided pleural effusion with a peripheral rind.  The effusion measures approximately 15 cm transverse. It measures 12 cm AP by 22  cm craniocaudal on sagittal series 602, slice 56. There is a tiny air locule  seen within the loculated effusion. Towards the right lung apex, there are  additional peripheral enhancing loculations with air bubbles.

## 2025-02-04 NOTE — H&P
History and Physical    Date of Service:  2/4/2025  Primary Care Provider: Lupillo Hobbs MD  Source of information: The patient and Chart review    Chief Complaint: No chief complaint on file.      History of Presenting Illness:   Jose Mc is a 69 y.o. male who presents with transfer from [facility] .due to large pleural effusion      \"Jose Mc is a 69 y.o. male with known history of hypertension, anemia, prostate cancer stage IIa, history of alcohol use disorder, HLD who presents to the [facility]  ER 1/31 complaining of shortness of breath and 4 days worsening shortness of breath and cough.    Denies history of COPD.   Has remote 15-pack-year smoking history.  He was tx as pna and found to have large right side pleural effusion. S/p chest tube placement with IR 2/1. CTA with large loculated effusion of the R lung (15 x 12 x 22cm). Fluid culture with moderate alpha streptococcus, streptococcus intermedius.  S/p intrapleural lytics 2/2. Also developed Subcutaneous emphysema after chest tube. He was transferred to [facility] for thoracis consult.   Also has Chronic pulmonary emboli  Acute bilateral LE DVT , he is on heparin drip.        REVIEW OF SYSTEMS:  GENERAL: hpi, low appetite, poor sleep  HEENT: no headache, no vision changes, no nose discharge, no hearing changes   RES: HPI   CVS: no cp, no palpitation.  Muscular: no joint swelling, no muscle pain, no leg swelling, no calf tendreness   Skin: no rash, no itching   GI: no vomiting, no diarrhea  : no dysuria, no hematuria  Hemo: no gum bleeding, no petechial   Neuro: no sensation changes, no focal weakness , decrease activities   Endo: no polydipsia   Psych: denied depression      Past Medical History:   Diagnosis Date    Cancer (HCC) 07/2022    prostate, radiation    Depression     Hypertension     Stroke (HCC)     tia behind his eye branch retina artery      Past Surgical History:   Procedure Laterality Date    CATARACT EXTRACTION, BILATERAL  2022

## 2025-02-04 NOTE — CARE COORDINATION
2:45 PM  02/04/25    Care Management Progress Note    Reason for Admission:   Pleural effusion [J90]  Community acquired pneumonia of right lung, unspecified part of lung [J18.9]         Patient Admission Status: Inpatient  Date Admitted to INP:  1/31/25      RUR: Readmission Risk Score: 13.8    Hospitalization in the last 30 days (Readmission):  No        Transition of care plan:  Pt discussed during IDR- pulmonology following.  Plan is to transfer pt to [facility] for cardiothoracic evaluation; transfer center has been contacted and pending bed availability. Pt aware and agreeable.  Date IM given: 1/31/25  Outpatient follow-up.  Discharge transport: Transfer center will arrange transportation to [facility].    ELIZABETH Rajput

## 2025-02-05 ENCOUNTER — APPOINTMENT (OUTPATIENT)
Facility: HOSPITAL | Age: 70
DRG: 186 | End: 2025-02-05
Attending: INTERNAL MEDICINE
Payer: MEDICARE

## 2025-02-05 ENCOUNTER — TELEPHONE (OUTPATIENT)
Facility: CLINIC | Age: 70
End: 2025-02-05

## 2025-02-05 PROBLEM — D72.829 LEUKOCYTOSIS: Status: ACTIVE | Noted: 2025-02-05

## 2025-02-05 PROBLEM — J98.11 COLLAPSE OF RIGHT LUNG: Status: ACTIVE | Noted: 2025-02-05

## 2025-02-05 PROBLEM — J86.9 EMPYEMA (HCC): Status: ACTIVE | Noted: 2025-02-05

## 2025-02-05 PROBLEM — J90 PLEURAL EFFUSION, RIGHT: Status: ACTIVE | Noted: 2025-02-05

## 2025-02-05 LAB
ALBUMIN SERPL-MCNC: 1.3 G/DL (ref 3.5–5)
ALBUMIN/GLOB SERPL: 0.3 (ref 1.1–2.2)
ALP SERPL-CCNC: 50 U/L (ref 45–117)
ALT SERPL-CCNC: 8 U/L (ref 12–78)
ANION GAP SERPL CALC-SCNC: 9 MMOL/L (ref 2–12)
AST SERPL-CCNC: 36 U/L (ref 15–37)
BASOPHILS # BLD: 0.03 K/UL (ref 0–0.1)
BASOPHILS NFR BLD: 0.5 % (ref 0–1)
BILIRUB SERPL-MCNC: 0.2 MG/DL (ref 0.2–1)
BUN SERPL-MCNC: 7 MG/DL (ref 6–20)
BUN/CREAT SERPL: 11 (ref 12–20)
CALCIUM SERPL-MCNC: 7.6 MG/DL (ref 8.5–10.1)
CHLORIDE SERPL-SCNC: 109 MMOL/L (ref 97–108)
CO2 SERPL-SCNC: 20 MMOL/L (ref 21–32)
CREAT SERPL-MCNC: 0.61 MG/DL (ref 0.7–1.3)
DIFFERENTIAL METHOD BLD: ABNORMAL
EOSINOPHIL # BLD: 0.05 K/UL (ref 0–0.4)
EOSINOPHIL NFR BLD: 0.9 % (ref 0–7)
ERYTHROCYTE [DISTWIDTH] IN BLOOD BY AUTOMATED COUNT: 16.6 % (ref 11.5–14.5)
GLOBULIN SER CALC-MCNC: 4 G/DL (ref 2–4)
GLUCOSE SERPL-MCNC: 86 MG/DL (ref 65–100)
HCT VFR BLD AUTO: 27.9 % (ref 36.6–50.3)
HGB BLD-MCNC: 7.5 G/DL (ref 12.1–17)
IMM GRANULOCYTES # BLD AUTO: 0.05 K/UL (ref 0–0.04)
IMM GRANULOCYTES NFR BLD AUTO: 0.9 % (ref 0–0.5)
LYMPHOCYTES # BLD: 0.8 K/UL (ref 0.8–3.5)
LYMPHOCYTES NFR BLD: 13.8 % (ref 12–49)
MCH RBC QN AUTO: 26 PG (ref 26–34)
MCHC RBC AUTO-ENTMCNC: 26.9 G/DL (ref 30–36.5)
MCV RBC AUTO: 96.9 FL (ref 80–99)
MONOCYTES # BLD: 0.4 K/UL (ref 0–1)
MONOCYTES NFR BLD: 6.9 % (ref 5–13)
NEUTS SEG # BLD: 4.47 K/UL (ref 1.8–8)
NEUTS SEG NFR BLD: 77 % (ref 32–75)
NRBC # BLD: 0 K/UL (ref 0–0.01)
NRBC BLD-RTO: 0 PER 100 WBC
PLATELET # BLD AUTO: 248 K/UL (ref 150–400)
PMV BLD AUTO: 10.1 FL (ref 8.9–12.9)
POTASSIUM SERPL-SCNC: 3.6 MMOL/L (ref 3.5–5.1)
PROT SERPL-MCNC: 5.3 G/DL (ref 6.4–8.2)
RBC # BLD AUTO: 2.88 M/UL (ref 4.1–5.7)
RBC MORPH BLD: ABNORMAL
SODIUM SERPL-SCNC: 138 MMOL/L (ref 136–145)
UFH PPP CHRO-ACNC: 0.54 IU/ML
WBC # BLD AUTO: 5.8 K/UL (ref 4.1–11.1)

## 2025-02-05 PROCEDURE — 6360000002 HC RX W HCPCS: Performed by: INTERNAL MEDICINE

## 2025-02-05 PROCEDURE — 85025 COMPLETE CBC W/AUTO DIFF WBC: CPT

## 2025-02-05 PROCEDURE — APPSS30 APP SPLIT SHARED TIME 16-30 MINUTES

## 2025-02-05 PROCEDURE — 36415 COLL VENOUS BLD VENIPUNCTURE: CPT

## 2025-02-05 PROCEDURE — 6370000000 HC RX 637 (ALT 250 FOR IP): Performed by: HOSPITALIST

## 2025-02-05 PROCEDURE — 99222 1ST HOSP IP/OBS MODERATE 55: CPT | Performed by: INTERNAL MEDICINE

## 2025-02-05 PROCEDURE — 2700000000 HC OXYGEN THERAPY PER DAY

## 2025-02-05 PROCEDURE — 6360000002 HC RX W HCPCS: Performed by: HOSPITALIST

## 2025-02-05 PROCEDURE — 2580000003 HC RX 258: Performed by: STUDENT IN AN ORGANIZED HEALTH CARE EDUCATION/TRAINING PROGRAM

## 2025-02-05 PROCEDURE — 97161 PT EVAL LOW COMPLEX 20 MIN: CPT

## 2025-02-05 PROCEDURE — APPSS15 APP SPLIT SHARED TIME 0-15 MINUTES

## 2025-02-05 PROCEDURE — 97530 THERAPEUTIC ACTIVITIES: CPT

## 2025-02-05 PROCEDURE — G0545 PR INHERENT VISIT TO INPT: HCPCS | Performed by: INTERNAL MEDICINE

## 2025-02-05 PROCEDURE — 2500000003 HC RX 250 WO HCPCS: Performed by: HOSPITALIST

## 2025-02-05 PROCEDURE — 6360000002 HC RX W HCPCS: Performed by: STUDENT IN AN ORGANIZED HEALTH CARE EDUCATION/TRAINING PROGRAM

## 2025-02-05 PROCEDURE — 99222 1ST HOSP IP/OBS MODERATE 55: CPT

## 2025-02-05 PROCEDURE — 80053 COMPREHEN METABOLIC PANEL: CPT

## 2025-02-05 PROCEDURE — 71045 X-RAY EXAM CHEST 1 VIEW: CPT

## 2025-02-05 PROCEDURE — 2500000003 HC RX 250 WO HCPCS: Performed by: INTERNAL MEDICINE

## 2025-02-05 PROCEDURE — 97166 OT EVAL MOD COMPLEX 45 MIN: CPT

## 2025-02-05 PROCEDURE — 2060000000 HC ICU INTERMEDIATE R&B

## 2025-02-05 PROCEDURE — 94760 N-INVAS EAR/PLS OXIMETRY 1: CPT

## 2025-02-05 PROCEDURE — 85520 HEPARIN ASSAY: CPT

## 2025-02-05 RX ORDER — METOPROLOL TARTRATE 25 MG/1
12.5 TABLET, FILM COATED ORAL 2 TIMES DAILY
Status: DISCONTINUED | OUTPATIENT
Start: 2025-02-05 | End: 2025-02-07

## 2025-02-05 RX ADMIN — Medication 5 ML: at 20:40

## 2025-02-05 RX ADMIN — GUAIFENESIN 600 MG: 600 TABLET, EXTENDED RELEASE ORAL at 20:40

## 2025-02-05 RX ADMIN — BENZONATATE 200 MG: 100 CAPSULE ORAL at 16:05

## 2025-02-05 RX ADMIN — HEPARIN SODIUM 25 UNITS/KG/HR: 10000 INJECTION, SOLUTION INTRAVENOUS at 16:05

## 2025-02-05 RX ADMIN — WATER 2000 MG: 1 INJECTION INTRAMUSCULAR; INTRAVENOUS; SUBCUTANEOUS at 18:39

## 2025-02-05 RX ADMIN — PANTOPRAZOLE SODIUM 40 MG: 40 TABLET, DELAYED RELEASE ORAL at 07:10

## 2025-02-05 RX ADMIN — METOPROLOL TARTRATE 12.5 MG: 25 TABLET, FILM COATED ORAL at 09:11

## 2025-02-05 RX ADMIN — BENZONATATE 200 MG: 100 CAPSULE ORAL at 08:10

## 2025-02-05 RX ADMIN — SODIUM CHLORIDE, PRESERVATIVE FREE 10 ML: 5 INJECTION INTRAVENOUS at 20:40

## 2025-02-05 RX ADMIN — METOPROLOL TARTRATE 12.5 MG: 25 TABLET, FILM COATED ORAL at 20:39

## 2025-02-05 RX ADMIN — METRONIDAZOLE 500 MG: 5 INJECTION, SOLUTION INTRAVENOUS at 11:46

## 2025-02-05 RX ADMIN — GUAIFENESIN 600 MG: 600 TABLET, EXTENDED RELEASE ORAL at 08:10

## 2025-02-05 RX ADMIN — SODIUM CHLORIDE, PRESERVATIVE FREE 10 ML: 5 INJECTION INTRAVENOUS at 08:10

## 2025-02-05 RX ADMIN — SODIUM CHLORIDE 6 MG: 9 INJECTION INTRAMUSCULAR; INTRAVENOUS; SUBCUTANEOUS at 07:52

## 2025-02-05 RX ADMIN — BENZONATATE 200 MG: 100 CAPSULE ORAL at 20:40

## 2025-02-05 RX ADMIN — HEPARIN SODIUM 25 UNITS/KG/HR: 10000 INJECTION, SOLUTION INTRAVENOUS at 05:39

## 2025-02-05 RX ADMIN — METRONIDAZOLE 500 MG: 5 INJECTION, SOLUTION INTRAVENOUS at 18:45

## 2025-02-05 RX ADMIN — METRONIDAZOLE 500 MG: 5 INJECTION, SOLUTION INTRAVENOUS at 03:56

## 2025-02-05 ASSESSMENT — PAIN SCALES - GENERAL
PAINLEVEL_OUTOF10: 0

## 2025-02-05 NOTE — CONSULTS
Infectious Disease Consult    INFECTIOUS DISEASE Attending:     I agree with the above infectious disease daily progress note in its entirety as authored by and discussed in detail with the nurse practitioner.   I have reviewed pertinent laboratory studies, microbiology cultures, radiologic reports with review of the consultations and progress notes as appropriate.   I agree with today's subjective findings, physical examination, assessment and plan of care as described above and discussed extensively with the nurse practitioner.       Denies SOB nor chest pain, tolerating PO diet, CT with noted purulent fluid draining.    Exam:  NAD, calm and comfortable, borderline tachycardia, no increased WOB, no rash nor jaundice.    Plan:    Increased dose of Ceftriaxone to 2g IV q24hrs.    Continue Flagyl.    tPA and Dornase per thoracic surgery, input appreciated for CT management.    A total time of 35 minutes was spent on today's encounter.  Greater than 50% of the time was spent on the following:  Preparing for visit and chart review.  Obtaining and/or reviewing separately obtained history  Performing a medically appropriate exam and/or evaluation  Counseling and educating a patient/family/caregiver as noted above  Placing relevant orders  Referring and communicating with other professionals (not separately reported)  Independently interpreting results (not separately reported) and communicating results to the patient/family/caregiver  Care coordination (not separately reported) as noted above  Documenting clinical information in the electronic health records (e.g. problem list, visit note) on the day of the encounter      Impression/Plan     69 y.o. male with past medical Hx of prostate cancer stage Iia s/ radiation, history of alcohol use disorder, past smoker who presented to freestanding ED with SOB, admitted for large pleural effusion with cultures growing strep intermedius.     Right pleural 
surgery approval.  A.m. chest x-rays    Thank you for consulting me to care for this patient. If you have any further questions or concerns regarding this patient, please reach out.     I spent 50 minutes in this consult, reviewing labs, imaging, chart review, the majority of which was face to face time performing history and physical and counseling the patient at the bedside. Discussed with Dr. Ruvalcaba.     Mila Busby PA-C  Thoracic Surgery  [facility] Thoracic Surgery at [address], [address]  Phone: [phone]  Fax: [phone]      Addendum to PA Note:      Thoracic Surgery    2/5/2025  Patient seen on 2/5/2025    Jose Mc is a 69 y.o. male presenting with right empyema with Strep intermedius. Underwent drainage with tube thoracostomy and one dose of fibrinolytics prior to transfer. Concern for new air leak. However repeat imaging demonstrates loculated effusion, expanding lung, and parenchymal disease. Given this I would continue treating with fibrinolytics to avoid surgery at this time given the good response.     On physical exam, patient was awake, alert, and oriented. They were pleasant and in no respiratory distress. On 5L. Still complaining of dyspnea but better.  Intermittent air leak. Cloudy serous drainage from tube.   Vital signs were reviewed. Sinus tachycardia.    Labs were notable for No leukocytosis. Anemia with Hgb 7.5. INR of 1.5. Hypoalbuminemia of 1.3. Anti-XA 0.54. aPTT > 130.     Reviewed CT scan: continued RLL consolidation, loculated hydropneumothorax. Pigtail in good position.     A/P: 69 y.o. male with strep intermedius emphyema. Recommended continued drainage with fibrinolytics. Given PE and therapeutic anticoagulation, will use 1/2 dose TPA. I think can avoid surgery and continue treating. 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

## 2025-02-05 NOTE — TELEPHONE ENCOUNTER
Care Transitions Initial Follow Up Call    Outreach made within 2 business days of discharge: Yes    Patient: Jose Mc Patient : 1955   MRN: [id number]  Reason for Admission:     Discharge Date: 25       Spoke with:     Discharge department/facility: Scheduled appointment with PCP within 7-14 days    Follow Up  Future Appointments   Date Time Provider Department Center   2025 10:00 AM [facility]   2025  1:00 PM Glen Gutierrez MD [facility]       Paula Penaloza MA

## 2025-02-05 NOTE — PROCEDURES
PROCEDURE NOTE  Date: 2/5/2025   Name: Jose Mc  YOB: 1955    Procedures    Thoracic Surgery Procedure Note:    2/5/2025     Jose Mc    Pre-Procedure Diagnosis: Purulent exudative effusion     Post-Procedure Diagnosis: Purulent exudative effusion     Procedure: Administration of Fibrinolytic Therapy via Right  Pigtail    Date of Initial Administration: 2/5/2025  Day 1/1    Indication for Procedure: Jose Mc is a 69 y.o. male who presents with Right  pleural effusion. For complex loculated effusions, the recommendation is trial of fibrinolytic therapy prior to surgery. This is well-tolerated, has minimal risks, and is associated with low failure rate requiring surgery and shorter hospital stay. Thus we will proceed with administration of the lytics.     Description of Procedure: Patient was seen at the bedside. They had a prior pigtail placed. The 3 way stop clock was cleansed with rubbing alcohol. The alteplase 6 mg in 30 mL of NS was administered. Then flushed with 10 cc NS. Chest tube was raised to IV pole and will remain for 4 hours. Discussed with RN and patient recommended the patient move around if possible during the dwell time. Recommended dwell time of 4 hours. Pigtail will be placed to ground and allowed to drain into pleuravac while connected to -20 cmH2O continuous wall suction.  was readily available if needed.     Will follow AM CXR    MEHREEN Mendez [facility] Thoracic Surgery at [address], [address]  Phone: [phone]  Fax: [phone]

## 2025-02-06 ENCOUNTER — APPOINTMENT (OUTPATIENT)
Facility: HOSPITAL | Age: 70
DRG: 186 | End: 2025-02-06
Attending: INTERNAL MEDICINE
Payer: MEDICARE

## 2025-02-06 LAB
ANION GAP SERPL CALC-SCNC: 7 MMOL/L (ref 2–12)
BACTERIA SPEC CULT: ABNORMAL
BACTERIA SPEC CULT: ABNORMAL
BACTERIA SPEC CULT: NORMAL
BACTERIA SPEC CULT: NORMAL
BUN SERPL-MCNC: 6 MG/DL (ref 6–20)
BUN/CREAT SERPL: 10 (ref 12–20)
CALCIUM SERPL-MCNC: 7.6 MG/DL (ref 8.5–10.1)
CHLORIDE SERPL-SCNC: 104 MMOL/L (ref 97–108)
CO2 SERPL-SCNC: 26 MMOL/L (ref 21–32)
CREAT SERPL-MCNC: 0.58 MG/DL (ref 0.7–1.3)
ERYTHROCYTE [DISTWIDTH] IN BLOOD BY AUTOMATED COUNT: 16.2 % (ref 11.5–14.5)
GLUCOSE SERPL-MCNC: 86 MG/DL (ref 65–100)
GRAM STN SPEC: ABNORMAL
GRAM STN SPEC: ABNORMAL
HCT VFR BLD AUTO: 25.3 % (ref 36.6–50.3)
HGB BLD-MCNC: 7.5 G/DL (ref 12.1–17)
MCH RBC QN AUTO: 25.8 PG (ref 26–34)
MCHC RBC AUTO-ENTMCNC: 29.6 G/DL (ref 30–36.5)
MCV RBC AUTO: 86.9 FL (ref 80–99)
NRBC # BLD: 0 K/UL (ref 0–0.01)
NRBC BLD-RTO: 0 PER 100 WBC
PLATELET # BLD AUTO: 275 K/UL (ref 150–400)
PMV BLD AUTO: 10.3 FL (ref 8.9–12.9)
POTASSIUM SERPL-SCNC: 3.2 MMOL/L (ref 3.5–5.1)
RBC # BLD AUTO: 2.91 M/UL (ref 4.1–5.7)
SERVICE CMNT-IMP: ABNORMAL
SERVICE CMNT-IMP: NORMAL
SERVICE CMNT-IMP: NORMAL
SODIUM SERPL-SCNC: 137 MMOL/L (ref 136–145)
UFH PPP CHRO-ACNC: 0.82 IU/ML
UFH PPP CHRO-ACNC: 0.85 IU/ML
UFH PPP CHRO-ACNC: 0.89 IU/ML
WBC # BLD AUTO: 4.4 K/UL (ref 4.1–11.1)

## 2025-02-06 PROCEDURE — 36415 COLL VENOUS BLD VENIPUNCTURE: CPT

## 2025-02-06 PROCEDURE — APPSS15 APP SPLIT SHARED TIME 0-15 MINUTES

## 2025-02-06 PROCEDURE — 6370000000 HC RX 637 (ALT 250 FOR IP): Performed by: INTERNAL MEDICINE

## 2025-02-06 PROCEDURE — 99232 SBSQ HOSP IP/OBS MODERATE 35: CPT | Performed by: INTERNAL MEDICINE

## 2025-02-06 PROCEDURE — 2700000000 HC OXYGEN THERAPY PER DAY

## 2025-02-06 PROCEDURE — 6370000000 HC RX 637 (ALT 250 FOR IP): Performed by: HOSPITALIST

## 2025-02-06 PROCEDURE — 6370000000 HC RX 637 (ALT 250 FOR IP): Performed by: STUDENT IN AN ORGANIZED HEALTH CARE EDUCATION/TRAINING PROGRAM

## 2025-02-06 PROCEDURE — 97530 THERAPEUTIC ACTIVITIES: CPT

## 2025-02-06 PROCEDURE — G0545 PR INHERENT VISIT TO INPT: HCPCS | Performed by: INTERNAL MEDICINE

## 2025-02-06 PROCEDURE — 85027 COMPLETE CBC AUTOMATED: CPT

## 2025-02-06 PROCEDURE — 6360000002 HC RX W HCPCS: Performed by: HOSPITALIST

## 2025-02-06 PROCEDURE — 85520 HEPARIN ASSAY: CPT

## 2025-02-06 PROCEDURE — 6360000002 HC RX W HCPCS: Performed by: INTERNAL MEDICINE

## 2025-02-06 PROCEDURE — 2580000003 HC RX 258: Performed by: STUDENT IN AN ORGANIZED HEALTH CARE EDUCATION/TRAINING PROGRAM

## 2025-02-06 PROCEDURE — 6360000002 HC RX W HCPCS: Performed by: STUDENT IN AN ORGANIZED HEALTH CARE EDUCATION/TRAINING PROGRAM

## 2025-02-06 PROCEDURE — 71045 X-RAY EXAM CHEST 1 VIEW: CPT

## 2025-02-06 PROCEDURE — 32562 LYSE CHEST FIBRIN SUBQ DAY: CPT | Performed by: STUDENT IN AN ORGANIZED HEALTH CARE EDUCATION/TRAINING PROGRAM

## 2025-02-06 PROCEDURE — 94760 N-INVAS EAR/PLS OXIMETRY 1: CPT

## 2025-02-06 PROCEDURE — 2500000003 HC RX 250 WO HCPCS: Performed by: HOSPITALIST

## 2025-02-06 PROCEDURE — 2500000003 HC RX 250 WO HCPCS: Performed by: INTERNAL MEDICINE

## 2025-02-06 PROCEDURE — 2060000000 HC ICU INTERMEDIATE R&B

## 2025-02-06 PROCEDURE — 99232 SBSQ HOSP IP/OBS MODERATE 35: CPT | Performed by: STUDENT IN AN ORGANIZED HEALTH CARE EDUCATION/TRAINING PROGRAM

## 2025-02-06 PROCEDURE — 94640 AIRWAY INHALATION TREATMENT: CPT

## 2025-02-06 PROCEDURE — 3E03317 INTRODUCTION OF OTHER THROMBOLYTIC INTO PERIPHERAL VEIN, PERCUTANEOUS APPROACH: ICD-10-PCS

## 2025-02-06 PROCEDURE — 80048 BASIC METABOLIC PNL TOTAL CA: CPT

## 2025-02-06 PROCEDURE — 97535 SELF CARE MNGMENT TRAINING: CPT

## 2025-02-06 RX ORDER — IPRATROPIUM BROMIDE AND ALBUTEROL SULFATE 2.5; .5 MG/3ML; MG/3ML
1 SOLUTION RESPIRATORY (INHALATION)
Status: DISCONTINUED | OUTPATIENT
Start: 2025-02-06 | End: 2025-02-07

## 2025-02-06 RX ORDER — POTASSIUM CHLORIDE 750 MG/1
40 TABLET, EXTENDED RELEASE ORAL ONCE
Status: COMPLETED | OUTPATIENT
Start: 2025-02-06 | End: 2025-02-06

## 2025-02-06 RX ORDER — CASTOR OIL AND BALSAM, PERU 788; 87 MG/G; MG/G
OINTMENT TOPICAL 2 TIMES DAILY
Status: DISCONTINUED | OUTPATIENT
Start: 2025-02-06 | End: 2025-02-18 | Stop reason: HOSPADM

## 2025-02-06 RX ADMIN — POTASSIUM CHLORIDE 40 MEQ: 750 TABLET, EXTENDED RELEASE ORAL at 09:46

## 2025-02-06 RX ADMIN — METRONIDAZOLE 500 MG: 5 INJECTION, SOLUTION INTRAVENOUS at 18:59

## 2025-02-06 RX ADMIN — GUAIFENESIN 600 MG: 600 TABLET, EXTENDED RELEASE ORAL at 20:56

## 2025-02-06 RX ADMIN — METRONIDAZOLE 500 MG: 5 INJECTION, SOLUTION INTRAVENOUS at 09:43

## 2025-02-06 RX ADMIN — PANTOPRAZOLE SODIUM 40 MG: 40 TABLET, DELAYED RELEASE ORAL at 07:03

## 2025-02-06 RX ADMIN — WATER 2000 MG: 1 INJECTION INTRAMUSCULAR; INTRAVENOUS; SUBCUTANEOUS at 17:48

## 2025-02-06 RX ADMIN — IPRATROPIUM BROMIDE AND ALBUTEROL SULFATE 1 DOSE: .5; 3 SOLUTION RESPIRATORY (INHALATION) at 11:36

## 2025-02-06 RX ADMIN — Medication 5 ML: at 20:56

## 2025-02-06 RX ADMIN — SODIUM CHLORIDE, PRESERVATIVE FREE 10 ML: 5 INJECTION INTRAVENOUS at 09:43

## 2025-02-06 RX ADMIN — Medication: at 20:57

## 2025-02-06 RX ADMIN — IPRATROPIUM BROMIDE AND ALBUTEROL SULFATE 1 DOSE: .5; 3 SOLUTION RESPIRATORY (INHALATION) at 20:18

## 2025-02-06 RX ADMIN — BENZONATATE 200 MG: 100 CAPSULE ORAL at 16:10

## 2025-02-06 RX ADMIN — METRONIDAZOLE 500 MG: 5 INJECTION, SOLUTION INTRAVENOUS at 02:53

## 2025-02-06 RX ADMIN — HEPARIN SODIUM 25 UNITS/KG/HR: 10000 INJECTION, SOLUTION INTRAVENOUS at 02:55

## 2025-02-06 RX ADMIN — SODIUM CHLORIDE, PRESERVATIVE FREE 10 ML: 5 INJECTION INTRAVENOUS at 20:57

## 2025-02-06 RX ADMIN — IPRATROPIUM BROMIDE AND ALBUTEROL SULFATE 1 DOSE: .5; 3 SOLUTION RESPIRATORY (INHALATION) at 16:52

## 2025-02-06 RX ADMIN — SODIUM CHLORIDE 6 MG: 9 INJECTION INTRAMUSCULAR; INTRAVENOUS; SUBCUTANEOUS at 09:36

## 2025-02-06 RX ADMIN — GUAIFENESIN 600 MG: 600 TABLET, EXTENDED RELEASE ORAL at 09:38

## 2025-02-06 RX ADMIN — BENZONATATE 200 MG: 100 CAPSULE ORAL at 20:56

## 2025-02-06 RX ADMIN — BENZONATATE 200 MG: 100 CAPSULE ORAL at 07:03

## 2025-02-06 RX ADMIN — METOPROLOL TARTRATE 12.5 MG: 25 TABLET, FILM COATED ORAL at 09:38

## 2025-02-06 RX ADMIN — Medication: at 17:51

## 2025-02-06 RX ADMIN — METOPROLOL TARTRATE 12.5 MG: 25 TABLET, FILM COATED ORAL at 20:56

## 2025-02-06 NOTE — PROCEDURES
PROCEDURE NOTE  Date: 2/6/2025   Name: Jose Mc  YOB: 1955    Procedures      Thoracic Surgery Procedure Note:    2/6/2025     Jose Mc    Pre-Procedure Diagnosis: right empyema    Post-Procedure Diagnosis: right empyema    Procedure: Administration of Fibrinolytic Therapy via Right  Pigtail    Date of Initial Administration:  2/2/2025 at [facility]  Day 3 / 3    Indication for Procedure: Jose Mc is a 69 y.o. male who presents with Right  pleural effusion. For complex loculated effusions, the recommendation is trial of fibrinolytic therapy prior to surgery. This is well-tolerated, has minimal risks, and is associated with low failure rate requiring surgery and shorter hospital stay. Thus we will proceed with administration of the lytics.     Description of Procedure: Patient was seen at the bedside. They had a prior pigtail placed. The 3 way stop clock was cleansed with rubbing alcohol. The alteplase 6 mg in 50 mL of NS was administered first.  Given air leak chest tube was raised. It is recommended the patient move around if possible during the dwell time. Recommended dwell time of 4 hours. Return to floor and suction at -20 cmH2O at 13:00.     Will follow AM CXR    Jimbo Ruvalcaba MD  Thoracic Surgeon  [facility] Thoracic Surgery at [address], [address]  Phone: [phone]  Fax: [phone]

## 2025-02-06 NOTE — CARE COORDINATION
Care Management Initial Assessment       RUR:17%  Readmission? Yes - previous d/c  from [facility] on 1/31/25  1st IM letter given? No  1st  letter given: No    02/06/25 1742   Service Assessment   Patient Orientation Alert and Oriented   Cognition Alert   History Provided By Patient;Medical Record   Primary Caregiver Self   Support Systems Children;Friends/Neighbors   Patient's Healthcare Decision Maker is: Legal Next of Kin   PCP Verified by CM Yes   Last Visit to PCP Within last 6 months   Prior Functional Level Independent in ADLs/IADLs   Current Functional Level Assistance with the following:   Can patient return to prior living arrangement No   Family able to assist with home care needs: No   Financial Resources Medicare  ([employer])   Community Resources None   Social/Functional History   Lives With Alone   Type of Home Apartment   Home Layout One level   Home Access Stairs to enter with rails   Entrance Stairs - Number of Steps 2   Bathroom Shower/Tub Walk-in shower   Bathroom Equipment None   Home Equipment None   Prior Level of Assist for ADLs Independent   Prior Level of Assist for Homemaking Independent   Ambulation Assistance Independent   Prior Level of Assist for Transfers Independent   Active  No   Mode of Transportation Car   Occupation Retired   Discharge Planning   Type of Residence Apartment   Living Arrangements Alone   Current Services Prior To Admission None   Potential Assistance Needed N/A   DME Ordered? No   Potential Assistance Purchasing Medications No   Type of Home Care Services None   Patient expects to be discharged to: Apartment   One/Two Story Residence One story   History of falls? 0   Services At/After Discharge   Transition of Care Consult (CM Consult) Discharge Planning   Services At/After Discharge Inpatient rehab   Condition of Participation: Discharge Planning   The Plan for Transition of Care is related to the following treatment goals: Thoracic surgery

## 2025-02-06 NOTE — WOUND CARE
WOCN Note:     New consult placed for \"skin breakdown Right buttock; redness back and sacrum\"    Assessed in 441/02 with RN.    Jose Mc is a 69 y.o. y/o male who presented for Pleural effusion [J90]  Admitted on 2/4/2025; LOS: 2    Past Medical History:   Diagnosis Date    Cancer (HCC) 07/2022    prostate, radiation    Depression     Hypertension     Stroke (HCC)     tia behind his eye branch retina artery     Lab Results   Component Value Date/Time    WBC 4.4 02/06/2025 05:24 AM        Tobacco Use      Smoking status: Never      Smokeless tobacco: Never     ADULT DIET; Regular     Assessment:   Patient is alert, communicative and turns with assist.  Bed: comfort gel; will add air module  GI/: external catheter  Patient reports no pain.   Patient repositioned on right side with pillow.    Heels offloaded with pillows.   Heels intact without erythema.        Wound Assessment  POA Sacrum/buttock, blanching red erythema  TX: Applied foam dressing      Wound, Pressure Prevention & Skin Care Recommendations:    Minimize layers of linen/pads under patient to optimize support surface.    2.  Turn/reposition approximately every 2 hours and offload heels.   3.  Manage moisture/ Keep skin folds clean and dry/minimize brief usage.  4.  Specialty bed: comfort gel with air module ordered. . Use only flat sheet and one incontinence pad.  5.  Sacrum/buttocks:  venelex and foam dressing    Discussed above plan with patient & Samantha TORRES.    Transition of Care:   Plan to follow as needed while admitted to hospital.    ELAN BrandonN RN [facility] Inpatient Wound Care  Available on [employer]  Office [phone]

## 2025-02-06 NOTE — CARE COORDINATION
02/06/25 1806   Readmission Assessment   Number of Days since last admission? 1-7 days   Previous Disposition Home Alone   Who is being Interviewed Patient   What was the patient's/caregiver's perception as to why they think they needed to return back to the hospital?   (transfer from [facility] to [facility])   Did you visit your Primary Care Physician after you left the hospital, before you returned this time? No   Did you see a specialist, such as Cardiac, Pulmonary, Orthopedic Physician, etc. after you left the hospital? No   Who advised the patient to return to the hospital? Physician   Does the patient report anything that got in the way of taking their medications? No   In our efforts to provide the best possible care to you and others like you, can you think of anything that we could have done to help you after you left the hospital the first time, so that you might not have needed to return so soon? Other (Comment)     KM Dsouza, CRM  [phone]

## 2025-02-07 ENCOUNTER — APPOINTMENT (OUTPATIENT)
Facility: HOSPITAL | Age: 70
DRG: 186 | End: 2025-02-07
Attending: INTERNAL MEDICINE
Payer: MEDICARE

## 2025-02-07 LAB
ANION GAP SERPL CALC-SCNC: 3 MMOL/L (ref 2–12)
BUN SERPL-MCNC: 7 MG/DL (ref 6–20)
BUN/CREAT SERPL: 11 (ref 12–20)
CALCIUM SERPL-MCNC: 7.4 MG/DL (ref 8.5–10.1)
CHLORIDE SERPL-SCNC: 106 MMOL/L (ref 97–108)
CO2 SERPL-SCNC: 28 MMOL/L (ref 21–32)
CREAT SERPL-MCNC: 0.66 MG/DL (ref 0.7–1.3)
ERYTHROCYTE [DISTWIDTH] IN BLOOD BY AUTOMATED COUNT: 16.3 % (ref 11.5–14.5)
GLUCOSE SERPL-MCNC: 114 MG/DL (ref 65–100)
HCT VFR BLD AUTO: 24.1 % (ref 36.6–50.3)
HGB BLD-MCNC: 7.2 G/DL (ref 12.1–17)
MAGNESIUM SERPL-MCNC: 1.6 MG/DL (ref 1.6–2.4)
MCH RBC QN AUTO: 25.9 PG (ref 26–34)
MCHC RBC AUTO-ENTMCNC: 29.9 G/DL (ref 30–36.5)
MCV RBC AUTO: 86.7 FL (ref 80–99)
NRBC # BLD: 0 K/UL (ref 0–0.01)
NRBC BLD-RTO: 0 PER 100 WBC
PLATELET # BLD AUTO: 264 K/UL (ref 150–400)
PMV BLD AUTO: 10.1 FL (ref 8.9–12.9)
POTASSIUM SERPL-SCNC: 3.6 MMOL/L (ref 3.5–5.1)
RBC # BLD AUTO: 2.78 M/UL (ref 4.1–5.7)
SODIUM SERPL-SCNC: 137 MMOL/L (ref 136–145)
UFH PPP CHRO-ACNC: 0.63 IU/ML
UFH PPP CHRO-ACNC: 0.83 IU/ML
UFH PPP CHRO-ACNC: 0.86 IU/ML
UFH PPP CHRO-ACNC: 0.9 IU/ML
WBC # BLD AUTO: 4.4 K/UL (ref 4.1–11.1)

## 2025-02-07 PROCEDURE — 6370000000 HC RX 637 (ALT 250 FOR IP): Performed by: HOSPITALIST

## 2025-02-07 PROCEDURE — 85520 HEPARIN ASSAY: CPT

## 2025-02-07 PROCEDURE — 2060000000 HC ICU INTERMEDIATE R&B

## 2025-02-07 PROCEDURE — APPSS15 APP SPLIT SHARED TIME 0-15 MINUTES

## 2025-02-07 PROCEDURE — 83735 ASSAY OF MAGNESIUM: CPT

## 2025-02-07 PROCEDURE — 85027 COMPLETE CBC AUTOMATED: CPT

## 2025-02-07 PROCEDURE — 94640 AIRWAY INHALATION TREATMENT: CPT

## 2025-02-07 PROCEDURE — 71045 X-RAY EXAM CHEST 1 VIEW: CPT

## 2025-02-07 PROCEDURE — G0545 PR INHERENT VISIT TO INPT: HCPCS | Performed by: INTERNAL MEDICINE

## 2025-02-07 PROCEDURE — 2500000003 HC RX 250 WO HCPCS: Performed by: INTERNAL MEDICINE

## 2025-02-07 PROCEDURE — 36415 COLL VENOUS BLD VENIPUNCTURE: CPT

## 2025-02-07 PROCEDURE — 6360000002 HC RX W HCPCS: Performed by: INTERNAL MEDICINE

## 2025-02-07 PROCEDURE — 2500000003 HC RX 250 WO HCPCS: Performed by: HOSPITALIST

## 2025-02-07 PROCEDURE — 2700000000 HC OXYGEN THERAPY PER DAY

## 2025-02-07 PROCEDURE — 6370000000 HC RX 637 (ALT 250 FOR IP): Performed by: STUDENT IN AN ORGANIZED HEALTH CARE EDUCATION/TRAINING PROGRAM

## 2025-02-07 PROCEDURE — 6360000002 HC RX W HCPCS: Performed by: HOSPITALIST

## 2025-02-07 PROCEDURE — 80048 BASIC METABOLIC PNL TOTAL CA: CPT

## 2025-02-07 PROCEDURE — 99232 SBSQ HOSP IP/OBS MODERATE 35: CPT | Performed by: STUDENT IN AN ORGANIZED HEALTH CARE EDUCATION/TRAINING PROGRAM

## 2025-02-07 PROCEDURE — 6370000000 HC RX 637 (ALT 250 FOR IP): Performed by: INTERNAL MEDICINE

## 2025-02-07 PROCEDURE — 94760 N-INVAS EAR/PLS OXIMETRY 1: CPT

## 2025-02-07 PROCEDURE — 99233 SBSQ HOSP IP/OBS HIGH 50: CPT | Performed by: INTERNAL MEDICINE

## 2025-02-07 PROCEDURE — 2580000003 HC RX 258: Performed by: INTERNAL MEDICINE

## 2025-02-07 RX ORDER — METOPROLOL TARTRATE 25 MG/1
25 TABLET, FILM COATED ORAL 2 TIMES DAILY
Status: DISCONTINUED | OUTPATIENT
Start: 2025-02-07 | End: 2025-02-18 | Stop reason: HOSPADM

## 2025-02-07 RX ORDER — IPRATROPIUM BROMIDE AND ALBUTEROL SULFATE 2.5; .5 MG/3ML; MG/3ML
1 SOLUTION RESPIRATORY (INHALATION)
Status: DISCONTINUED | OUTPATIENT
Start: 2025-02-08 | End: 2025-02-10

## 2025-02-07 RX ORDER — METOPROLOL TARTRATE 1 MG/ML
5 INJECTION, SOLUTION INTRAVENOUS ONCE
Status: COMPLETED | OUTPATIENT
Start: 2025-02-07 | End: 2025-02-07

## 2025-02-07 RX ORDER — 0.9 % SODIUM CHLORIDE 0.9 %
500 INTRAVENOUS SOLUTION INTRAVENOUS ONCE
Status: COMPLETED | OUTPATIENT
Start: 2025-02-07 | End: 2025-02-07

## 2025-02-07 RX ORDER — MAGNESIUM SULFATE IN WATER 40 MG/ML
2000 INJECTION, SOLUTION INTRAVENOUS ONCE
Status: COMPLETED | OUTPATIENT
Start: 2025-02-07 | End: 2025-02-07

## 2025-02-07 RX ADMIN — Medication: at 09:22

## 2025-02-07 RX ADMIN — BENZONATATE 200 MG: 100 CAPSULE ORAL at 20:35

## 2025-02-07 RX ADMIN — BENZONATATE 200 MG: 100 CAPSULE ORAL at 07:14

## 2025-02-07 RX ADMIN — IPRATROPIUM BROMIDE AND ALBUTEROL SULFATE 1 DOSE: .5; 3 SOLUTION RESPIRATORY (INHALATION) at 11:50

## 2025-02-07 RX ADMIN — HEPARIN SODIUM 17 UNITS/KG/HR: 10000 INJECTION, SOLUTION INTRAVENOUS at 06:29

## 2025-02-07 RX ADMIN — METOPROLOL TARTRATE 5 MG: 1 INJECTION, SOLUTION INTRAVENOUS at 10:13

## 2025-02-07 RX ADMIN — METRONIDAZOLE 500 MG: 5 INJECTION, SOLUTION INTRAVENOUS at 01:54

## 2025-02-07 RX ADMIN — MAGNESIUM SULFATE HEPTAHYDRATE 2000 MG: 40 INJECTION, SOLUTION INTRAVENOUS at 09:21

## 2025-02-07 RX ADMIN — PANTOPRAZOLE SODIUM 40 MG: 40 TABLET, DELAYED RELEASE ORAL at 06:29

## 2025-02-07 RX ADMIN — IPRATROPIUM BROMIDE AND ALBUTEROL SULFATE 1 DOSE: .5; 3 SOLUTION RESPIRATORY (INHALATION) at 08:36

## 2025-02-07 RX ADMIN — Medication 5 ML: at 20:35

## 2025-02-07 RX ADMIN — METRONIDAZOLE 500 MG: 5 INJECTION, SOLUTION INTRAVENOUS at 12:17

## 2025-02-07 RX ADMIN — METRONIDAZOLE 500 MG: 5 INJECTION, SOLUTION INTRAVENOUS at 19:41

## 2025-02-07 RX ADMIN — SODIUM CHLORIDE 500 ML: 9 INJECTION, SOLUTION INTRAVENOUS at 10:17

## 2025-02-07 RX ADMIN — GUAIFENESIN 600 MG: 600 TABLET, EXTENDED RELEASE ORAL at 20:35

## 2025-02-07 RX ADMIN — IPRATROPIUM BROMIDE AND ALBUTEROL SULFATE 1 DOSE: .5; 3 SOLUTION RESPIRATORY (INHALATION) at 15:11

## 2025-02-07 RX ADMIN — WATER 2000 MG: 1 INJECTION INTRAMUSCULAR; INTRAVENOUS; SUBCUTANEOUS at 16:23

## 2025-02-07 RX ADMIN — SODIUM CHLORIDE, PRESERVATIVE FREE 10 ML: 5 INJECTION INTRAVENOUS at 09:14

## 2025-02-07 RX ADMIN — BENZONATATE 200 MG: 100 CAPSULE ORAL at 16:24

## 2025-02-07 RX ADMIN — GUAIFENESIN 600 MG: 600 TABLET, EXTENDED RELEASE ORAL at 09:12

## 2025-02-07 RX ADMIN — METOPROLOL TARTRATE 25 MG: 25 TABLET, FILM COATED ORAL at 09:12

## 2025-02-08 ENCOUNTER — APPOINTMENT (OUTPATIENT)
Facility: HOSPITAL | Age: 70
DRG: 186 | End: 2025-02-08
Attending: INTERNAL MEDICINE
Payer: MEDICARE

## 2025-02-08 LAB
ANION GAP SERPL CALC-SCNC: 6 MMOL/L (ref 2–12)
BUN SERPL-MCNC: 6 MG/DL (ref 6–20)
BUN/CREAT SERPL: 8 (ref 12–20)
CALCIUM SERPL-MCNC: 7.6 MG/DL (ref 8.5–10.1)
CHLORIDE SERPL-SCNC: 106 MMOL/L (ref 97–108)
CO2 SERPL-SCNC: 24 MMOL/L (ref 21–32)
CREAT SERPL-MCNC: 0.73 MG/DL (ref 0.7–1.3)
ERYTHROCYTE [DISTWIDTH] IN BLOOD BY AUTOMATED COUNT: 16.3 % (ref 11.5–14.5)
GLUCOSE BLD STRIP.AUTO-MCNC: 102 MG/DL (ref 65–117)
GLUCOSE SERPL-MCNC: 112 MG/DL (ref 65–100)
HCT VFR BLD AUTO: 23.1 % (ref 36.6–50.3)
HCT VFR BLD AUTO: 26.4 % (ref 36.6–50.3)
HGB BLD-MCNC: 6.8 G/DL (ref 12.1–17)
HGB BLD-MCNC: 8.3 G/DL (ref 12.1–17)
HISTORY CHECK: NORMAL
MAGNESIUM SERPL-MCNC: 1.8 MG/DL (ref 1.6–2.4)
MCH RBC QN AUTO: 25.8 PG (ref 26–34)
MCHC RBC AUTO-ENTMCNC: 29.4 G/DL (ref 30–36.5)
MCV RBC AUTO: 87.5 FL (ref 80–99)
NRBC # BLD: 0 K/UL (ref 0–0.01)
NRBC BLD-RTO: 0 PER 100 WBC
PLATELET # BLD AUTO: 266 K/UL (ref 150–400)
PMV BLD AUTO: 10 FL (ref 8.9–12.9)
POTASSIUM SERPL-SCNC: 3.8 MMOL/L (ref 3.5–5.1)
RBC # BLD AUTO: 2.64 M/UL (ref 4.1–5.7)
SERVICE CMNT-IMP: NORMAL
SODIUM SERPL-SCNC: 136 MMOL/L (ref 136–145)
UFH PPP CHRO-ACNC: 0.51 IU/ML
UFH PPP CHRO-ACNC: 0.62 IU/ML
WBC # BLD AUTO: 5.2 K/UL (ref 4.1–11.1)

## 2025-02-08 PROCEDURE — 99232 SBSQ HOSP IP/OBS MODERATE 35: CPT | Performed by: INTERNAL MEDICINE

## 2025-02-08 PROCEDURE — 94640 AIRWAY INHALATION TREATMENT: CPT

## 2025-02-08 PROCEDURE — 83735 ASSAY OF MAGNESIUM: CPT

## 2025-02-08 PROCEDURE — 99232 SBSQ HOSP IP/OBS MODERATE 35: CPT | Performed by: STUDENT IN AN ORGANIZED HEALTH CARE EDUCATION/TRAINING PROGRAM

## 2025-02-08 PROCEDURE — 85520 HEPARIN ASSAY: CPT

## 2025-02-08 PROCEDURE — 71045 X-RAY EXAM CHEST 1 VIEW: CPT

## 2025-02-08 PROCEDURE — 82962 GLUCOSE BLOOD TEST: CPT

## 2025-02-08 PROCEDURE — 6370000000 HC RX 637 (ALT 250 FOR IP): Performed by: INTERNAL MEDICINE

## 2025-02-08 PROCEDURE — 86923 COMPATIBILITY TEST ELECTRIC: CPT

## 2025-02-08 PROCEDURE — 86901 BLOOD TYPING SEROLOGIC RH(D): CPT

## 2025-02-08 PROCEDURE — 2500000003 HC RX 250 WO HCPCS: Performed by: INTERNAL MEDICINE

## 2025-02-08 PROCEDURE — P9016 RBC LEUKOCYTES REDUCED: HCPCS

## 2025-02-08 PROCEDURE — 80048 BASIC METABOLIC PNL TOTAL CA: CPT

## 2025-02-08 PROCEDURE — 86900 BLOOD TYPING SEROLOGIC ABO: CPT

## 2025-02-08 PROCEDURE — 36430 TRANSFUSION BLD/BLD COMPNT: CPT

## 2025-02-08 PROCEDURE — 6370000000 HC RX 637 (ALT 250 FOR IP): Performed by: HOSPITALIST

## 2025-02-08 PROCEDURE — 2060000000 HC ICU INTERMEDIATE R&B

## 2025-02-08 PROCEDURE — G0545 PR INHERENT VISIT TO INPT: HCPCS | Performed by: INTERNAL MEDICINE

## 2025-02-08 PROCEDURE — 6360000002 HC RX W HCPCS: Performed by: INTERNAL MEDICINE

## 2025-02-08 PROCEDURE — 6360000002 HC RX W HCPCS: Performed by: HOSPITALIST

## 2025-02-08 PROCEDURE — 85014 HEMATOCRIT: CPT

## 2025-02-08 PROCEDURE — 85018 HEMOGLOBIN: CPT

## 2025-02-08 PROCEDURE — 86850 RBC ANTIBODY SCREEN: CPT

## 2025-02-08 PROCEDURE — 85027 COMPLETE CBC AUTOMATED: CPT

## 2025-02-08 PROCEDURE — 36415 COLL VENOUS BLD VENIPUNCTURE: CPT

## 2025-02-08 PROCEDURE — 2500000003 HC RX 250 WO HCPCS: Performed by: HOSPITALIST

## 2025-02-08 RX ORDER — SODIUM CHLORIDE 9 MG/ML
INJECTION, SOLUTION INTRAVENOUS PRN
Status: DISCONTINUED | OUTPATIENT
Start: 2025-02-08 | End: 2025-02-17

## 2025-02-08 RX ADMIN — GUAIFENESIN 600 MG: 600 TABLET, EXTENDED RELEASE ORAL at 20:53

## 2025-02-08 RX ADMIN — METRONIDAZOLE 500 MG: 5 INJECTION, SOLUTION INTRAVENOUS at 12:19

## 2025-02-08 RX ADMIN — GUAIFENESIN 600 MG: 600 TABLET, EXTENDED RELEASE ORAL at 09:10

## 2025-02-08 RX ADMIN — WATER 2000 MG: 1 INJECTION INTRAMUSCULAR; INTRAVENOUS; SUBCUTANEOUS at 16:56

## 2025-02-08 RX ADMIN — Medication: at 20:58

## 2025-02-08 RX ADMIN — IPRATROPIUM BROMIDE AND ALBUTEROL SULFATE 1 DOSE: .5; 3 SOLUTION RESPIRATORY (INHALATION) at 13:14

## 2025-02-08 RX ADMIN — BENZONATATE 200 MG: 100 CAPSULE ORAL at 16:16

## 2025-02-08 RX ADMIN — METRONIDAZOLE 500 MG: 5 INJECTION, SOLUTION INTRAVENOUS at 18:37

## 2025-02-08 RX ADMIN — SODIUM CHLORIDE, PRESERVATIVE FREE 10 ML: 5 INJECTION INTRAVENOUS at 20:58

## 2025-02-08 RX ADMIN — METOPROLOL TARTRATE 25 MG: 25 TABLET, FILM COATED ORAL at 20:54

## 2025-02-08 RX ADMIN — Medication 5 ML: at 20:54

## 2025-02-08 RX ADMIN — HEPARIN SODIUM 15 UNITS/KG/HR: 10000 INJECTION, SOLUTION INTRAVENOUS at 02:34

## 2025-02-08 RX ADMIN — Medication: at 09:20

## 2025-02-08 RX ADMIN — BENZONATATE 200 MG: 100 CAPSULE ORAL at 09:15

## 2025-02-08 RX ADMIN — HEPARIN SODIUM 15 UNITS/KG/HR: 10000 INJECTION, SOLUTION INTRAVENOUS at 21:44

## 2025-02-08 RX ADMIN — PANTOPRAZOLE SODIUM 40 MG: 40 TABLET, DELAYED RELEASE ORAL at 09:25

## 2025-02-08 RX ADMIN — METOPROLOL TARTRATE 25 MG: 25 TABLET, FILM COATED ORAL at 09:11

## 2025-02-08 RX ADMIN — BENZONATATE 200 MG: 100 CAPSULE ORAL at 20:54

## 2025-02-08 RX ADMIN — METRONIDAZOLE 500 MG: 5 INJECTION, SOLUTION INTRAVENOUS at 02:35

## 2025-02-08 RX ADMIN — SODIUM CHLORIDE, PRESERVATIVE FREE 10 ML: 5 INJECTION INTRAVENOUS at 09:20

## 2025-02-08 RX ADMIN — IPRATROPIUM BROMIDE AND ALBUTEROL SULFATE 1 DOSE: .5; 3 SOLUTION RESPIRATORY (INHALATION) at 07:22

## 2025-02-08 NOTE — CONSENT
Informed Consent for Blood Component Transfusion Note    I have discussed with the patient the rationale for blood component transfusion; its benefits in treating or preventing fatigue, organ damage, or death; and its risk which includes mild transfusion reactions, rare risk of blood borne infection, or more serious but rare reactions. I have discussed the alternatives to transfusion, including the risk and consequences of not receiving transfusion. The patient had an opportunity to ask questions and had agreed to proceed with transfusion of blood components.    Electronically signed by STEFFANIE Etienne NP on 2/8/25 at 1:19 AM EST

## 2025-02-09 ENCOUNTER — APPOINTMENT (OUTPATIENT)
Facility: HOSPITAL | Age: 70
DRG: 186 | End: 2025-02-09
Attending: INTERNAL MEDICINE
Payer: MEDICARE

## 2025-02-09 LAB
ABO + RH BLD: NORMAL
BACTERIA ISLT: ABNORMAL
BACTERIA ISLT: ABNORMAL
BLD PROD TYP BPU: NORMAL
BLOOD BANK BLOOD PRODUCT EXPIRATION DATE: NORMAL
BLOOD BANK DISPENSE STATUS: NORMAL
BLOOD BANK ISBT PRODUCT BLOOD TYPE: 6200
BLOOD BANK PRODUCT CODE: NORMAL
BLOOD BANK UNIT TYPE AND RH: NORMAL
BLOOD GROUP ANTIBODIES SERPL: NORMAL
BPU ID: NORMAL
CROSSMATCH RESULT: NORMAL
ERYTHROCYTE [DISTWIDTH] IN BLOOD BY AUTOMATED COUNT: 16 % (ref 11.5–14.5)
HCT VFR BLD AUTO: 25.7 % (ref 36.6–50.3)
HGB BLD-MCNC: 7.8 G/DL (ref 12.1–17)
MCH RBC QN AUTO: 26.3 PG (ref 26–34)
MCHC RBC AUTO-ENTMCNC: 30.4 G/DL (ref 30–36.5)
MCV RBC AUTO: 86.5 FL (ref 80–99)
NRBC # BLD: 0 K/UL (ref 0–0.01)
NRBC BLD-RTO: 0 PER 100 WBC
OTHER ANTIBIOTIC SUSC ISLT: ABNORMAL
PLATELET # BLD AUTO: 253 K/UL (ref 150–400)
PMV BLD AUTO: 10.7 FL (ref 8.9–12.9)
RBC # BLD AUTO: 2.97 M/UL (ref 4.1–5.7)
SOURCE: ABNORMAL
SOURCE: ABNORMAL
SPECIMEN EXP DATE BLD: NORMAL
SPECIMEN SOURCE: ABNORMAL
SPECIMEN SOURCE: ABNORMAL
UFH PPP CHRO-ACNC: 0.43 IU/ML
UNIT DIVISION: 0
UNIT ISSUE DATE/TIME: NORMAL
WBC # BLD AUTO: 5.9 K/UL (ref 4.1–11.1)

## 2025-02-09 PROCEDURE — 2500000003 HC RX 250 WO HCPCS: Performed by: INTERNAL MEDICINE

## 2025-02-09 PROCEDURE — 2700000000 HC OXYGEN THERAPY PER DAY

## 2025-02-09 PROCEDURE — 6360000002 HC RX W HCPCS: Performed by: INTERNAL MEDICINE

## 2025-02-09 PROCEDURE — 94640 AIRWAY INHALATION TREATMENT: CPT

## 2025-02-09 PROCEDURE — 36415 COLL VENOUS BLD VENIPUNCTURE: CPT

## 2025-02-09 PROCEDURE — 85520 HEPARIN ASSAY: CPT

## 2025-02-09 PROCEDURE — 2500000003 HC RX 250 WO HCPCS: Performed by: HOSPITALIST

## 2025-02-09 PROCEDURE — 2060000000 HC ICU INTERMEDIATE R&B

## 2025-02-09 PROCEDURE — 85027 COMPLETE CBC AUTOMATED: CPT

## 2025-02-09 PROCEDURE — 6360000002 HC RX W HCPCS: Performed by: HOSPITALIST

## 2025-02-09 PROCEDURE — 71045 X-RAY EXAM CHEST 1 VIEW: CPT

## 2025-02-09 PROCEDURE — 6370000000 HC RX 637 (ALT 250 FOR IP): Performed by: INTERNAL MEDICINE

## 2025-02-09 PROCEDURE — 99232 SBSQ HOSP IP/OBS MODERATE 35: CPT | Performed by: STUDENT IN AN ORGANIZED HEALTH CARE EDUCATION/TRAINING PROGRAM

## 2025-02-09 PROCEDURE — 6370000000 HC RX 637 (ALT 250 FOR IP): Performed by: HOSPITALIST

## 2025-02-09 RX ADMIN — IPRATROPIUM BROMIDE AND ALBUTEROL SULFATE 1 DOSE: .5; 3 SOLUTION RESPIRATORY (INHALATION) at 12:41

## 2025-02-09 RX ADMIN — IPRATROPIUM BROMIDE AND ALBUTEROL SULFATE 1 DOSE: .5; 3 SOLUTION RESPIRATORY (INHALATION) at 08:22

## 2025-02-09 RX ADMIN — Medication: at 08:22

## 2025-02-09 RX ADMIN — GUAIFENESIN 600 MG: 600 TABLET, EXTENDED RELEASE ORAL at 08:21

## 2025-02-09 RX ADMIN — METOPROLOL TARTRATE 25 MG: 25 TABLET, FILM COATED ORAL at 20:30

## 2025-02-09 RX ADMIN — GUAIFENESIN 600 MG: 600 TABLET, EXTENDED RELEASE ORAL at 20:30

## 2025-02-09 RX ADMIN — IPRATROPIUM BROMIDE AND ALBUTEROL SULFATE 1 DOSE: .5; 3 SOLUTION RESPIRATORY (INHALATION) at 22:12

## 2025-02-09 RX ADMIN — BENZONATATE 200 MG: 100 CAPSULE ORAL at 20:30

## 2025-02-09 RX ADMIN — Medication 5 ML: at 20:30

## 2025-02-09 RX ADMIN — BENZONATATE 200 MG: 100 CAPSULE ORAL at 08:21

## 2025-02-09 RX ADMIN — METRONIDAZOLE 500 MG: 5 INJECTION, SOLUTION INTRAVENOUS at 03:49

## 2025-02-09 RX ADMIN — METRONIDAZOLE 500 MG: 5 INJECTION, SOLUTION INTRAVENOUS at 18:43

## 2025-02-09 RX ADMIN — PANTOPRAZOLE SODIUM 40 MG: 40 TABLET, DELAYED RELEASE ORAL at 07:11

## 2025-02-09 RX ADMIN — METOPROLOL TARTRATE 25 MG: 25 TABLET, FILM COATED ORAL at 08:21

## 2025-02-09 RX ADMIN — SODIUM CHLORIDE, PRESERVATIVE FREE 10 ML: 5 INJECTION INTRAVENOUS at 20:32

## 2025-02-09 RX ADMIN — SODIUM CHLORIDE, PRESERVATIVE FREE 10 ML: 5 INJECTION INTRAVENOUS at 08:21

## 2025-02-09 RX ADMIN — BENZONATATE 200 MG: 100 CAPSULE ORAL at 15:30

## 2025-02-09 RX ADMIN — Medication: at 20:32

## 2025-02-09 RX ADMIN — WATER 2000 MG: 1 INJECTION INTRAMUSCULAR; INTRAVENOUS; SUBCUTANEOUS at 17:29

## 2025-02-09 RX ADMIN — HEPARIN SODIUM 15 UNITS/KG/HR: 10000 INJECTION, SOLUTION INTRAVENOUS at 16:48

## 2025-02-09 RX ADMIN — METRONIDAZOLE 500 MG: 5 INJECTION, SOLUTION INTRAVENOUS at 11:24

## 2025-02-10 ENCOUNTER — APPOINTMENT (OUTPATIENT)
Facility: HOSPITAL | Age: 70
DRG: 186 | End: 2025-02-10
Attending: INTERNAL MEDICINE
Payer: MEDICARE

## 2025-02-10 LAB
ANION GAP SERPL CALC-SCNC: 6 MMOL/L (ref 2–12)
BACTERIA SPEC CULT: NORMAL
BUN SERPL-MCNC: 6 MG/DL (ref 6–20)
BUN/CREAT SERPL: 11 (ref 12–20)
CALCIUM SERPL-MCNC: 7.3 MG/DL (ref 8.5–10.1)
CHLORIDE SERPL-SCNC: 107 MMOL/L (ref 97–108)
CO2 SERPL-SCNC: 27 MMOL/L (ref 21–32)
CREAT SERPL-MCNC: 0.54 MG/DL (ref 0.7–1.3)
ERYTHROCYTE [DISTWIDTH] IN BLOOD BY AUTOMATED COUNT: 16.4 % (ref 11.5–14.5)
GLUCOSE SERPL-MCNC: 91 MG/DL (ref 65–100)
HCT VFR BLD AUTO: 24.2 % (ref 36.6–50.3)
HGB BLD-MCNC: 7.4 G/DL (ref 12.1–17)
MCH RBC QN AUTO: 27 PG (ref 26–34)
MCHC RBC AUTO-ENTMCNC: 30.6 G/DL (ref 30–36.5)
MCV RBC AUTO: 88.3 FL (ref 80–99)
NRBC # BLD: 0 K/UL (ref 0–0.01)
NRBC BLD-RTO: 0 PER 100 WBC
PHOSPHATE SERPL-MCNC: 3 MG/DL (ref 2.6–4.7)
PLATELET # BLD AUTO: 248 K/UL (ref 150–400)
PMV BLD AUTO: 10.6 FL (ref 8.9–12.9)
POTASSIUM SERPL-SCNC: 3.8 MMOL/L (ref 3.5–5.1)
RBC # BLD AUTO: 2.74 M/UL (ref 4.1–5.7)
SERVICE CMNT-IMP: NORMAL
SODIUM SERPL-SCNC: 140 MMOL/L (ref 136–145)
UFH PPP CHRO-ACNC: 0.52 IU/ML
WBC # BLD AUTO: 6.6 K/UL (ref 4.1–11.1)

## 2025-02-10 PROCEDURE — 84100 ASSAY OF PHOSPHORUS: CPT

## 2025-02-10 PROCEDURE — 6370000000 HC RX 637 (ALT 250 FOR IP): Performed by: INTERNAL MEDICINE

## 2025-02-10 PROCEDURE — 99232 SBSQ HOSP IP/OBS MODERATE 35: CPT

## 2025-02-10 PROCEDURE — 2500000003 HC RX 250 WO HCPCS: Performed by: INTERNAL MEDICINE

## 2025-02-10 PROCEDURE — 71250 CT THORAX DX C-: CPT

## 2025-02-10 PROCEDURE — 94760 N-INVAS EAR/PLS OXIMETRY 1: CPT

## 2025-02-10 PROCEDURE — 2700000000 HC OXYGEN THERAPY PER DAY

## 2025-02-10 PROCEDURE — 2500000003 HC RX 250 WO HCPCS: Performed by: HOSPITALIST

## 2025-02-10 PROCEDURE — 80048 BASIC METABOLIC PNL TOTAL CA: CPT

## 2025-02-10 PROCEDURE — 6370000000 HC RX 637 (ALT 250 FOR IP): Performed by: HOSPITALIST

## 2025-02-10 PROCEDURE — 6360000002 HC RX W HCPCS: Performed by: HOSPITALIST

## 2025-02-10 PROCEDURE — 85520 HEPARIN ASSAY: CPT

## 2025-02-10 PROCEDURE — 85027 COMPLETE CBC AUTOMATED: CPT

## 2025-02-10 PROCEDURE — 6360000002 HC RX W HCPCS: Performed by: INTERNAL MEDICINE

## 2025-02-10 PROCEDURE — 2060000000 HC ICU INTERMEDIATE R&B

## 2025-02-10 RX ORDER — IPRATROPIUM BROMIDE AND ALBUTEROL SULFATE 2.5; .5 MG/3ML; MG/3ML
1 SOLUTION RESPIRATORY (INHALATION) EVERY 4 HOURS PRN
Status: DISCONTINUED | OUTPATIENT
Start: 2025-02-10 | End: 2025-02-18 | Stop reason: HOSPADM

## 2025-02-10 RX ORDER — BENZONATATE 100 MG/1
100 CAPSULE ORAL PRN
Status: DISCONTINUED | OUTPATIENT
Start: 2025-02-10 | End: 2025-02-18 | Stop reason: HOSPADM

## 2025-02-10 RX ADMIN — Medication: at 09:21

## 2025-02-10 RX ADMIN — WATER 2000 MG: 1 INJECTION INTRAMUSCULAR; INTRAVENOUS; SUBCUTANEOUS at 18:05

## 2025-02-10 RX ADMIN — BENZONATATE 200 MG: 100 CAPSULE ORAL at 20:30

## 2025-02-10 RX ADMIN — GUAIFENESIN 600 MG: 600 TABLET, EXTENDED RELEASE ORAL at 09:20

## 2025-02-10 RX ADMIN — METRONIDAZOLE 500 MG: 5 INJECTION, SOLUTION INTRAVENOUS at 18:11

## 2025-02-10 RX ADMIN — BENZONATATE 200 MG: 100 CAPSULE ORAL at 09:20

## 2025-02-10 RX ADMIN — PANTOPRAZOLE SODIUM 40 MG: 40 TABLET, DELAYED RELEASE ORAL at 06:40

## 2025-02-10 RX ADMIN — METRONIDAZOLE 500 MG: 5 INJECTION, SOLUTION INTRAVENOUS at 11:22

## 2025-02-10 RX ADMIN — BENZONATATE 200 MG: 100 CAPSULE ORAL at 13:56

## 2025-02-10 RX ADMIN — METOPROLOL TARTRATE 25 MG: 25 TABLET, FILM COATED ORAL at 20:30

## 2025-02-10 RX ADMIN — Medication 5 ML: at 20:31

## 2025-02-10 RX ADMIN — SODIUM CHLORIDE, PRESERVATIVE FREE 10 ML: 5 INJECTION INTRAVENOUS at 20:31

## 2025-02-10 RX ADMIN — SODIUM CHLORIDE, PRESERVATIVE FREE 10 ML: 5 INJECTION INTRAVENOUS at 09:20

## 2025-02-10 RX ADMIN — HEPARIN SODIUM 15 UNITS/KG/HR: 10000 INJECTION, SOLUTION INTRAVENOUS at 11:23

## 2025-02-10 RX ADMIN — METOPROLOL TARTRATE 25 MG: 25 TABLET, FILM COATED ORAL at 09:20

## 2025-02-10 RX ADMIN — Medication: at 20:33

## 2025-02-10 RX ADMIN — GUAIFENESIN 600 MG: 600 TABLET, EXTENDED RELEASE ORAL at 20:30

## 2025-02-10 RX ADMIN — METRONIDAZOLE 500 MG: 5 INJECTION, SOLUTION INTRAVENOUS at 03:28

## 2025-02-11 ENCOUNTER — APPOINTMENT (OUTPATIENT)
Facility: HOSPITAL | Age: 70
DRG: 186 | End: 2025-02-11
Attending: INTERNAL MEDICINE
Payer: MEDICARE

## 2025-02-11 PROBLEM — A49.1 STREPTOCOCCUS INFECTION: Status: ACTIVE | Noted: 2025-02-11

## 2025-02-11 LAB
ERYTHROCYTE [DISTWIDTH] IN BLOOD BY AUTOMATED COUNT: 16.9 % (ref 11.5–14.5)
HCT VFR BLD AUTO: 27.1 % (ref 36.6–50.3)
HGB BLD-MCNC: 8.3 G/DL (ref 12.1–17)
MCH RBC QN AUTO: 26.6 PG (ref 26–34)
MCHC RBC AUTO-ENTMCNC: 30.6 G/DL (ref 30–36.5)
MCV RBC AUTO: 86.9 FL (ref 80–99)
NRBC # BLD: 0 K/UL (ref 0–0.01)
NRBC BLD-RTO: 0 PER 100 WBC
PLATELET # BLD AUTO: 267 K/UL (ref 150–400)
PMV BLD AUTO: 10.2 FL (ref 8.9–12.9)
RBC # BLD AUTO: 3.12 M/UL (ref 4.1–5.7)
UFH PPP CHRO-ACNC: 0.44 IU/ML
WBC # BLD AUTO: 6.7 K/UL (ref 4.1–11.1)

## 2025-02-11 PROCEDURE — 2500000003 HC RX 250 WO HCPCS

## 2025-02-11 PROCEDURE — 97535 SELF CARE MNGMENT TRAINING: CPT

## 2025-02-11 PROCEDURE — 6370000000 HC RX 637 (ALT 250 FOR IP): Performed by: INTERNAL MEDICINE

## 2025-02-11 PROCEDURE — 99232 SBSQ HOSP IP/OBS MODERATE 35: CPT

## 2025-02-11 PROCEDURE — 2700000000 HC OXYGEN THERAPY PER DAY

## 2025-02-11 PROCEDURE — APPSS30 APP SPLIT SHARED TIME 16-30 MINUTES

## 2025-02-11 PROCEDURE — 85027 COMPLETE CBC AUTOMATED: CPT

## 2025-02-11 PROCEDURE — 85520 HEPARIN ASSAY: CPT

## 2025-02-11 PROCEDURE — 6360000002 HC RX W HCPCS

## 2025-02-11 PROCEDURE — 97530 THERAPEUTIC ACTIVITIES: CPT

## 2025-02-11 PROCEDURE — 2500000003 HC RX 250 WO HCPCS: Performed by: HOSPITALIST

## 2025-02-11 PROCEDURE — 6360000002 HC RX W HCPCS: Performed by: HOSPITALIST

## 2025-02-11 PROCEDURE — 94760 N-INVAS EAR/PLS OXIMETRY 1: CPT

## 2025-02-11 PROCEDURE — 2060000000 HC ICU INTERMEDIATE R&B

## 2025-02-11 PROCEDURE — 6370000000 HC RX 637 (ALT 250 FOR IP): Performed by: HOSPITALIST

## 2025-02-11 PROCEDURE — 71045 X-RAY EXAM CHEST 1 VIEW: CPT

## 2025-02-11 RX ADMIN — SODIUM CHLORIDE, PRESERVATIVE FREE 10 ML: 5 INJECTION INTRAVENOUS at 22:02

## 2025-02-11 RX ADMIN — METRONIDAZOLE 500 MG: 5 INJECTION, SOLUTION INTRAVENOUS at 18:09

## 2025-02-11 RX ADMIN — HEPARIN SODIUM 15 UNITS/KG/HR: 10000 INJECTION, SOLUTION INTRAVENOUS at 06:37

## 2025-02-11 RX ADMIN — METRONIDAZOLE 500 MG: 5 INJECTION, SOLUTION INTRAVENOUS at 12:02

## 2025-02-11 RX ADMIN — PANTOPRAZOLE SODIUM 40 MG: 40 TABLET, DELAYED RELEASE ORAL at 06:35

## 2025-02-11 RX ADMIN — WATER 2000 MG: 1 INJECTION INTRAMUSCULAR; INTRAVENOUS; SUBCUTANEOUS at 18:06

## 2025-02-11 RX ADMIN — BENZONATATE 200 MG: 100 CAPSULE ORAL at 13:26

## 2025-02-11 RX ADMIN — METRONIDAZOLE 500 MG: 5 INJECTION, SOLUTION INTRAVENOUS at 03:30

## 2025-02-11 RX ADMIN — METOPROLOL TARTRATE 25 MG: 25 TABLET, FILM COATED ORAL at 22:01

## 2025-02-11 RX ADMIN — GUAIFENESIN 600 MG: 600 TABLET, EXTENDED RELEASE ORAL at 22:02

## 2025-02-11 RX ADMIN — METOPROLOL TARTRATE 25 MG: 25 TABLET, FILM COATED ORAL at 08:50

## 2025-02-11 RX ADMIN — GUAIFENESIN 600 MG: 600 TABLET, EXTENDED RELEASE ORAL at 08:50

## 2025-02-11 RX ADMIN — Medication 5 ML: at 22:02

## 2025-02-11 RX ADMIN — BENZONATATE 100 MG: 100 CAPSULE ORAL at 18:07

## 2025-02-11 RX ADMIN — Medication: at 08:50

## 2025-02-11 RX ADMIN — Medication: at 22:02

## 2025-02-11 RX ADMIN — BENZONATATE 200 MG: 100 CAPSULE ORAL at 08:49

## 2025-02-11 RX ADMIN — SODIUM CHLORIDE, PRESERVATIVE FREE 10 ML: 5 INJECTION INTRAVENOUS at 08:50

## 2025-02-12 ENCOUNTER — APPOINTMENT (OUTPATIENT)
Facility: HOSPITAL | Age: 70
DRG: 186 | End: 2025-02-12
Attending: INTERNAL MEDICINE
Payer: MEDICARE

## 2025-02-12 PROBLEM — E44.0 MODERATE PROTEIN-ENERGY MALNUTRITION: Status: ACTIVE | Noted: 2025-02-12

## 2025-02-12 LAB
ANION GAP SERPL CALC-SCNC: 6 MMOL/L (ref 2–12)
BUN SERPL-MCNC: 4 MG/DL (ref 6–20)
BUN/CREAT SERPL: 8 (ref 12–20)
CALCIUM SERPL-MCNC: 7.6 MG/DL (ref 8.5–10.1)
CHLORIDE SERPL-SCNC: 107 MMOL/L (ref 97–108)
CO2 SERPL-SCNC: 27 MMOL/L (ref 21–32)
CREAT SERPL-MCNC: 0.53 MG/DL (ref 0.7–1.3)
ERYTHROCYTE [DISTWIDTH] IN BLOOD BY AUTOMATED COUNT: 17.4 % (ref 11.5–14.5)
GLUCOSE SERPL-MCNC: 88 MG/DL (ref 65–100)
HCT VFR BLD AUTO: 26.6 % (ref 36.6–50.3)
HGB BLD-MCNC: 8.1 G/DL (ref 12.1–17)
MCH RBC QN AUTO: 27 PG (ref 26–34)
MCHC RBC AUTO-ENTMCNC: 30.5 G/DL (ref 30–36.5)
MCV RBC AUTO: 88.7 FL (ref 80–99)
NRBC # BLD: 0 K/UL (ref 0–0.01)
NRBC BLD-RTO: 0 PER 100 WBC
PLATELET # BLD AUTO: 317 K/UL (ref 150–400)
PMV BLD AUTO: 10.8 FL (ref 8.9–12.9)
POTASSIUM SERPL-SCNC: 3.4 MMOL/L (ref 3.5–5.1)
RBC # BLD AUTO: 3 M/UL (ref 4.1–5.7)
SODIUM SERPL-SCNC: 140 MMOL/L (ref 136–145)
UFH PPP CHRO-ACNC: 0.38 IU/ML
WBC # BLD AUTO: 6.3 K/UL (ref 4.1–11.1)

## 2025-02-12 PROCEDURE — 6360000002 HC RX W HCPCS

## 2025-02-12 PROCEDURE — APPSS30 APP SPLIT SHARED TIME 16-30 MINUTES

## 2025-02-12 PROCEDURE — 85027 COMPLETE CBC AUTOMATED: CPT

## 2025-02-12 PROCEDURE — 6360000002 HC RX W HCPCS: Performed by: HOSPITALIST

## 2025-02-12 PROCEDURE — 2500000003 HC RX 250 WO HCPCS

## 2025-02-12 PROCEDURE — 1100000000 HC RM PRIVATE

## 2025-02-12 PROCEDURE — 99231 SBSQ HOSP IP/OBS SF/LOW 25: CPT

## 2025-02-12 PROCEDURE — 97535 SELF CARE MNGMENT TRAINING: CPT

## 2025-02-12 PROCEDURE — APPSS15 APP SPLIT SHARED TIME 0-15 MINUTES

## 2025-02-12 PROCEDURE — G0545 PR INHERENT VISIT TO INPT: HCPCS | Performed by: INTERNAL MEDICINE

## 2025-02-12 PROCEDURE — 80048 BASIC METABOLIC PNL TOTAL CA: CPT

## 2025-02-12 PROCEDURE — 6370000000 HC RX 637 (ALT 250 FOR IP): Performed by: INTERNAL MEDICINE

## 2025-02-12 PROCEDURE — 99232 SBSQ HOSP IP/OBS MODERATE 35: CPT | Performed by: INTERNAL MEDICINE

## 2025-02-12 PROCEDURE — 97530 THERAPEUTIC ACTIVITIES: CPT

## 2025-02-12 PROCEDURE — 6370000000 HC RX 637 (ALT 250 FOR IP): Performed by: HOSPITALIST

## 2025-02-12 PROCEDURE — 71045 X-RAY EXAM CHEST 1 VIEW: CPT

## 2025-02-12 PROCEDURE — 2500000003 HC RX 250 WO HCPCS: Performed by: HOSPITALIST

## 2025-02-12 PROCEDURE — 85520 HEPARIN ASSAY: CPT

## 2025-02-12 PROCEDURE — 0WP9X0Z REMOVAL OF DRAINAGE DEVICE FROM RIGHT PLEURAL CAVITY, EXTERNAL APPROACH: ICD-10-PCS | Performed by: STUDENT IN AN ORGANIZED HEALTH CARE EDUCATION/TRAINING PROGRAM

## 2025-02-12 RX ORDER — POTASSIUM CHLORIDE 750 MG/1
40 TABLET, EXTENDED RELEASE ORAL ONCE
Status: COMPLETED | OUTPATIENT
Start: 2025-02-12 | End: 2025-02-12

## 2025-02-12 RX ORDER — VALSARTAN 40 MG/1
40 TABLET ORAL DAILY
Status: DISCONTINUED | OUTPATIENT
Start: 2025-02-12 | End: 2025-02-18 | Stop reason: HOSPADM

## 2025-02-12 RX ORDER — AMLODIPINE BESYLATE 5 MG/1
10 TABLET ORAL DAILY
Status: DISCONTINUED | OUTPATIENT
Start: 2025-02-12 | End: 2025-02-18 | Stop reason: HOSPADM

## 2025-02-12 RX ADMIN — AMLODIPINE BESYLATE 10 MG: 5 TABLET ORAL at 13:16

## 2025-02-12 RX ADMIN — POTASSIUM CHLORIDE 40 MEQ: 750 TABLET, EXTENDED RELEASE ORAL at 13:16

## 2025-02-12 RX ADMIN — METRONIDAZOLE 500 MG: 5 INJECTION, SOLUTION INTRAVENOUS at 18:31

## 2025-02-12 RX ADMIN — SODIUM CHLORIDE, PRESERVATIVE FREE 10 ML: 5 INJECTION INTRAVENOUS at 09:16

## 2025-02-12 RX ADMIN — Medication 5 ML: at 21:38

## 2025-02-12 RX ADMIN — HEPARIN SODIUM 15 UNITS/KG/HR: 10000 INJECTION, SOLUTION INTRAVENOUS at 23:03

## 2025-02-12 RX ADMIN — BENZONATATE 200 MG: 100 CAPSULE ORAL at 21:37

## 2025-02-12 RX ADMIN — METOPROLOL TARTRATE 25 MG: 25 TABLET, FILM COATED ORAL at 09:15

## 2025-02-12 RX ADMIN — GUAIFENESIN 600 MG: 600 TABLET, EXTENDED RELEASE ORAL at 21:38

## 2025-02-12 RX ADMIN — HEPARIN SODIUM 15 UNITS/KG/HR: 10000 INJECTION, SOLUTION INTRAVENOUS at 03:24

## 2025-02-12 RX ADMIN — BENZONATATE 200 MG: 100 CAPSULE ORAL at 14:25

## 2025-02-12 RX ADMIN — METOPROLOL TARTRATE 25 MG: 25 TABLET, FILM COATED ORAL at 21:38

## 2025-02-12 RX ADMIN — WATER 2000 MG: 1 INJECTION INTRAMUSCULAR; INTRAVENOUS; SUBCUTANEOUS at 18:26

## 2025-02-12 RX ADMIN — METRONIDAZOLE 500 MG: 5 INJECTION, SOLUTION INTRAVENOUS at 11:59

## 2025-02-12 RX ADMIN — SODIUM CHLORIDE, PRESERVATIVE FREE 10 ML: 5 INJECTION INTRAVENOUS at 21:39

## 2025-02-12 RX ADMIN — PANTOPRAZOLE SODIUM 40 MG: 40 TABLET, DELAYED RELEASE ORAL at 07:14

## 2025-02-12 RX ADMIN — METRONIDAZOLE 500 MG: 5 INJECTION, SOLUTION INTRAVENOUS at 03:20

## 2025-02-12 RX ADMIN — Medication: at 21:40

## 2025-02-12 RX ADMIN — Medication: at 09:15

## 2025-02-12 RX ADMIN — VALSARTAN 40 MG: 40 TABLET, FILM COATED ORAL at 13:16

## 2025-02-12 RX ADMIN — GUAIFENESIN 600 MG: 600 TABLET, EXTENDED RELEASE ORAL at 09:15

## 2025-02-12 RX ADMIN — BENZONATATE 200 MG: 100 CAPSULE ORAL at 09:16

## 2025-02-12 ASSESSMENT — PAIN SCALES - GENERAL
PAINLEVEL_OUTOF10: 0

## 2025-02-12 NOTE — PROCEDURES
PROCEDURE NOTE  Date: 2/12/2025   Name: Jose Mc  YOB: 1955    Procedures    Thoracic Surgery Chest Tube Removal Note:    Patient was seen at the bedside. Right  pigtail was placed for right loculated effusion found to be a Strep intermedius empyema; underwent TPA x3. Recent CXR from 2/12/2025 was stable. CT demonstrated continued locuations and posterior collection, but overall improving. Clinically improving as well, did not think I would make a significant difference with surgery. Cough and valsalva were performed without evidence of an airleak. Output from the chest tube was 60 mL. Thus it was deemed safe to remove. Tube was pulled on full exhalation. No complications. Occlusive guaze, 4x4 and medipore tape were placed.      Follow up AM CXR.  Antibiotics per ID.  Follow up with thoracic surgery in 4 weeks with repeat CT Chest w/ contrast.    Jimbo Ruvalcaba MD  Thoracic Surgeon  [facility] Thoracic Surgery at [address], [address]  Phone: [phone]  Fax: [phone]

## 2025-02-12 NOTE — CARE COORDINATION
Transition of Care Plan:    RUR:   Prior Level of Functioning:   Disposition: [facility] has approved patient  for admission pending  taccycardia  being stable.  CM noted chest tube being pulled today.  Per ID note:  Patient is on ceftriaxone and IV flagy.  Anticipate 4 weeks IV abx.    CM awaiting ID Plan (orders).     CORNELIA: Per IDR Fri 2/14 or Sat 2/15    If SNF or IPR: Date FOC offered:   Date FOC received:   Accepting facility: [facility] [address] [address]  Date authorization started with reference number: INSURANCE Auth REQUIRED [facility]  Date authorization received and expires:   Follow up appointments:   DME needed: facility to determine  Transportation at discharge: likely BLS  IM/IMM Medicare/ letter given:   1st letter 2/11/25    Caregiver Contact: Lanny Armstrong [phone]  Discharge Caregiver contacted prior to discharge? no  Care Conference needed? no  Barriers to discharge:  medical stability and ID plan.    CM continuing to follow.  ELAN DsouzaW, CRM  [phone]

## 2025-02-13 ENCOUNTER — APPOINTMENT (OUTPATIENT)
Facility: HOSPITAL | Age: 70
DRG: 186 | End: 2025-02-13
Attending: INTERNAL MEDICINE
Payer: MEDICARE

## 2025-02-13 LAB
ANION GAP SERPL CALC-SCNC: 4 MMOL/L (ref 2–12)
BUN SERPL-MCNC: 4 MG/DL (ref 6–20)
BUN/CREAT SERPL: 8 (ref 12–20)
CALCIUM SERPL-MCNC: 7.6 MG/DL (ref 8.5–10.1)
CHLORIDE SERPL-SCNC: 107 MMOL/L (ref 97–108)
CO2 SERPL-SCNC: 27 MMOL/L (ref 21–32)
CREAT SERPL-MCNC: 0.5 MG/DL (ref 0.7–1.3)
ERYTHROCYTE [DISTWIDTH] IN BLOOD BY AUTOMATED COUNT: 17.8 % (ref 11.5–14.5)
GLUCOSE SERPL-MCNC: 84 MG/DL (ref 65–100)
HCT VFR BLD AUTO: 25.7 % (ref 36.6–50.3)
HGB BLD-MCNC: 7.7 G/DL (ref 12.1–17)
MCH RBC QN AUTO: 27 PG (ref 26–34)
MCHC RBC AUTO-ENTMCNC: 30 G/DL (ref 30–36.5)
MCV RBC AUTO: 90.2 FL (ref 80–99)
NRBC # BLD: 0 K/UL (ref 0–0.01)
NRBC BLD-RTO: 0 PER 100 WBC
PLATELET # BLD AUTO: 294 K/UL (ref 150–400)
PMV BLD AUTO: 10.8 FL (ref 8.9–12.9)
POTASSIUM SERPL-SCNC: 4.1 MMOL/L (ref 3.5–5.1)
RBC # BLD AUTO: 2.85 M/UL (ref 4.1–5.7)
SODIUM SERPL-SCNC: 138 MMOL/L (ref 136–145)
UFH PPP CHRO-ACNC: 0.33 IU/ML
WBC # BLD AUTO: 6.7 K/UL (ref 4.1–11.1)

## 2025-02-13 PROCEDURE — 99231 SBSQ HOSP IP/OBS SF/LOW 25: CPT

## 2025-02-13 PROCEDURE — 94760 N-INVAS EAR/PLS OXIMETRY 1: CPT

## 2025-02-13 PROCEDURE — 6370000000 HC RX 637 (ALT 250 FOR IP): Performed by: HOSPITALIST

## 2025-02-13 PROCEDURE — APPSS30 APP SPLIT SHARED TIME 16-30 MINUTES

## 2025-02-13 PROCEDURE — 85520 HEPARIN ASSAY: CPT

## 2025-02-13 PROCEDURE — 2700000000 HC OXYGEN THERAPY PER DAY

## 2025-02-13 PROCEDURE — 2500000003 HC RX 250 WO HCPCS

## 2025-02-13 PROCEDURE — 1100000000 HC RM PRIVATE

## 2025-02-13 PROCEDURE — 6360000002 HC RX W HCPCS: Performed by: HOSPITALIST

## 2025-02-13 PROCEDURE — 6370000000 HC RX 637 (ALT 250 FOR IP): Performed by: INTERNAL MEDICINE

## 2025-02-13 PROCEDURE — 97535 SELF CARE MNGMENT TRAINING: CPT

## 2025-02-13 PROCEDURE — 80048 BASIC METABOLIC PNL TOTAL CA: CPT

## 2025-02-13 PROCEDURE — 85027 COMPLETE CBC AUTOMATED: CPT

## 2025-02-13 PROCEDURE — 6360000002 HC RX W HCPCS

## 2025-02-13 PROCEDURE — 6370000000 HC RX 637 (ALT 250 FOR IP): Performed by: NURSE PRACTITIONER

## 2025-02-13 PROCEDURE — 2500000003 HC RX 250 WO HCPCS: Performed by: HOSPITALIST

## 2025-02-13 PROCEDURE — 71045 X-RAY EXAM CHEST 1 VIEW: CPT

## 2025-02-13 RX ADMIN — AMLODIPINE BESYLATE 10 MG: 5 TABLET ORAL at 09:22

## 2025-02-13 RX ADMIN — PANTOPRAZOLE SODIUM 40 MG: 40 TABLET, DELAYED RELEASE ORAL at 07:03

## 2025-02-13 RX ADMIN — BENZONATATE 200 MG: 100 CAPSULE ORAL at 20:50

## 2025-02-13 RX ADMIN — WATER 2000 MG: 1 INJECTION INTRAMUSCULAR; INTRAVENOUS; SUBCUTANEOUS at 18:01

## 2025-02-13 RX ADMIN — BENZONATATE 200 MG: 100 CAPSULE ORAL at 07:03

## 2025-02-13 RX ADMIN — METRONIDAZOLE 500 MG: 5 INJECTION, SOLUTION INTRAVENOUS at 03:33

## 2025-02-13 RX ADMIN — METRONIDAZOLE 500 MG: 5 INJECTION, SOLUTION INTRAVENOUS at 10:05

## 2025-02-13 RX ADMIN — Medication: at 20:51

## 2025-02-13 RX ADMIN — SODIUM CHLORIDE, PRESERVATIVE FREE 10 ML: 5 INJECTION INTRAVENOUS at 09:20

## 2025-02-13 RX ADMIN — Medication 5 ML: at 20:50

## 2025-02-13 RX ADMIN — METRONIDAZOLE 500 MG: 5 INJECTION, SOLUTION INTRAVENOUS at 18:04

## 2025-02-13 RX ADMIN — BENZONATATE 200 MG: 100 CAPSULE ORAL at 15:38

## 2025-02-13 RX ADMIN — VALSARTAN 40 MG: 40 TABLET, FILM COATED ORAL at 09:22

## 2025-02-13 RX ADMIN — SODIUM CHLORIDE, PRESERVATIVE FREE 10 ML: 5 INJECTION INTRAVENOUS at 20:51

## 2025-02-13 RX ADMIN — APIXABAN 5 MG: 5 TABLET, FILM COATED ORAL at 20:50

## 2025-02-13 RX ADMIN — GUAIFENESIN 600 MG: 600 TABLET, EXTENDED RELEASE ORAL at 09:20

## 2025-02-13 RX ADMIN — GUAIFENESIN 600 MG: 600 TABLET, EXTENDED RELEASE ORAL at 20:50

## 2025-02-13 RX ADMIN — METOPROLOL TARTRATE 25 MG: 25 TABLET, FILM COATED ORAL at 09:20

## 2025-02-13 RX ADMIN — METOPROLOL TARTRATE 25 MG: 25 TABLET, FILM COATED ORAL at 20:50

## 2025-02-13 RX ADMIN — Medication: at 09:25

## 2025-02-13 RX ADMIN — APIXABAN 5 MG: 5 TABLET, FILM COATED ORAL at 10:04

## 2025-02-13 NOTE — CARE COORDINATION
Transition of Care Plan:  D/C Planning UPDATE: IPR denied by BHAVIN Baez.  CM received call from Renata (Liaison [facility] [phone]).  Facility made several attempts to reach patient's emergency contact (Lanny Armstrong) and were unsuccessful.  [facility] denied IPR admission due to  concerns with successful community discharge and patient lacking  support in the community following a rehab stay.     CM called patient's \"friend/emergency contact (Lanny Armstrong).  She is actually a former neighbor who hasn't seen patient in several years.  Patient states he listed her as his emergency contact as his phone was left in his apt and Lanny's phone number was the only number he remembered.      The patient has a daughter Scarlet Hinds (who lives in [facility]) and a son Landry Mc (who lives in [facility]).   The patient states he has not seen his children for years and does not have their numbers available.   CM sending an email to CM leadership requesting a search.  CM sending update in perfect serve message to Dr. Jaimes.    RUR: 17%  Prior Level of Functioning: independent  Disposition: Now SNF  CORNELIA: unable to determine  If SNF or IPR: Date FOC offered: 2/13/25  Date FOC received: 2/13/25  Accepting facility:   Date authorization started with reference number: [employer] Medicare ins auth required  Date authorization received and expires:   Follow up appointments:   DME needed: facility to determine dme needs  Transportation at discharge: likely BLS  IM/IMM Medicare/ letter given:   1st letter 2/11/25  Is patient a [place] and connected with VA?    If yes, was  transfer form completed and VA notified?   Caregiver Contact: Lanny Armstrong  Discharge Caregiver contacted prior to discharge?   Care Conference needed?   Barriers to discharge: level of care change today from IPR to SNF     5:15pm  CM received message from patient that his son Landry lives in [facility]

## 2025-02-13 NOTE — DISCHARGE SUMMARY
Discharge Summary       PATIENT ID: Jose Mc  MRN: [id number]   YOB: 1955    DATE OF ADMISSION: 2/4/2025  4:35 PM    DATE OF DISCHARGE: 02/13/25     PRIMARY CARE PROVIDER: Lupillo Hobbs MD     ATTENDING PHYSICIAN: Dr. Gutierrez Jaimes  DISCHARGING PROVIDER: STEFFANIE Zurita - CNP    To contact this individual call [phone] and ask the  to page.  If unavailable ask to be transferred the Adult Hospitalist Department.    CONSULTATIONS: IP CONSULT TO THORACIC SURGERY  IP CONSULT TO INFECTIOUS DISEASES  IP CONSULT TO VASCULAR ACCESS TEAM    PROCEDURES/SURGERIES: * No surgery found *     ADMITTING DIAGNOSES & HOSPITAL COURSE:   Jose Mc is a 69 y.o. male who presents with transfer from [facility] .due to large pleural effusion     \"Jose Mc is a 69 y.o. male with known history of hypertension, anemia, prostate cancer stage IIa, history of alcohol use disorder, HLD who presents to the [facility]  ER 1/31 complaining of shortness of breath and 4 days worsening shortness of breath and cough.    Denies history of COPD.   Has remote 15-pack-year smoking history.  He was tx as pna and found to have large right side pleural effusion. S/p chest tube placement with IR 2/1. CTA with large loculated effusion of the R lung (15 x 12 x 22cm). Fluid culture with moderate alpha streptococcus, streptococcus intermedius.  S/p intrapleural lytics 2/2. Also developed Subcutaneous emphysema after chest tube. He was transferred to [facility] for thoracis consult.   Also has Chronic pulmonary emboli  Acute bilateral LE DVT , he is on heparin drip.     Large right pleural effusion/empyema  -Pleural effusion culture : MODERATE Streptococcus intermedius   -S/p placement of chest tube 2/1 (at [facility])  -s/p administration of fibrinolytic 2/6  -Repeat CT 2/10 reviewed with the patient  -s/p R chest tube removed 2/12  -Continue iv rocephin (through 2/28), flagyl (through 2/16) per ID  -Appreciate thoracic

## 2025-02-13 NOTE — WOUND CARE
WOCN Note:     Follow up assessment.    Assessed in 441/02 with RN.    Jose Mc is a 69 y.o. y/o male who presented for Pleural effusion [J90]  Admitted on 2/4/2025; LOS: 9    Past Medical History:   Diagnosis Date    Cancer (HCC) 07/2022    prostate, radiation    Depression     Hypertension     Stroke (HCC)     tia behind his eye branch retina artery     Lab Results   Component Value Date/Time    WBC 6.7 02/13/2025 05:16 AM        Tobacco Use      Smoking status: Never      Smokeless tobacco: Never     ADULT ORAL NUTRITION SUPPLEMENT; Dinner, Breakfast; Standard High Calorie/High Protein Oral Supplement  ADULT DIET; Regular; Low Sodium (2 gm)     Assessment:   Patient is alert, communicative and turns with assist.  Bed: comfort gel; will add air module  GI/: external catheter  Patient reports no pain.   Patient repositioned on right side with pillow.    Heels offloaded with pillows.   Heels intact without erythema.          Wound Assessment  POA Sacrum/buttock, blanching red erythema  TX: Applied foam dressing        Wound, Pressure Prevention & Skin Care Recommendations:    Minimize layers of linen/pads under patient to optimize support surface.    2.  Turn/reposition approximately every 2 hours and offload heels.   3.  Manage moisture/ Keep skin folds clean and dry/minimize brief usage.  4.  Specialty bed: comfort gel with air module.   5.  Sacrum/buttocks:  venelex and foam dressing    Transition of Care:   Plan to follow as needed while admitted to hospital.    ELAN BrandonN RN ON  [facility] Inpatient Wound [facility]  Available on [employer]  Office [phone]

## 2025-02-14 ENCOUNTER — APPOINTMENT (OUTPATIENT)
Facility: HOSPITAL | Age: 70
DRG: 186 | End: 2025-02-14
Attending: INTERNAL MEDICINE
Payer: MEDICARE

## 2025-02-14 LAB
ANION GAP SERPL CALC-SCNC: 7 MMOL/L (ref 2–12)
BUN SERPL-MCNC: 4 MG/DL (ref 6–20)
BUN/CREAT SERPL: 9 (ref 12–20)
CALCIUM SERPL-MCNC: 7.8 MG/DL (ref 8.5–10.1)
CHLORIDE SERPL-SCNC: 105 MMOL/L (ref 97–108)
CO2 SERPL-SCNC: 26 MMOL/L (ref 21–32)
CREAT SERPL-MCNC: 0.47 MG/DL (ref 0.7–1.3)
GLUCOSE SERPL-MCNC: 85 MG/DL (ref 65–100)
POTASSIUM SERPL-SCNC: 4 MMOL/L (ref 3.5–5.1)
SODIUM SERPL-SCNC: 138 MMOL/L (ref 136–145)

## 2025-02-14 PROCEDURE — 6370000000 HC RX 637 (ALT 250 FOR IP): Performed by: INTERNAL MEDICINE

## 2025-02-14 PROCEDURE — 94760 N-INVAS EAR/PLS OXIMETRY 1: CPT

## 2025-02-14 PROCEDURE — 2700000000 HC OXYGEN THERAPY PER DAY

## 2025-02-14 PROCEDURE — 80048 BASIC METABOLIC PNL TOTAL CA: CPT

## 2025-02-14 PROCEDURE — 1100000000 HC RM PRIVATE

## 2025-02-14 PROCEDURE — 6370000000 HC RX 637 (ALT 250 FOR IP): Performed by: HOSPITALIST

## 2025-02-14 PROCEDURE — 6360000002 HC RX W HCPCS: Performed by: HOSPITALIST

## 2025-02-14 PROCEDURE — 97530 THERAPEUTIC ACTIVITIES: CPT

## 2025-02-14 PROCEDURE — 2500000003 HC RX 250 WO HCPCS: Performed by: HOSPITALIST

## 2025-02-14 PROCEDURE — 71045 X-RAY EXAM CHEST 1 VIEW: CPT

## 2025-02-14 PROCEDURE — 2500000003 HC RX 250 WO HCPCS

## 2025-02-14 PROCEDURE — 6360000002 HC RX W HCPCS

## 2025-02-14 PROCEDURE — 6370000000 HC RX 637 (ALT 250 FOR IP): Performed by: NURSE PRACTITIONER

## 2025-02-14 PROCEDURE — 97535 SELF CARE MNGMENT TRAINING: CPT

## 2025-02-14 RX ORDER — METRONIDAZOLE 250 MG/1
500 TABLET ORAL EVERY 8 HOURS SCHEDULED
Status: DISCONTINUED | OUTPATIENT
Start: 2025-02-14 | End: 2025-02-17

## 2025-02-14 RX ADMIN — GUAIFENESIN 600 MG: 600 TABLET, EXTENDED RELEASE ORAL at 08:17

## 2025-02-14 RX ADMIN — WATER 2000 MG: 1 INJECTION INTRAMUSCULAR; INTRAVENOUS; SUBCUTANEOUS at 18:40

## 2025-02-14 RX ADMIN — BENZONATATE 200 MG: 100 CAPSULE ORAL at 21:52

## 2025-02-14 RX ADMIN — METRONIDAZOLE 500 MG: 250 TABLET ORAL at 21:53

## 2025-02-14 RX ADMIN — VALSARTAN 40 MG: 40 TABLET, FILM COATED ORAL at 08:18

## 2025-02-14 RX ADMIN — METRONIDAZOLE 500 MG: 5 INJECTION, SOLUTION INTRAVENOUS at 13:25

## 2025-02-14 RX ADMIN — METOPROLOL TARTRATE 25 MG: 25 TABLET, FILM COATED ORAL at 21:52

## 2025-02-14 RX ADMIN — GUAIFENESIN 600 MG: 600 TABLET, EXTENDED RELEASE ORAL at 21:52

## 2025-02-14 RX ADMIN — Medication: at 08:18

## 2025-02-14 RX ADMIN — Medication 5 ML: at 21:54

## 2025-02-14 RX ADMIN — PANTOPRAZOLE SODIUM 40 MG: 40 TABLET, DELAYED RELEASE ORAL at 06:42

## 2025-02-14 RX ADMIN — SODIUM CHLORIDE, PRESERVATIVE FREE 10 ML: 5 INJECTION INTRAVENOUS at 21:56

## 2025-02-14 RX ADMIN — METOPROLOL TARTRATE 25 MG: 25 TABLET, FILM COATED ORAL at 08:17

## 2025-02-14 RX ADMIN — APIXABAN 5 MG: 5 TABLET, FILM COATED ORAL at 21:54

## 2025-02-14 RX ADMIN — BENZONATATE 200 MG: 100 CAPSULE ORAL at 08:18

## 2025-02-14 RX ADMIN — AMLODIPINE BESYLATE 10 MG: 5 TABLET ORAL at 08:17

## 2025-02-14 RX ADMIN — METRONIDAZOLE 500 MG: 5 INJECTION, SOLUTION INTRAVENOUS at 03:22

## 2025-02-14 RX ADMIN — BENZONATATE 200 MG: 100 CAPSULE ORAL at 15:00

## 2025-02-14 RX ADMIN — APIXABAN 5 MG: 5 TABLET, FILM COATED ORAL at 08:18

## 2025-02-14 RX ADMIN — SODIUM CHLORIDE, PRESERVATIVE FREE 10 ML: 5 INJECTION INTRAVENOUS at 08:18

## 2025-02-15 ENCOUNTER — APPOINTMENT (OUTPATIENT)
Facility: HOSPITAL | Age: 70
DRG: 186 | End: 2025-02-15
Attending: INTERNAL MEDICINE
Payer: MEDICARE

## 2025-02-15 PROCEDURE — 2500000003 HC RX 250 WO HCPCS

## 2025-02-15 PROCEDURE — 2500000003 HC RX 250 WO HCPCS: Performed by: HOSPITALIST

## 2025-02-15 PROCEDURE — 6370000000 HC RX 637 (ALT 250 FOR IP): Performed by: HOSPITALIST

## 2025-02-15 PROCEDURE — 6360000002 HC RX W HCPCS

## 2025-02-15 PROCEDURE — 71045 X-RAY EXAM CHEST 1 VIEW: CPT

## 2025-02-15 PROCEDURE — 6370000000 HC RX 637 (ALT 250 FOR IP): Performed by: INTERNAL MEDICINE

## 2025-02-15 PROCEDURE — 6370000000 HC RX 637 (ALT 250 FOR IP): Performed by: NURSE PRACTITIONER

## 2025-02-15 PROCEDURE — 1100000000 HC RM PRIVATE

## 2025-02-15 RX ADMIN — METRONIDAZOLE 500 MG: 250 TABLET ORAL at 15:50

## 2025-02-15 RX ADMIN — GUAIFENESIN 600 MG: 600 TABLET, EXTENDED RELEASE ORAL at 21:11

## 2025-02-15 RX ADMIN — METOPROLOL TARTRATE 25 MG: 25 TABLET, FILM COATED ORAL at 21:12

## 2025-02-15 RX ADMIN — METOPROLOL TARTRATE 25 MG: 25 TABLET, FILM COATED ORAL at 09:25

## 2025-02-15 RX ADMIN — WATER 2000 MG: 1 INJECTION INTRAMUSCULAR; INTRAVENOUS; SUBCUTANEOUS at 18:38

## 2025-02-15 RX ADMIN — Medication: at 09:30

## 2025-02-15 RX ADMIN — SODIUM CHLORIDE, PRESERVATIVE FREE 10 ML: 5 INJECTION INTRAVENOUS at 21:18

## 2025-02-15 RX ADMIN — BENZONATATE 200 MG: 100 CAPSULE ORAL at 15:50

## 2025-02-15 RX ADMIN — APIXABAN 5 MG: 5 TABLET, FILM COATED ORAL at 21:12

## 2025-02-15 RX ADMIN — METRONIDAZOLE 500 MG: 250 TABLET ORAL at 07:13

## 2025-02-15 RX ADMIN — APIXABAN 5 MG: 5 TABLET, FILM COATED ORAL at 09:26

## 2025-02-15 RX ADMIN — GUAIFENESIN 600 MG: 600 TABLET, EXTENDED RELEASE ORAL at 09:25

## 2025-02-15 RX ADMIN — BENZONATATE 200 MG: 100 CAPSULE ORAL at 09:25

## 2025-02-15 RX ADMIN — PANTOPRAZOLE SODIUM 40 MG: 40 TABLET, DELAYED RELEASE ORAL at 07:13

## 2025-02-15 RX ADMIN — VALSARTAN 40 MG: 40 TABLET, FILM COATED ORAL at 09:26

## 2025-02-15 RX ADMIN — BENZONATATE 200 MG: 100 CAPSULE ORAL at 21:11

## 2025-02-15 RX ADMIN — Medication 5 ML: at 21:12

## 2025-02-15 RX ADMIN — Medication: at 21:15

## 2025-02-15 RX ADMIN — METRONIDAZOLE 500 MG: 250 TABLET ORAL at 21:11

## 2025-02-15 RX ADMIN — AMLODIPINE BESYLATE 10 MG: 5 TABLET ORAL at 09:25

## 2025-02-15 RX ADMIN — SODIUM CHLORIDE, PRESERVATIVE FREE 10 ML: 5 INJECTION INTRAVENOUS at 09:29

## 2025-02-16 ENCOUNTER — APPOINTMENT (OUTPATIENT)
Facility: HOSPITAL | Age: 70
DRG: 186 | End: 2025-02-16
Attending: INTERNAL MEDICINE
Payer: MEDICARE

## 2025-02-16 PROCEDURE — 6370000000 HC RX 637 (ALT 250 FOR IP): Performed by: HOSPITALIST

## 2025-02-16 PROCEDURE — 6360000002 HC RX W HCPCS

## 2025-02-16 PROCEDURE — 6370000000 HC RX 637 (ALT 250 FOR IP): Performed by: NURSE PRACTITIONER

## 2025-02-16 PROCEDURE — 6370000000 HC RX 637 (ALT 250 FOR IP): Performed by: INTERNAL MEDICINE

## 2025-02-16 PROCEDURE — 71045 X-RAY EXAM CHEST 1 VIEW: CPT

## 2025-02-16 PROCEDURE — 2500000003 HC RX 250 WO HCPCS

## 2025-02-16 PROCEDURE — 2500000003 HC RX 250 WO HCPCS: Performed by: HOSPITALIST

## 2025-02-16 PROCEDURE — 1100000000 HC RM PRIVATE

## 2025-02-16 RX ADMIN — SODIUM CHLORIDE, PRESERVATIVE FREE 10 ML: 5 INJECTION INTRAVENOUS at 08:51

## 2025-02-16 RX ADMIN — BENZONATATE 200 MG: 100 CAPSULE ORAL at 14:21

## 2025-02-16 RX ADMIN — METRONIDAZOLE 500 MG: 250 TABLET ORAL at 21:48

## 2025-02-16 RX ADMIN — VALSARTAN 40 MG: 40 TABLET, FILM COATED ORAL at 08:47

## 2025-02-16 RX ADMIN — METOPROLOL TARTRATE 25 MG: 25 TABLET, FILM COATED ORAL at 21:48

## 2025-02-16 RX ADMIN — Medication 5 ML: at 21:49

## 2025-02-16 RX ADMIN — APIXABAN 5 MG: 5 TABLET, FILM COATED ORAL at 08:47

## 2025-02-16 RX ADMIN — AMLODIPINE BESYLATE 10 MG: 5 TABLET ORAL at 08:46

## 2025-02-16 RX ADMIN — METRONIDAZOLE 500 MG: 250 TABLET ORAL at 14:21

## 2025-02-16 RX ADMIN — WATER 2000 MG: 1 INJECTION INTRAMUSCULAR; INTRAVENOUS; SUBCUTANEOUS at 17:24

## 2025-02-16 RX ADMIN — Medication: at 08:51

## 2025-02-16 RX ADMIN — GUAIFENESIN 600 MG: 600 TABLET, EXTENDED RELEASE ORAL at 08:46

## 2025-02-16 RX ADMIN — Medication: at 21:49

## 2025-02-16 RX ADMIN — BENZONATATE 200 MG: 100 CAPSULE ORAL at 21:48

## 2025-02-16 RX ADMIN — METRONIDAZOLE 500 MG: 250 TABLET ORAL at 06:30

## 2025-02-16 RX ADMIN — METOPROLOL TARTRATE 25 MG: 25 TABLET, FILM COATED ORAL at 08:47

## 2025-02-16 RX ADMIN — PANTOPRAZOLE SODIUM 40 MG: 40 TABLET, DELAYED RELEASE ORAL at 06:30

## 2025-02-16 RX ADMIN — GUAIFENESIN 600 MG: 600 TABLET, EXTENDED RELEASE ORAL at 21:48

## 2025-02-16 RX ADMIN — SODIUM CHLORIDE, PRESERVATIVE FREE 10 ML: 5 INJECTION INTRAVENOUS at 21:49

## 2025-02-16 RX ADMIN — APIXABAN 5 MG: 5 TABLET, FILM COATED ORAL at 21:48

## 2025-02-16 RX ADMIN — BENZONATATE 200 MG: 100 CAPSULE ORAL at 08:46

## 2025-02-17 ENCOUNTER — APPOINTMENT (OUTPATIENT)
Facility: HOSPITAL | Age: 70
DRG: 186 | End: 2025-02-17
Attending: INTERNAL MEDICINE
Payer: MEDICARE

## 2025-02-17 ENCOUNTER — TELEPHONE (OUTPATIENT)
Age: 70
End: 2025-02-17

## 2025-02-17 LAB
BACTERIA SPEC CULT: NORMAL
SERVICE CMNT-IMP: NORMAL

## 2025-02-17 PROCEDURE — 71045 X-RAY EXAM CHEST 1 VIEW: CPT

## 2025-02-17 PROCEDURE — 97535 SELF CARE MNGMENT TRAINING: CPT

## 2025-02-17 PROCEDURE — 6370000000 HC RX 637 (ALT 250 FOR IP): Performed by: HOSPITALIST

## 2025-02-17 PROCEDURE — 2500000003 HC RX 250 WO HCPCS: Performed by: HOSPITALIST

## 2025-02-17 PROCEDURE — 97530 THERAPEUTIC ACTIVITIES: CPT

## 2025-02-17 PROCEDURE — 2500000003 HC RX 250 WO HCPCS

## 2025-02-17 PROCEDURE — 6360000002 HC RX W HCPCS

## 2025-02-17 PROCEDURE — 94760 N-INVAS EAR/PLS OXIMETRY 1: CPT

## 2025-02-17 PROCEDURE — 2700000000 HC OXYGEN THERAPY PER DAY

## 2025-02-17 PROCEDURE — 6370000000 HC RX 637 (ALT 250 FOR IP): Performed by: NURSE PRACTITIONER

## 2025-02-17 PROCEDURE — 6370000000 HC RX 637 (ALT 250 FOR IP): Performed by: INTERNAL MEDICINE

## 2025-02-17 PROCEDURE — 1200000000 HC SEMI PRIVATE

## 2025-02-17 RX ORDER — OXYCODONE HYDROCHLORIDE 5 MG/1
5 TABLET ORAL EVERY 4 HOURS PRN
Status: DISCONTINUED | OUTPATIENT
Start: 2025-02-17 | End: 2025-02-18 | Stop reason: HOSPADM

## 2025-02-17 RX ADMIN — GUAIFENESIN 600 MG: 600 TABLET, EXTENDED RELEASE ORAL at 09:21

## 2025-02-17 RX ADMIN — APIXABAN 5 MG: 5 TABLET, FILM COATED ORAL at 09:22

## 2025-02-17 RX ADMIN — VALSARTAN 40 MG: 40 TABLET, FILM COATED ORAL at 09:21

## 2025-02-17 RX ADMIN — SODIUM CHLORIDE, PRESERVATIVE FREE 10 ML: 5 INJECTION INTRAVENOUS at 21:15

## 2025-02-17 RX ADMIN — BENZONATATE 200 MG: 100 CAPSULE ORAL at 09:21

## 2025-02-17 RX ADMIN — SODIUM CHLORIDE, PRESERVATIVE FREE 10 ML: 5 INJECTION INTRAVENOUS at 09:27

## 2025-02-17 RX ADMIN — METOPROLOL TARTRATE 25 MG: 25 TABLET, FILM COATED ORAL at 21:14

## 2025-02-17 RX ADMIN — PANTOPRAZOLE SODIUM 40 MG: 40 TABLET, DELAYED RELEASE ORAL at 06:27

## 2025-02-17 RX ADMIN — METOPROLOL TARTRATE 25 MG: 25 TABLET, FILM COATED ORAL at 09:22

## 2025-02-17 RX ADMIN — Medication 5 ML: at 21:14

## 2025-02-17 RX ADMIN — METRONIDAZOLE 500 MG: 250 TABLET ORAL at 06:27

## 2025-02-17 RX ADMIN — WATER 2000 MG: 1 INJECTION INTRAMUSCULAR; INTRAVENOUS; SUBCUTANEOUS at 18:38

## 2025-02-17 RX ADMIN — Medication: at 09:28

## 2025-02-17 RX ADMIN — BENZONATATE 200 MG: 100 CAPSULE ORAL at 21:14

## 2025-02-17 RX ADMIN — GUAIFENESIN 600 MG: 600 TABLET, EXTENDED RELEASE ORAL at 21:14

## 2025-02-17 RX ADMIN — Medication: at 21:14

## 2025-02-17 RX ADMIN — APIXABAN 5 MG: 5 TABLET, FILM COATED ORAL at 21:14

## 2025-02-17 RX ADMIN — BENZONATATE 200 MG: 100 CAPSULE ORAL at 16:21

## 2025-02-17 RX ADMIN — AMLODIPINE BESYLATE 10 MG: 5 TABLET ORAL at 09:22

## 2025-02-17 NOTE — CARE COORDINATION
Transition of Care: recs are now for SNF- patient has chosen [facility]- they have accepted; patient will need insurance auth (has [employer] medicare- the facility will start auth today 2/17)       NOTE: patient will need IV antibiotics until 2/8/25 via a PICC line    Accepted SNF:  [facility]  [facility]  [place]    Denied:  [facility]  [facility]  [facility]  [place]    Transport Plan: may need WC van or stretcher; pt is currently using oxygen    RUR: 11%    Main contacts are friend- Lanny Armstrong- [phone]    Discharge pending:  -patient continues on IV antibiotic (until 2/28)   -pending medical progress   -pt will need PICC placement    [id number]: this CM met with pt at bedside to get his choice of SNF from accepted list; he chose [facility] because it is close to his apartment; this CM called [facility] and spoke to Yessica; they have accepted and will start insurance auth today        Prior Level of Functioning: lives independently in his own apartment  Disposition:  SNF  CORNELIA: 24 hours  If SNF or IPR: Date FOC offered: by other CM on a different unit  Date FOC received: 2/17  Accepting facility: multiple facilities- pt wants [facility]  Date authorization started with reference number: not yet- will be started by facility on 2/17  Date authorization received and expires: not yet  Follow up appointments: specialists/pcp  DME needed: none ordered  Transportation at discharge: WC van or stretcher  IM/[facility] Medicare/[facility] [employer] letter given:   Is patient a [place] and connected with VA? no   If yes, was  transfer form completed and VA notified? N/a  Caregiver Contact: pts friend  Discharge Caregiver contacted prior to discharge? Not yet  Care Conference needed? no  Barriers to discharge:  insurance auth, medical progress, PICC placement    CM following   Cecelia Oakley RN

## 2025-02-18 VITALS
SYSTOLIC BLOOD PRESSURE: 119 MMHG | OXYGEN SATURATION: 94 % | HEIGHT: 71 IN | BODY MASS INDEX: 28.46 KG/M2 | WEIGHT: 203.26 LBS | RESPIRATION RATE: 17 BRPM | HEART RATE: 106 BPM | DIASTOLIC BLOOD PRESSURE: 89 MMHG | TEMPERATURE: 97.9 F

## 2025-02-18 LAB
BASOPHILS # BLD: 0.08 K/UL (ref 0–0.1)
BASOPHILS NFR BLD: 1 % (ref 0–1)
DIFFERENTIAL METHOD BLD: ABNORMAL
EOSINOPHIL # BLD: 0.7 K/UL (ref 0–0.4)
EOSINOPHIL NFR BLD: 9 % (ref 0–7)
ERYTHROCYTE [DISTWIDTH] IN BLOOD BY AUTOMATED COUNT: 18.5 % (ref 11.5–14.5)
HCT VFR BLD AUTO: 28.4 % (ref 36.6–50.3)
HGB BLD-MCNC: 8.4 G/DL (ref 12.1–17)
IMM GRANULOCYTES # BLD AUTO: 0 K/UL
IMM GRANULOCYTES NFR BLD AUTO: 0 %
LYMPHOCYTES # BLD: 0.94 K/UL (ref 0.8–3.5)
LYMPHOCYTES NFR BLD: 12 % (ref 12–49)
MCH RBC QN AUTO: 27.2 PG (ref 26–34)
MCHC RBC AUTO-ENTMCNC: 29.6 G/DL (ref 30–36.5)
MCV RBC AUTO: 91.9 FL (ref 80–99)
MONOCYTES # BLD: 0.55 K/UL (ref 0–1)
MONOCYTES NFR BLD: 7 % (ref 5–13)
NEUTS BAND NFR BLD MANUAL: 1 % (ref 0–6)
NEUTS SEG # BLD: 5.53 K/UL (ref 1.8–8)
NEUTS SEG NFR BLD: 70 % (ref 32–75)
NRBC # BLD: 0 K/UL (ref 0–0.01)
NRBC BLD-RTO: 0 PER 100 WBC
PLATELET # BLD AUTO: 344 K/UL (ref 150–400)
PMV BLD AUTO: 10.8 FL (ref 8.9–12.9)
RBC # BLD AUTO: 3.09 M/UL (ref 4.1–5.7)
RBC MORPH BLD: ABNORMAL
WBC # BLD AUTO: 7.8 K/UL (ref 4.1–11.1)
WBC MORPH BLD: ABNORMAL

## 2025-02-18 PROCEDURE — 2700000000 HC OXYGEN THERAPY PER DAY

## 2025-02-18 PROCEDURE — 2709999900 HC NON-CHARGEABLE SUPPLY

## 2025-02-18 PROCEDURE — 6370000000 HC RX 637 (ALT 250 FOR IP): Performed by: NURSE PRACTITIONER

## 2025-02-18 PROCEDURE — 2500000003 HC RX 250 WO HCPCS

## 2025-02-18 PROCEDURE — 2500000003 HC RX 250 WO HCPCS: Performed by: HOSPITALIST

## 2025-02-18 PROCEDURE — 97116 GAIT TRAINING THERAPY: CPT

## 2025-02-18 PROCEDURE — C1751 CATH, INF, PER/CENT/MIDLINE: HCPCS

## 2025-02-18 PROCEDURE — 36410 VNPNXR 3YR/> PHY/QHP DX/THER: CPT

## 2025-02-18 PROCEDURE — 85025 COMPLETE CBC W/AUTO DIFF WBC: CPT

## 2025-02-18 PROCEDURE — 97530 THERAPEUTIC ACTIVITIES: CPT

## 2025-02-18 PROCEDURE — 6370000000 HC RX 637 (ALT 250 FOR IP): Performed by: INTERNAL MEDICINE

## 2025-02-18 PROCEDURE — 6370000000 HC RX 637 (ALT 250 FOR IP): Performed by: HOSPITALIST

## 2025-02-18 PROCEDURE — 94760 N-INVAS EAR/PLS OXIMETRY 1: CPT

## 2025-02-18 PROCEDURE — 97535 SELF CARE MNGMENT TRAINING: CPT

## 2025-02-18 PROCEDURE — 76937 US GUIDE VASCULAR ACCESS: CPT

## 2025-02-18 PROCEDURE — 05HA33Z INSERTION OF INFUSION DEVICE INTO LEFT BRACHIAL VEIN, PERCUTANEOUS APPROACH: ICD-10-PCS | Performed by: INTERNAL MEDICINE

## 2025-02-18 PROCEDURE — 6360000002 HC RX W HCPCS

## 2025-02-18 RX ORDER — PANTOPRAZOLE SODIUM 40 MG/1
40 TABLET, DELAYED RELEASE ORAL
Qty: 30 TABLET | Refills: 0 | Status: SHIPPED
Start: 2025-02-19

## 2025-02-18 RX ORDER — GUAIFENESIN 600 MG/1
600 TABLET, EXTENDED RELEASE ORAL 2 TIMES DAILY
Qty: 14 TABLET | Refills: 0 | Status: SHIPPED
Start: 2025-02-18

## 2025-02-18 RX ORDER — CASTOR OIL AND BALSAM, PERU 788; 87 MG/G; MG/G
OINTMENT TOPICAL 2 TIMES DAILY
Qty: 1 EACH | Refills: 0 | Status: SHIPPED
Start: 2025-02-18

## 2025-02-18 RX ORDER — BENZONATATE 100 MG/1
100 CAPSULE ORAL 3 TIMES DAILY PRN
Qty: 21 CAPSULE | Refills: 0 | Status: SHIPPED
Start: 2025-02-18 | End: 2025-02-25

## 2025-02-18 RX ADMIN — VALSARTAN 40 MG: 40 TABLET, FILM COATED ORAL at 09:50

## 2025-02-18 RX ADMIN — GUAIFENESIN 600 MG: 600 TABLET, EXTENDED RELEASE ORAL at 09:50

## 2025-02-18 RX ADMIN — BENZONATATE 100 MG: 100 CAPSULE ORAL at 05:14

## 2025-02-18 RX ADMIN — BENZONATATE 200 MG: 100 CAPSULE ORAL at 09:50

## 2025-02-18 RX ADMIN — SODIUM CHLORIDE, PRESERVATIVE FREE 10 ML: 5 INJECTION INTRAVENOUS at 09:50

## 2025-02-18 RX ADMIN — BENZONATATE 100 MG: 100 CAPSULE ORAL at 12:01

## 2025-02-18 RX ADMIN — Medication: at 09:50

## 2025-02-18 RX ADMIN — BENZONATATE 200 MG: 100 CAPSULE ORAL at 15:45

## 2025-02-18 RX ADMIN — WATER 2000 MG: 1 INJECTION INTRAMUSCULAR; INTRAVENOUS; SUBCUTANEOUS at 18:01

## 2025-02-18 RX ADMIN — PANTOPRAZOLE SODIUM 40 MG: 40 TABLET, DELAYED RELEASE ORAL at 05:14

## 2025-02-18 RX ADMIN — METOPROLOL TARTRATE 25 MG: 25 TABLET, FILM COATED ORAL at 09:50

## 2025-02-18 RX ADMIN — APIXABAN 5 MG: 5 TABLET, FILM COATED ORAL at 09:50

## 2025-02-18 RX ADMIN — AMLODIPINE BESYLATE 10 MG: 5 TABLET ORAL at 09:50

## 2025-02-18 NOTE — PROGRESS NOTES
[facility] [place] [facility]                                                                                          Hospitalist Progress Note  Addison Starks MD  Office Phone: [phone]        Date of Service:  2025  NAME:  Jose Mc  :  1955  MRN:  [id number]       Admission Summary:   Jose Mc is a 69 y.o. male who presents with transfer from [facility] .due to large pleural effusion       \"Jose Mc is a 69 y.o. male with known history of hypertension, anemia, prostate cancer stage IIa, history of alcohol use disorder, HLD who presents to the [facility]  ER  complaining of shortness of breath and 4 days worsening shortness of breath and cough.    Denies history of COPD.   Has remote 15-pack-year smoking history.  He was tx as pna and found to have large right side pleural effusion. S/p chest tube placement with IR 2. CTA with large loculated effusion of the R lung (15 x 12 x 22cm). Fluid culture with moderate alpha streptococcus, streptococcus intermedius.  S/p intrapleural lytics 2/. Also developed Subcutaneous emphysema after chest tube. He was transferred to [facility] for thoracis consult.   Also has Chronic pulmonary emboli  Acute bilateral LE DVT , he is on heparin drip.        Interval history / Subjective:   Follow up Pleural effusion  Transfer from [facility]  Feels better     Assessment & Plan:     Large right pleural effusion, most likely loculated.   Concerning for empyema vs malignancy  -chest tube to low wall suction 2  -Pleural effusion culture : MODERATE Streptococcus intermedius   -Continue iv rocephin (increased dose), flagyl  -Appreciate ID  Added tessalon for cough   -Appreciate thoracic surgery/Pulmonary         Subcutaneous emphysema: Likely secondary to chest tube.   - F/u thoracic surgery recommendations     Chronic pulmonary emboli  Acute bilateral LE DVT:   - Continue heparin gtt for now for future procedure   - Plan for DOAC on 
    [facility]  Hospitalist Group                                                                                          Hospitalist Progress Note  Ana Mack MD  Office Phone: [phone]        Date of Service:  2025  NAME:  Jose Mc  :  1955  MRN:  [id number]       Admission Summary:   Jose Mc is a 69 y.o. male who presents with transfer from [facility] .due to large pleural effusion     \"Jose Mc is a 69 y.o. male with known history of hypertension, anemia, prostate cancer stage IIa, history of alcohol use disorder, HLD who presents to the [facility]  ER  complaining of shortness of breath and 4 days worsening shortness of breath and cough.    Denies history of COPD.   Has remote 15-pack-year smoking history.  He was tx as pna and found to have large right side pleural effusion. S/p chest tube placement with IR 2/. CTA with large loculated effusion of the R lung (15 x 12 x 22cm). Fluid culture with moderate alpha streptococcus, streptococcus intermedius.  S/p intrapleural lytics 2/. Also developed Subcutaneous emphysema after chest tube. He was transferred to [facility] for thoracis consult.   Also has Chronic pulmonary emboli  Acute bilateral LE DVT , he is on heparin drip.     Interval history / Subjective:   Follow up Pleural effusion  No acute complaints  R chest tube in place  AFVSS on 4L NC  Hgb 6.8 this AM, PRBC ordered     Assessment & Plan:     Large right pleural effusion/empyema  -chest tube per thoracic  -Pleural effusion culture : MODERATE Streptococcus intermedius   -Continue iv rocephin, flagyl per ID- will need prolonged abx  -tPA and Dornase per thoracic surgery  -plan repeat CT Monday     Subcutaneous emphysema: Likely secondary to chest tube  - F/u thoracic surgery     Chronic pulmonary emboli  Acute bilateral LE DVT:   - Continue heparin gtt for now   - Plan for DOAC on discharge      Tachycardia:   Due to hypoxia, anemia, PE  Cont metoprolol 
    [facility] Adult  Hospitalist Group                                                                                          Hospitalist Progress Note  Addison Starks MD  Office Phone: [phone]        Date of Service:  2025  NAME:  Jose Mc  :  1955  MRN:  [id number]       Admission Summary:   Jose Mc is a 69 y.o. male who presents with transfer from [facility] .due to large pleural effusion     \"Jose Mc is a 69 y.o. male with known history of hypertension, anemia, prostate cancer stage IIa, history of alcohol use disorder, HLD who presents to the [facility]  ER  complaining of shortness of breath and 4 days worsening shortness of breath and cough.    Denies history of COPD.   Has remote 15-pack-year smoking history.  He was tx as pna and found to have large right side pleural effusion. S/p chest tube placement with IR . CTA with large loculated effusion of the R lung (15 x 12 x 22cm). Fluid culture with moderate alpha streptococcus, streptococcus intermedius.  S/p intrapleural lytics 2/. Also developed Subcutaneous emphysema after chest tube. He was transferred to [facility] for thoracis consult.   Also has Chronic pulmonary emboli  Acute bilateral LE DVT , he is on heparin drip.     Interval history / Subjective:   Follow up Pleural effusion  No acute complaints  R chest tube in place  AFVSS on 2L NC   Pigtail to waterseal  Comfortably sitting in chair  Assessment & Plan:     Large right pleural effusion/empyema  -chest tube per thoracic  -Pleural effusion culture : MODERATE Streptococcus intermedius   -Continue iv rocephin, flagyl per ID- will need prolonged abx  -tPA and Dornase per thoracic surgery  -Repeat CT 2/10 reviewed with the patient  -Appreciate thoracic surgery, continue pigtail to waterseal till output <100     Subcutaneous emphysema: Likely secondary to chest tube  - F/u thoracic surgery     Chronic pulmonary emboli  Acute bilateral LE DVT:   - Continue 
    [facility] Adult  Hospitalist Group                                                                                          Hospitalist Progress Note  Ana Mack MD  Office Phone: [phone]        Date of Service:  2025  NAME:  Jose Mc  :  1955  MRN:  [id number]       Admission Summary:   Jose Mc is a 69 y.o. male who presents with transfer from [facility] .due to large pleural effusion     \"Jose Mc is a 69 y.o. male with known history of hypertension, anemia, prostate cancer stage IIa, history of alcohol use disorder, HLD who presents to the [facility]  ER  complaining of shortness of breath and 4 days worsening shortness of breath and cough.    Denies history of COPD.   Has remote 15-pack-year smoking history.  He was tx as pna and found to have large right side pleural effusion. S/p chest tube placement with IR . CTA with large loculated effusion of the R lung (15 x 12 x 22cm). Fluid culture with moderate alpha streptococcus, streptococcus intermedius.  S/p intrapleural lytics 2/. Also developed Subcutaneous emphysema after chest tube. He was transferred to [facility] for thoracis consult.   Also has Chronic pulmonary emboli  Acute bilateral LE DVT , he is on heparin drip.     Interval history / Subjective:   Follow up Pleural effusion  No acute complaints  R chest tube in place  AFVSS on 2L NC     Assessment & Plan:     Large right pleural effusion/empyema  -chest tube per thoracic  -Pleural effusion culture : MODERATE Streptococcus intermedius   -Continue iv rocephin, flagyl per ID- will need prolonged abx  -tPA and Dornase per thoracic surgery  -plan repeat CT Monday     Subcutaneous emphysema: Likely secondary to chest tube  - F/u thoracic surgery     Chronic pulmonary emboli  Acute bilateral LE DVT:   - Continue heparin gtt for now   - Plan for DOAC on discharge      Tachycardia: improved  Due to hypoxia, anemia, PE  Cont metoprolol      Acute on chronic 
    [facility] Adult  Hospitalist Group                                                                                          Hospitalist Progress Note  STEFFANIE Temple NP  Office Phone: [phone]        Date of Service:  2025  NAME:  Jose Mc  :  1955  MRN:  [id number]       Admission Summary:   Jose Mc is a 69 y.o. male who presents with transfer from [facility] .due to large pleural effusion       \"Jose Mc is a 69 y.o. male with known history of hypertension, anemia, prostate cancer stage IIa, history of alcohol use disorder, HLD who presents to the [facility]  ER  complaining of shortness of breath and 4 days worsening shortness of breath and cough.    Denies history of COPD.   Has remote 15-pack-year smoking history.  He was tx as pna and found to have large right side pleural effusion. S/p chest tube placement with IR . CTA with large loculated effusion of the R lung (15 x 12 x 22cm). Fluid culture with moderate alpha streptococcus, streptococcus intermedius.  S/p intrapleural lytics . Also developed Subcutaneous emphysema after chest tube. He was transferred to [facility] for thoracic surgery  consult.   Also has Chronic pulmonary emboli  Acute bilateral LE DVT , he was treated with Heparin gtt on admission and then transitioned to [email or web address]       Interval history / Subjective:    Patient seen and examined, sitting on edge of bed eating lunch on 1 L NC. No complaints or acute issues.    Medically stable for discharge - awaiting insurance auth for SNF.    Ceftriaxone renewed. Discontinued flagyl (completed course).       Assessment & Plan:     Large right pleural effusion/empyema  -Pleural effusion culture : MODERATE Streptococcus intermedius   -S/p placement of chest tube  (at [facility]) -- removed   -seen by ID: ceftriaxone IV 2 g Q 24 hrs through 25, inclusive; Metronidazole  mg TID through 25, inclusive (completed metronidazole) 
    [facility] Adult  Hospitalist Group                                                                                          Hospitalist Progress Note  STEFFANIE Zurita - CNP  Office Phone: [phone]        Date of Service:  2025  NAME:  Jose Mc  :  1955  MRN:  [id number]       Admission Summary:   Jose Mc is a 69 y.o. male who presents with transfer from [facility] .due to large pleural effusion     \"Jose Mc is a 69 y.o. male with known history of hypertension, anemia, prostate cancer stage IIa, history of alcohol use disorder, HLD who presents to the [facility]  ER  complaining of shortness of breath and 4 days worsening shortness of breath and cough.    Denies history of COPD.   Has remote 15-pack-year smoking history.  He was tx as pna and found to have large right side pleural effusion. S/p chest tube placement with IR . CTA with large loculated effusion of the R lung (15 x 12 x 22cm). Fluid culture with moderate alpha streptococcus, streptococcus intermedius.  S/p intrapleural lytics 2/. Also developed Subcutaneous emphysema after chest tube. He was transferred to [facility] for thoracis consult.   Also has Chronic pulmonary emboli  Acute bilateral LE DVT , he is on heparin drip.     Interval history / Subjective:   Follow up Pleural effusion  No acute complaints  S/p placement of chest tube  (at [facility])  S/p administration of fibrinolytic   S/p R chest tube removed , am cxr  with no evidence of pneumothorax  AFVSS on 2L NC     Medically stable for discharge, pending placement    Assessment & Plan:     Large right pleural effusion/empyema  -Pleural effusion culture : MODERATE Streptococcus intermedius   -S/p placement of chest tube  (at [facility])  -s/p administration of fibrinolytic   -Repeat CT 2/10 reviewed with the patient  -s/p R chest tube removed   -Continue iv rocephin (through ), flagyl (through ) per ID  -Appreciate 
    [facility] Adult  Hospitalist Group                                                                                          Hospitalist Progress Note  STEFFANIE Zurita - CNP  Office Phone: [phone]        Date of Service:  2025  NAME:  Jose Mc  :  1955  MRN:  [id number]       Admission Summary:   Jose Mc is a 69 y.o. male who presents with transfer from [facility] .due to large pleural effusion     \"Jose Mc is a 69 y.o. male with known history of hypertension, anemia, prostate cancer stage IIa, history of alcohol use disorder, HLD who presents to the [facility]  ER  complaining of shortness of breath and 4 days worsening shortness of breath and cough.    Denies history of COPD.   Has remote 15-pack-year smoking history.  He was tx as pna and found to have large right side pleural effusion. S/p chest tube placement with IR . CTA with large loculated effusion of the R lung (15 x 12 x 22cm). Fluid culture with moderate alpha streptococcus, streptococcus intermedius.  S/p intrapleural lytics . Also developed Subcutaneous emphysema after chest tube. He was transferred to [facility] for thoracis consult.   Also has Chronic pulmonary emboli  Acute bilateral LE DVT , he is on heparin drip.     Interval history / Subjective:     seen and examined on rounds, denies fever chest pain shortness of breath, no n/v/d     Medically stable for discharge, pending placement    Assessment & Plan:     Large right pleural effusion/empyema  -Pleural effusion culture : MODERATE Streptococcus intermedius   -S/p placement of chest tube  (at [facility])  -s/p administration of fibrinolytic   -Repeat CT 2/10 reviewed with the patient  -s/p R chest tube removed   -Continue iv rocephin (through ), flagyl (through ) per ID  -Appreciate thoracic surgery, repeat CXR tomorrow   -Appreciate discussion with ID, outpatient IV antibiotics given     Subcutaneous emphysema: 
    [facility] Adult  Hospitalist Group                                                                                          Hospitalist Progress Note  Addison Starks MD  Office Phone: [phone]        Date of Service:  2/10/2025  NAME:  Jose Mc  :  1955  MRN:  [id number]       Admission Summary:   Jose Mc is a 69 y.o. male who presents with transfer from [facility] .due to large pleural effusion     \"Jose Mc is a 69 y.o. male with known history of hypertension, anemia, prostate cancer stage IIa, history of alcohol use disorder, HLD who presents to the [facility]  ER  complaining of shortness of breath and 4 days worsening shortness of breath and cough.    Denies history of COPD.   Has remote 15-pack-year smoking history.  He was tx as pna and found to have large right side pleural effusion. S/p chest tube placement with IR . CTA with large loculated effusion of the R lung (15 x 12 x 22cm). Fluid culture with moderate alpha streptococcus, streptococcus intermedius.  S/p intrapleural lytics 2/. Also developed Subcutaneous emphysema after chest tube. He was transferred to [facility] for thoracis consult.   Also has Chronic pulmonary emboli  Acute bilateral LE DVT , he is on heparin drip.     Interval history / Subjective:   Follow up Pleural effusion  No acute complaints  R chest tube in place  AFVSS on 2L NC     Assessment & Plan:     Large right pleural effusion/empyema  -chest tube per thoracic  -Pleural effusion culture : MODERATE Streptococcus intermedius   -Continue iv rocephin, flagyl per ID- will need prolonged abx  -tPA and Dornase per thoracic surgery  -plan repeat CT 2/10     Subcutaneous emphysema: Likely secondary to chest tube  - F/u thoracic surgery     Chronic pulmonary emboli  Acute bilateral LE DVT:   - Continue heparin gtt for now   - Plan for DOAC on discharge      Tachycardia: improved  Due to hypoxia, anemia, PE  Cont metoprolol      Acute on chronic 
    [facility] Adult  Hospitalist Group                                                                                          Hospitalist Progress Note  Ana Mack MD  Office Phone: [phone]        Date of Service:  2025  NAME:  Jose Mc  :  1955  MRN:  [id number]       Admission Summary:   Jose Mc is a 69 y.o. male who presents with transfer from [facility] .due to large pleural effusion     \"Jose Mc is a 69 y.o. male with known history of hypertension, anemia, prostate cancer stage IIa, history of alcohol use disorder, HLD who presents to the [facility]  ER  complaining of shortness of breath and 4 days worsening shortness of breath and cough.    Denies history of COPD.   Has remote 15-pack-year smoking history.  He was tx as pna and found to have large right side pleural effusion. S/p chest tube placement with IR 2/. CTA with large loculated effusion of the R lung (15 x 12 x 22cm). Fluid culture with moderate alpha streptococcus, streptococcus intermedius.  S/p intrapleural lytics 2/. Also developed Subcutaneous emphysema after chest tube. He was transferred to [facility] for thoracis consult.   Also has Chronic pulmonary emboli  Acute bilateral LE DVT , he is on heparin drip.     Interval history / Subjective:   Follow up Pleural effusion  No acute complaints  R chest tube in place  AFVSS on 4L NC     Assessment & Plan:     Large right pleural effusion/empyema  -chest tube per thoracic  -Pleural effusion culture : MODERATE Streptococcus intermedius   -Continue iv rocephin, flagyl per ID  -tPA and Dornase per thoracic surgery     Subcutaneous emphysema: Likely secondary to chest tube  - F/u thoracic surgery     Chronic pulmonary emboli  Acute bilateral LE DVT:   - Continue heparin gtt for now   - Plan for DOAC on discharge      Tachycardia:   Due to hypoxia, anemia, PE  Cont metoprolol      Anemia  Hgb stable, no e/o active bleeding  - Transfusion threshold Hb < 
.4 Eyes Skin Assessment     NAME:  Jose Mc  YOB: 1955  MEDICAL RECORD NUMBER:  [id number]    The patient is being assessed for  Admission    I agree that at least one RN has performed a thorough Head to Toe Skin Assessment on the patient. ALL assessment sites listed below have been assessed.      Areas assessed by both nurses:    Head, Face, Ears, Shoulders, Back, Chest, Arms, Elbows, Hands, Sacrum. Buttock, Coccyx, Ischium, Legs. Feet and Heels, and Under Medical Devices         Does the Patient have a Wound? No noted wound(s)     redness/bruising bilat elbows/ arms; redness bilat heels; scattered bruising BLE; redness sacrum; chest tube R side   Bijan Prevention initiated by RN: Yes  Wound Care Orders initiated by RN: No    Pressure Injury (Stage 3,4, Unstageable, DTI, NWPT, and Complex wounds) if present, place Wound referral order by RN under : No    New Ostomies, if present place, Ostomy referral order under : No     Nurse 1 eSignature: Electronically signed by Karen Kaey RN on 2/4/25 at 6:13 PM EST    **SHARE this note so that the co-signing nurse can place an eSignature**    Nurse 2 eSignature: Electronically signed by Tala Mast RN on 2/4/25 at 6:14 PM EST   
Bedside shift change report given to MELISSA Doherty (oncoming nurse) by MELISSA Bojorquez (offgoing nurse). Report included the following information Nurse Handoff Report, Index, ED Encounter Summary, Adult Overview, Surgery Report, Intake/Output, MAR, Recent Results, Cardiac Rhythm NSR, Quality Measures, Neuro Assessment, and Event Log.    
Bedside shift change report given to MELISSA Vincent (oncoming nurse) by MELISSA Bojorquez (offgoing nurse). Report included the following information Nurse Handoff Report, Index, ED Encounter Summary, ED SBAR, Adult Overview, Surgery Report, MAR, Cardiac Rhythm NSR, Alarm Parameters, Quality Measures, Neuro Assessment, and Event Log.    
Bedside shift change report given to Samantha RN (oncoming nurse) by Alka RN (offgoing nurse). Report included the following information Nurse Handoff Report.    
Clinical Pharmacy Note: Re: IV to PO Automatic Conversion - Antibiotic    Please note: Jose Mc’s medication(s) (metronidazole) has/have been changed from IV to PO based on the following criteria:    The patient:  Received IV therapy for at least 24 hours   Is tolerating diet more advanced than clear liquids  Is tolerating oral medications  Is not on vasopressor blood pressure support (i.e. no signs of shock)      This IV to PO conversion is based on the P&[employer] approved automatic conversion policy for eligible patients.  Please call with questions.   
Comprehensive Nutrition Assessment    Type and Reason for Visit:  Initial, LOS    Nutrition Recommendations/Plan:   Recommend a low sodium diet.  Recommend High Kcal/High Protein ONS BID (350 kcals, 20 gm protein each).  To monitor nutrition intake/tolerance, labs, weight, and plan of care during admission.     Malnutrition Assessment:  Malnutrition Status:  Moderate malnutrition (02/12/25 1345)    Context:  Acute Illness     Findings of the 6 clinical characteristics of malnutrition:  Energy Intake:  75% or less of estimated energy requirements for 7 or more days  Weight Loss:  Greater than 2% over 1 week     Body Fat Loss:  Unable to assess     Muscle Mass Loss:  Unable to assess    Fluid Accumulation:  Mild Extremities   Strength:  Not Performed    Nutrition Assessment:    70 yo male admitted for pleural effusion. PMH includes: HTN, anemia, prostate cancer stage IIa, alcohol use disorder, HLD.    Patient not available during attempted visit. Currently ordered a regular diet. Per chart, patient consumed % of 1 documented meal, 51-75% of 8 documented meals, and 26-50% of 6 documented meals since admission. No nausea/emesis, and last BM on 2/11.  Reviewed documented weight history- would indicate 3% significant weight loss over 1 week, and 10% significant weight loss over 2 days (unsure of accuracy). To note, patient with edema.     Nutrition Related Findings:    Edema: Trace RLE LLE   Wound Type:  (redness to sacrum)       Medications include: rocephin, lopressor, flagyl, protonix, diovan, heparin in D5%    Labs include: K+ 3.4 mmol/L (L), BUN 4 mg/dL (L), CR 0.53 mg/dL (L), CA 7.6 mg/dL (L)    Current Nutrition Intake & Therapies:    Average Meal Intake: 26-50%, 51-75%  Average Supplements Intake: None Ordered  ADULT DIET; Regular    Anthropometric Measures:  Height: 180.3 cm (5' 10.98\")  Ideal Body Weight (IBW): 172 lbs (78 kg)       Current Body Weight: 83.7 kg (184 lb 8.4 oz), 107.3 % IBW. Weight 
Comprehensive Nutrition Assessment    Type and Reason for Visit: Reassess    Nutrition Recommendations/Plan:   Continue regular diet, low Na  Ensure Plus HP BID  Continue to document % intake of meals in flowsheets  Continue to trend weights - standing scaled preferred        Malnutrition Assessment:  Malnutrition Status:  Moderate malnutrition (02/12/25 1345)    Context:  Acute Illness     Findings of the 6 clinical characteristics of malnutrition:  Energy Intake:  75% or less of estimated energy requirements for 7 or more days  Weight Loss:  Unable to assess (2/2 fluid)     Body Fat Loss:  Unable to assess     Muscle Mass Loss:  Unable to assess    Fluid Accumulation:  Mild Extremities   Strength:  Not Performed       Nutrition Assessment:    68 yo male admitted for pleural effusion. PMH includes: HTN, anemia, prostate cancer stage IIa, alcohol use disorder, HLD.     (2/18) Follow-up. S/p chest tube placement 2/1 at [facility], removed 2/12. Pt medically stable for discharge, pending insurance auth for SNF per chart review. Noted plans for IV ceftriaxone through 2/28 and PO metronidazole though 2/16 per ID. No updated labs.     RD visited pt at bedside. Pt endorses a good appetite/intake. Pt reports consuming ~75% of breakfast tray this AM. Flowsheets showing that recent intakes have been fluctuating between 26 - 50% and 51-75%. Pt receiving Ensure Plus HP BID, pt reports drinking both ordered ONS. Pt denies nausea/vomiting/diarrhea/constipation. No reported meal preferences at this time. Continue regular, low Na diet with Ensure Plus HP BID. Wt trending up since prior RD assessment, consider fluid related vs bedscale error(184# 2/12, 203# 2/16). Continue to trend weights.     (2/12) Patient not available during attempted visit. Currently ordered a regular diet. Per chart, patient consumed % of 1 documented meal, 51-75% of 8 documented meals, and 26-50% of 6 documented meals since admission. No nausea/emesis, 
FUNCTIONAL STATUS PRIOR TO ADMISSION: Patient was independent and active without use of DME. Walks to grocery store from his apartment almost daily. No falls.     HOME SUPPORT PRIOR TO ADMISSION: The patient lived alone with friends local to provide assistance. Children live out of state per chart review.    Physical Therapy Goals  Initiated 2/5/2025  1.  Patient will move from supine to sit and sit to supine, scoot up and down, and roll side to side in bed with minimal assistance within 7 day(s).    2.  Patient will perform sit to stand with minimal assistance within 7 day(s).  3.  Patient will transfer from bed to chair and chair to bed with minimal assistance using the least restrictive device within 7 day(s).  4.  Patient will ambulate with minimal assistance for 20 feet with the least restrictive device within 7 day(s).   5.  Patient will ascend/descend 2 stairs with bilateral handrail(s) with minimal assistance within 7 day(s).        PHYSICAL THERAPY TREATMENT    Patient: Jose Mc (69 y.o. male)  Date: 2/11/2025  Diagnosis: Pleural effusion [J90] Pleural effusion      Precautions: Fall Risk                    ASSESSMENT:  RN approached this writer to inform that pt is asking to get OOB and walk, pt seen shortly after. Received in bed amenable to therapy session and the plan to progress activity, SpO2 86-89% on 2L (flat in bed), improved to mid 90's w/ upright and post activity.  Pt transitioned to EOB and stood w/ the RW w/ min A x 1-2.  Noted bowel incontinence through his depends, pt unaware.  Assisted pt back to bed, PCT called into the room to assist w/ hygiene and clothing and linen change.  Pt amenable to continue therapy session after despite fatigue w/ hygiene task in the bed.  He was able ambulate a few steps to the chair w/ RW and min A x1-2.  He is SOB, looks tired, too fatigued to walk beyond the chair today d/t activity w/ bowel clean up.  Educated pt in benefit of sitting up for meals.  
Infectious Disease Progress Note      Impression/Plan     69 y.o. male with past medical Hx of prostate cancer stage Iia s/ radiation, history of alcohol use disorder, past smoker who presented to [facility] ED with SOB, admitted for large pleural effusion with cultures growing strep intermedius.     Right pleural effusion/Empyema  Leukocytosis - resolved  Hx alcohol use disorder  Blood cx 12/31 NGTD  S/p chest tube placement 2/1, pleural fluid cx strep intermedius    Suspect aspiration vs pneumonia with secondary bacterial infection    Appreciate thoracic surgery input,  CT chest pending    Continue ceftriaxone 2 g daily, flagyl IV. Will need prolonged course of antibiotics.       D/w Dr. Calero, pt         Anti-infectives:   Ceftriaxone  Flagyl IV    Subjective:   No complaints, breathing better, chest tube with serosanguineous drainage         Today's Date:  February 10, 2025  Date of Admission: 2/4/2025   Referring Physician: Carissa Galarza    Patient is a 69 y.o. male with past medical Hx of prostate cancer stage Iia s/ radiation, history of alcohol use disorder, past smoker who presented to [facility] ED with SOB, admitted for large pleural effusion with cultures growing strep intermedius.     CT chest showed large loculated right pleural effusion s/p chest tube placement on 2/1 with cell count 84670, LD 2563, drainage consistent with empyema. Pleural fluid cx growing strep intermedius. Received azithromycin x 3 days, 1 dose Zosyn, currently on ceftriaxone and flagyl. Transferred to [facility] for cardiothoracic evaluation and underwent fibrinolytic instillation today. Blood cx remained without growth.  He denies any recent sick contacts, but reports coughing prior to admission. Reports drinks wine regularly. Of note presented covered in old feces at the ED and lives alone. Chest tube currently raised on IV pole with purulent output. Mild right chest crepitus noted. Infectious disease services was consulted to assist with 
Infectious Disease Progress Note      Impression/Plan     69 y.o. male with past medical Hx of prostate cancer stage Iia s/ radiation, history of alcohol use disorder, past smoker who [facility] with SOB, admitted for large pleural effusion with cultures growing strep intermedius.     Right pleural effusion/Empyema  Leukocytosis - resolved  Hx alcohol use disorder  Blood cx 12/31 NGTD  S/p chest tube placement 2/1, pleural fluid cx strep intermedius    Suspect aspiration vs pneumonia with secondary bacterial infection    Continue ceftriaxone 2 g daily, flagyl IV.     CT chest with posterior lung abscess. Will need at least 4 weeks of IV antibiotics.   Appreciate thoracic surgery input.       D/w Dr. Calero, pt         Anti-infectives:   Ceftriaxone  Flagyl IV    Subjective:   No complaints, breathing better, chest tube with serosanguineous drainage         Today's Date:  February 11, 2025  Date of Admission: 2/4/2025   Referring Physician: Carissa Michell    Patient is a 69 y.o. male with past medical Hx of prostate cancer stage Iia s/ radiation, history of alcohol use disorder, past smoker who presented to [facility] with SOB, admitted for large pleural effusion with cultures growing strep intermedius.     CT chest showed large loculated right pleural effusion s/p chest tube placement on 2/1 with cell count 33073, LD 2563, drainage consistent with empyema. Pleural fluid cx growing strep intermedius. Received azithromycin x 3 days, 1 dose Zosyn, currently on ceftriaxone and flagyl. Transferred to [facility] for cardiothoracic evaluation and underwent fibrinolytic instillation today. Blood cx remained without growth.  He denies any recent sick contacts, but reports coughing prior to admission. Reports drinks wine regularly. Of note presented covered in old feces at the ED and lives alone. Chest tube currently raised on IV pole with purulent output. Mild right chest crepitus noted. Infectious disease services 
Infectious Disease Progress Note    INFECTIOUS DISEASE Attending:     I agree with the above infectious disease daily progress note in its entirety as authored by and discussed in detail with the nurse practitioner.   I have reviewed pertinent laboratory studies, microbiology cultures, radiologic reports with review of the consultations and progress notes as appropriate.   I agree with today's subjective findings, physical examination, assessment and plan of care as described above and discussed extensively with the nurse practitioner.       Afebrile, feels well, eager to have CT removed    Exam: NAD, pleasant, supple neck, anicteric sclerae, normal heart rate, less subQ emphysema right chest wall, CT with less output    Plan:    Ceftriaxone for 4 weeks and PO Flagyl for 2 weeks when ready for discharge, see plan of care note.    Will sign off, please call with questions.    A total time of 20 minutes was spent on today's encounter.  Greater than 50% of the time was spent on the following:  Preparing for visit and chart review.  Obtaining and/or reviewing separately obtained history  Performing a medically appropriate exam and/or evaluation  Counseling and educating a patient/family/caregiver as noted above  Placing relevant orders  Referring and communicating with other professionals (not separately reported)  Independently interpreting results (not separately reported) and communicating results to the patient/family/caregiver  Care coordination (not separately reported) as noted above  Documenting clinical information in the electronic health records (e.g. problem list, visit note) on the day of the encounter      Impression/Plan     69 y.o. male with past medical Hx of prostate cancer stage Iia s/ radiation, history of alcohol use disorder, past smoker who presented to [facility] ED with SOB, admitted for large pleural effusion with cultures growing strep intermedius.     Right pleural 
Infectious Disease Progress Note    INFECTIOUS DISEASE Attending:     I agree with the above infectious disease daily progress note in its entirety as authored by and discussed in detail with the nurse practitioner.   I have reviewed pertinent laboratory studies, microbiology cultures, radiologic reports with review of the consultations and progress notes as appropriate.   I agree with today's subjective findings, physical examination, assessment and plan of care as described above and discussed extensively with the nurse practitioner.       Afebrile, receiving tPA, no new chest complaints, has not used incentive spirometer yet.    Exam: NAD, supple neck, anicteric sclerae, + right sided crackles on insp and subQ emphysema RCW    Plan:    Continue Ceftriaxone and Flagyl.    Appreciate input from thoracic surgery and chest tube management.    Will need prolonged antibiotic course.    Encourage ambulation, incentive spirometry.    A total time of 20 minutes was spent on today's encounter.  Greater than 50% of the time was spent on the following:  Preparing for visit and chart review.  Obtaining and/or reviewing separately obtained history  Performing a medically appropriate exam and/or evaluation  Counseling and educating a patient/family/caregiver as noted above  Placing relevant orders  Referring and communicating with other professionals (not separately reported)  Independently interpreting results (not separately reported) and communicating results to the patient/family/caregiver  Care coordination (not separately reported) as noted above  Documenting clinical information in the electronic health records (e.g. problem list, visit note) on the day of the encounter      Impression/Plan     69 y.o. male with past medical Hx of prostate cancer stage Iia s/ radiation, history of alcohol use disorder, past smoker who presented to [facility] ED with SOB, admitted for large pleural effusion with cultures growing strep 
Infectious Disease Progress Note    Impression/Plan     69 y.o. male with past medical Hx of prostate cancer stage Iia s/ radiation, history of alcohol use disorder, past smoker who presented to [facility] with SOB, admitted for large pleural effusion with cultures growing strep intermedius.     Right pleural effusion/Empyema  Leukocytosis - resolved  Hx alcohol use disorder  Blood cx 12/31 NGTD    Continue Ceftriaxone and Flagyl.    OOB, incentive spirometry, diet and ambulation as tolerated    Appreciate thoracic surgery management of chest tube           Anti-infectives:   Ceftriaxone  Flagyl IV    Subjective:   Largely unchanged, using incentive spirometer but did not get OOB, remains quite weak         Today's Date:  February 8, 2025  Date of Admission: 2/4/2025   Referring Physician: Carissa Galarza    Patient is a 69 y.o. male with past medical Hx of prostate cancer stage Iia s/ radiation, history of alcohol use disorder, past smoker who presented to [facility] with SOB, admitted for large pleural effusion with cultures growing strep intermedius.     CT chest showed large loculated right pleural effusion s/p chest tube placement on 2/1 with cell count 32553, LD 2563, drainage consistent with empyema. Pleural fluid cx growing strep intermedius. Received azithromycin x 3 days, 1 dose Zosyn, currently on ceftriaxone and flagyl. Transferred to [facility] for cardiothoracic evaluation and underwent fibrinolytic instillation today. Blood cx remained without growth.  He denies any recent sick contacts, but reports coughing prior to admission. Reports drinks wine regularly. Of note presented covered in old feces at the ED and lives alone. Chest tube currently raised on IV pole with purulent output. Mild right chest crepitus noted. Infectious disease services was consulted to assist with management of empyema.      Patient Active Problem List   Diagnosis    Primary hypertension    Normocytic anemia    Tremor    Mixed 
Occupational Therapy  02/05/25    Orders received, chart reviewed and patient evaluated by occupational therapy. Pending progression with skilled acute occupational therapy, recommend:    High intensity/comprehensive skilled occupational therapy in a multidisciplinary setting as patient is working towards tolerating up to 3 hours of therapy/day 5-7x/week    Recommend with nursing patient to complete as able in order to maintain strength, endurance and independence: OOB to chair 3x/day, ADLs with assist and performing toileting with rolling R/L in bed level assist. Thank you for your assistance.     Full evaluation to follow.   Mckenzie White, OT    
Occupational Therapy  02/07/25    Chart reviewed and discussed with RN. Pt resting -160 bpm. Not appropriate for therapy at this time. Will defer and follow up as able and appropriate.    Thank you!  Mckenzie White OT    
Occupational Therapy  02/10/25    Chart reviewed and discussed with RN. Pt is currently off the floor at CT. Will defer and follow up as able and appropriate.    Thank you!  Mckenzie White, OT    
PICC order noted. Pt dc date is not yet confirmed. Please call [phone] if patients discharge plan gets finalized.  
Per RN, PICC will be placed in am prior to discharge.  
Physical Therapy  2/7/2025    Chart reviewed. Attempted to see patient for PT session. Spoke with RN who advised to hold at this time, as resting 's-150's. Noted SpO2 86-87% on monitor at RN station. In to see patient to titrate O2, cannula in one nostril. Replaced cannula and titrated to 5L/min, HR up to 158 bpm while PT present monitoring O2. RN aware. Hold PT and continue to follow. Thank you.    Marcia Castellon, PT, DPT, CCS  
Physical therapy services attempted 11:00am. Pt received in bed and initially amenable to therapy services. After extensive room set up/line management detangling for attempted EOB/OOB, Pt indicated, \"I don't want to move. I know I said I would do it, but I don't really want to try to sit up if that's okay\". Compassionate listening and increased Pt education re importance of attempted OOB/GT toward continued recovery, decreased debility risk and dc planning ramonita per reported goal to dc to IPR. Pt acknowledged his understanding, but continued to decline. MD Team arrived and reinforced importance of therapy services, but Pt still decline at this time. PT Team will try to provide skilled services at a later date as time permits and as medically appropriate to patient tolerance.    RN Team aware.    Latonya Pradhan, PT, DPT    
Rounded on Jewish patients and provided Anointing of the Sick at request of patient/family.  
Rounded on Muslim patients and provided Anointing of the Sick at request of patient/family.  
Spiritual Health History and Assessment/Progress Note  [facility]    Rituals, Rites and Sacraments,  ,  ,      Name: Jose Mc MRN: [id number]    Age: 69 y.o.     Sex: male   Language: English   Amish: Latter-day   Pleural effusion     Date: 2/17/2025            Total Time Calculated: 5 min              Spiritual Assessment continued in [address] ONCOLOGY        Referral/Consult From: Clergy/   Encounter Overview/Reason: Rituals, Rites and Sacraments  Service Provided For: Patient    Bree, Belief, Meaning:   Patient is connected with a bree tradition or spiritual practice  Family/Friends No family/friends present      Importance and Influence:  Patient has spiritual/personal beliefs that influence decisions regarding their health  Family/Friends No family/friends present    Community:  Patient is connected with a spiritual community  Family/Friends No family/friends present    Assessment and Plan of Care:     Patient Interventions include: Provided sacramental/Islam ritual  Family/Friends Interventions include: No family/friends present    Patient Plan of Care: Spiritual Care available upon further referral  Family/Friends Plan of Care: Spiritual Care available upon further referral    Electronically signed by Chaplain THERESA on 2/17/2025 at 1:13 PM     [employer] visit attempted.  Mr. Mc was asleep and no family was present at the time of the visit. Prayer for spiritual communion offered for his well-being.     Sr. JUNIOR Helms, RN, ACSW, LCSW   Page:  [phone]  
Spiritual Health History and Assessment/Progress Note  [facility]    Rituals, Rites and Sacraments,  ,  ,      Name: Jose Mc MRN: [id number]    Age: 69 y.o.     Sex: male   Language: English   Spiritism: Tenriism   Pleural effusion     Date: 2/12/2025            Total Time Calculated: 5 min              Spiritual Assessment continued in [facility] 4W TELEMETRY        Referral/Consult From: Clergy/   Encounter Overview/Reason: Rituals, Rites and Sacraments  Service Provided For: Patient    Bree, Belief, Meaning:   Patient is connected with a bree tradition or spiritual practice  Family/Friends No family/friends present      Importance and Influence:  Patient unable to assess at this time  Family/Friends No family/friends present    Community:  Patient is connected with a spiritual community  Family/Friends No family/friends present    Assessment and Plan of Care:     Patient Interventions include: Provided sacramental/Rastafarian ritual  Family/Friends Interventions include: No family/friends present    Patient Plan of Care: Spiritual Care available upon further referral  Family/Friends Plan of Care: Spiritual Care available upon further referral    Electronically signed by Chaplain THERESA on 2/12/2025 at 2:28 PM     Whotever visit attempted.  Mr. Mc was being attended to by the staff. Prayer for spiritual communion offered outside his room.     Sr. JUNIOR Helms RN, ACSW, [facility]   Page:  [phone]  
Spiritual Health History and Assessment/Progress Note  [facility]    Rituals, Rites and Sacraments,  ,  ,      Name: Jose Mc MRN: [id number]    Age: 69 y.o.     Sex: male   Language: English   Anabaptism: Scientology   Pleural effusion     Date: 2/5/2025            Total Time Calculated: 5 min              Spiritual Assessment continued in [facility] 4W TELEMETRY        Referral/Consult From: Clergy/   Encounter Overview/Reason: Rituals, Rites and Sacraments  Service Provided For: Patient    Bree, Belief, Meaning:   Patient is connected with a bree tradition or spiritual practice  Family/Friends No family/friends present      Importance and Influence:  Patient unable to assess at this time  Family/Friends No family/friends present    Community:  Patient Other: unknown  Family/Friends No family/friends present    Assessment and Plan of Care:     Patient Interventions include: Provided sacramental/Denominational ritual  Family/Friends Interventions include: No family/friends present    Patient Plan of Care: No future visits per patient/family request  Family/Friends Plan of Care: Spiritual Care available upon further referral    Electronically signed by Chaplain THERESA on 2/5/2025 at 3:15 PM     [facility] visit attempted. Mr. Mc is Scientology. He was asleep and no family was present.  Prayer for spiritual communion offered.     Sr. JUNIOR Helms, RN, ACSW, LCSW   Page:  [phone]  
Spiritual Health History and Assessment/Progress Note  [facility]    Rituals, Rites and Sacraments,  ,  ,      Name: Jose Mc MRN: [id number]    Age: 69 y.o.     Sex: male   Language: English   Buddhism: Orthodoxy   Pleural effusion     Date: 2/10/2025            Total Time Calculated: 5 min              Spiritual Assessment began in [facility] 4W TELEMETRY        Referral/Consult From: Clergy/   Encounter Overview/Reason: Rituals, Rites and Sacraments  Service Provided For: Patient    Bree, Belief, Meaning:   Patient is connected with a bree tradition or spiritual practice  Family/Friends No family/friends present      Importance and Influence:  Patient unable to assess at this time  Family/Friends No family/friends present    Community:  Patient is connected with a spiritual community  Family/Friends No family/friends present    Assessment and Plan of Care:     Patient Interventions include: Provided sacramental/Spiritism ritual  Family/Friends Interventions include: No family/friends present    Patient Plan of Care: Spiritual Care available upon further referral  Family/Friends Plan of Care: Spiritual Care available upon further referral    Electronically signed by Chaplain THERESA on 2/10/2025 at 12:32 PM     [facility] attempted.  Mr. Mc is Orthodoxy. He was not in his room at the time of the visit.  Prayer for spiritual communion offered for his well-being.     Sr. JUNIOR Helms, RN, ACSW, LCSW   Page:  [phone]  
TRANSFER - OUT REPORT:    Verbal report given to Jolynn on Jose Mc  being transferred to [address] [place] [address] for routine progression of patient care       Report consisted of patient's Situation, Background, Assessment and   Recommendations(SBAR).     Information from the following report(s) Nurse Handoff Report, Index, ED Encounter Summary, ED SBAR, Adult Overview, Surgery Report, Intake/Output, MAR, Recent Results, Med Rec Status, Cardiac Rhythm NSR, Alarm Parameters, Pre Procedure Checklist, Procedure Verification, Quality Measures, Neuro Assessment, and Event Log was reviewed with the receiving nurse.           Lines:   Extended Dwell Peripherial IV 01/31/25 Right Cephalic (Active)   Criteria for Appropriate Use Limited/no vessel suitable for conventional peripheral access 02/17/25 0030   Site Assessment Clean, dry & intact 02/17/25 0030   Phlebitis Assessment No symptoms 02/17/25 0030   Infiltration Assessment 0 02/17/25 0030   Line Status Capped;Normal saline locked 02/17/25 0030   Line Care Connections checked and tightened 02/17/25 0030   Alcohol Cap Used Yes 02/17/25 0030   Date of Last Dressing Change 01/31/25 02/10/25 0400   Dressing Type Transparent 02/17/25 0030   Dressing Status Clean, dry & intact 02/17/25 0030   Dressing Intervention New 01/31/25 2033        Opportunity for questions and clarification was provided.      Patient transported with:  Telemetry Box and hospital transport       
Thoracic Surgery Progress Note:    Date: 2/10/2025    Jose Mc is a 69 y.o. male with right empyema with strep intermedius. Underwent drainage with tube thoracostomy with initially 1300 out. Had one does of tpa/dornase at [facility], with 500 out afterwards. Then developed intermittent airleak. CT Chest repeat demonstrates significant parenchymal disease small loculated pleural effusion; along with chronic PE this likely explains his acute hypoxic respiratory failure more so than the empyema. 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     No acute events overnight. Pain well controlled on current regimen. Down to 4L baseline.  Denies shortness of breath, states he feels good today.    O:  BP (!) 149/68   Pulse 78   Temp 97.4 °F (36.3 °C) (Axillary)   Resp 24   Wt 92.5 kg (203 lb 14.8 oz)   SpO2 95%   BMI 28.44 kg/m²   Gen: in no acute distress, awake, alert  CV: RRR  Resp: nl WOB on nasal cannula  Chest: Chest tube with 135 mL drainage in last 24 hr. Flushed proximally and distally without significant resistance..  No airleak with cough or valsalva maneuvers.  Character serosanguineous.  Abd: soft, nontender, no distension  Ext: warm    Labs: I reviewed his labs from 2/10/2025 which include BMP, and CBC, hemoglobin 7.4 this morning.  White count 6.6    AM CXR: No new xray.   CT chest pending    A/P:  Jose Mc is a 69 y.o. male with right empyema with Strep intermedius and pneumonia, chronic PE with acute hypoxic respiratory failure requiring oxygen at 5LNC continuous, and heparin gtt. Have had good output from fibrinolytics with 3 doses. Last CT demonstrated good resolution of the loculated pleural effusion. He had decent lung expansion. The issue continues to be due to the significant parenchymal disease from the pneumonia. He continues to be significantly dehabilitated and weak in his 
Thoracic Surgery Progress Note:    Date: 2/13/2025    Jose Mc is a 69 y.o. male with right empyema with strep intermedius. Underwent drainage with tube thoracostomy with initially 1300 out. Had one does of tpa/dornase at [facility], with 500 out afterwards. Then developed intermittent airleak. CT Chest repeat demonstrates significant parenchymal disease small loculated pleural effusion; along with chronic PE this likely explains his acute hypoxic respiratory failure more so than the empyema. 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     No acute events overnight.  Denies pain.  Still on 2 L of O2.  Chest tube removed yesterday.    O:  /80   Pulse 93   Temp 97.7 °F (36.5 °C) (Oral)   Resp 18   Ht 1.803 m (5' 10.98\")   Wt 92.1 kg (203 lb 1.6 oz)   SpO2 96%   BMI 28.34 kg/m²   Gen: in no acute distress, awake, alert  CV: RRR  Resp: nl WOB on 2L nasal cannula  Chest: No chest tube  Abd: soft, nontender, no distension  Ext: warm    Labs: CBC, BMP reviewed without concern.  WBC 6.7, Hgb 7.7.    AM CXR: No pneumothorax post pull chest x-ray.  No significant change.    A/P:  Jose Mc is a 69 y.o. male with right empyema with Strep intermedius and pneumonia, chronic PE with acute hypoxic respiratory failure requiring oxygen at 5LNC continuous, and heparin gtt. Have had good output from fibrinolytics with 3 doses.  Volume from pigtail decreased and was no longer purulent.  Chest tube removed yesterday.  CT demonstrated continued loculations and posterior collection, but overall improving.  No need for further thoracic surgical intervention.  Patient appears well clinically oxygen requirements decreased from 5 L initially to 2 L now.  No signs of infection, afebrile, no WBC.     Morning chest x-ray with no concern for pneumothorax post pull pigtail  Continue antibiotics per ID with PICC line 
Thoracic Surgery Progress Note:    Date: 2/6/2025    Jose Mc is a 69 y.o. male with right empyema with strep intermedius. Underwent drainage with tube thoracostomy with initially 1300 out. Had one does of tpa/dornase at [facility], with 500 out afterwards. Then developed intermittent airleak. CT Chest repeat demonstrates significant parenchymal disease small loculated pleural effusion; along with chronic PE this likely explains his acute hypoxic respiratory failure more so than the empyema. 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     No acute events overnight. Pain well controlled on current regimen. 5L baseline dyspnea.   470 mL serosang out of the chest tube. Hgb stable 7.5    O:  BP (!) 120/94   Pulse (!) 110   Temp 98.1 °F (36.7 °C) (Oral)   Resp 24   Wt 86.2 kg (190 lb 1.6 oz)   SpO2 93%   BMI 26.51 kg/m²   Gen: in no acute distress, awake, alert  CV: RRR  Resp: nl WOB on RA  Chest: incision c/d/I, no signs of infection. Chest tube with 470 mL drainage in last 24 hr. Air leak present, expiratory. Effluent serosanguinout  Abd: soft, nontender, no distension  Ext: warm    Labs: No leukocytosis, Hgb 7.5. Plt 275. INR 1.5. Anti-XA 0.82    AM CXR: Stable. Pigtail in place. Consolidated right lung.    A/P:  Jose Mc is a 69 y.o. male with right empyema with Strep intermedius and pneumonia, chronic PE with acute hypoxic respiratory failure requiring oxygen at 5LNC continuous, and heparin gtt. Have had good output from fibrinolytics. Today is day 3. 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 He continues to be significantly dehabilitated and weak in his current state. He needs to get out of bed, ambulate, work with PT to improve pulmonary hygiene. His 
Thoracic Surgery Progress Note:    Date: 2/8/2025    Jose Mc is a 69 y.o. male with right empyema with strep intermedius. Underwent drainage with tube thoracostomy with initially 1300 out. Had one does of tpa/dornase at [facility], with 500 out afterwards. Then developed intermittent airleak. CT Chest repeat demonstrates significant parenchymal disease small loculated pleural effusion; along with chronic PE this likely explains his acute hypoxic respiratory failure more so than the empyema. 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     No acute events overnight. Pain well controlled on current regimen. Down to 4L baseline dyspnea.   150 mL serosang out of the chest tube. Hgb 6.8 from 7.2 so getting a blood transfusion this a.m. He reports he is otherwise feeling pretty good.    O:  /88   Pulse 99   Temp 98.9 °F (37.2 °C) (Oral)   Resp 18   Wt 92.5 kg (204 lb)   SpO2 98%   BMI 28.45 kg/m²   Gen: in no acute distress, awake, alert  CV: RRR  Resp: nl WOB on nasal cannula  Chest: Chest tube with 150 mL drainage in last 24 hr. flushed without significant resistance.  Flushed out some small fibrinous material in the distal tubing.  No airleak with cough.  Character serosanguineous.  Abd: soft, nontender, no distension  Ext: warm    Labs: I reviewed his labs from 2/8/2025 which include BMP, and CBC    AM CXR: Personally reviewed today 2/8/2025 and appears stable with pigtail in place.  The appearance looks like effusion, however when comparing with his CT scan from 2/4/25 this is just consolidated lung and not residual effusion.     A/P:  Jose Mc is a 69 y.o. male with right empyema with Strep intermedius and pneumonia, chronic PE with acute hypoxic respiratory failure requiring oxygen at 5LNC continuous, and heparin gtt. Have had good output from fibrinolytics with 3 doses. Last CT 
Thoracic Surgery Progress Note:    Date: 2/9/2025    Jose Mc is a 69 y.o. male with right empyema with strep intermedius. Underwent drainage with tube thoracostomy with initially 1300 out. Had one does of tpa/dornase at [facility], with 500 out afterwards. Then developed intermittent airleak. CT Chest repeat demonstrates significant parenchymal disease small loculated pleural effusion; along with chronic PE this likely explains his acute hypoxic respiratory failure more so than the empyema. 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     No acute events overnight. Pain well controlled on current regimen. Down to 4L baseline.  Says breathing is okay.  Says he hopes the [employer] win the Super Bowl tonight    O:  /85   Pulse 85   Temp 98 °F (36.7 °C) (Oral)   Resp 20   Wt 92.1 kg (203 lb)   SpO2 92%   BMI 28.31 kg/m²   Gen: in no acute distress, awake, alert  CV: RRR  Resp: nl WOB on nasal cannula  Chest: Chest tube with 225 mL drainage in last 24 hr. Flushed proximally and distally without significant resistance..  No airleak with cough or valsalva maneuvers.  Character serosanguineous.  Abd: soft, nontender, no distension  Ext: warm    Labs: I reviewed his labs from 2/9/2025 which include BMP, and CBC, hemoglobin 7 point this morning, was 8.3 after transfusion yesterday, up from 6.8 on morning labs yesterday.  White count 5    AM CXR: No new xray.     A/P:  Jose Mc is a 69 y.o. male with right empyema with Strep intermedius and pneumonia, chronic PE with acute hypoxic respiratory failure requiring oxygen at 5LNC continuous, and heparin gtt. Have had good output from fibrinolytics with 3 doses. Last CT demonstrated good resolution of the loculated pleural effusion. He had decent lung expansion. The issue continues to be due to the significant parenchymal disease from the pneumonia. He 
Thoracic Update:    Reviewed CT scan: has had improvement in right lung aeration. Still continues to have significant lung consolidation likely from pneumonia. There is a posterior loculation vs. Lung abscess. He has minimal pleural fluid. Given his overall improvement with O2 and no signs of infection, recommend treatment for complex empyema/lung abscess per ID. Likely can remove pigtail when output is less than 100 mL/24 hours. He would benefit from PT and improved lung hygiene, however patient has demonstrated resistance with activity. This deconditioning is overall going to impact his recovery.    Jimbo Ruvalcaba MD  Thoracic Surgeon  [facility] Thoracic Surgery at [facility]  [address], [address]  Phone: [phone]  Fax: [phone]    
heparin gtt. Have had good output from fibrinolytics with 3 doses.  CT findings stated above.  Patient's oxygen requirement has decreased from 4 L to 2 L.  No signs of infection, afebrile, no WBC.  Patient pulled 1000 on IS this morning with me.  He continues to be significantly dehabilitated and weak in his current state. He needs to get out of bed, ambulate, work with PT to improve pulmonary hygiene as his deconditioning is overall going to impede his recovery.  Will continue with pigtail until output volume is less than 100 mL in 24 hours.    Continue pigtail to waterseal  Given his overall improvement with O2 and no signs of infection, recommend treatment for complex empyema/lung abscess per ID.   Continue a multimodal pain regimen given pigtail in place.  Completed tPA course 2/7/2025  Wean O2 as tolerated. Continue pulmonary hygiene, IS and walking regimen. PT/OT is working with him, he is severely deconditioned. Needs improved chest physiotherapy to clear pneumonia.   Continue Duonebs , would benefit from EZPAP/IPPV    I personally spent 30 minutes on this encounter. I spent time reviewing and interpreting morning labs, vital signs, output documentation and radiographic studies, reading prior notes, and documenting clinical information in the electronic health record. The majority of time was spent completing a targeted physical exam and evaluation. Orders placed based on findings and discussing with the thoracic surgeon.    MEHREEN Mendez [facility] Thoracic Surgery at [address], [address]  Phone: [phone]  Fax: [phone]     
need for further thoracic surgical intervention.  Patient appears well clinically oxygen requirements decreased from 5 L initially to 2 L now.  No signs of infection, afebrile, no WBC.  He was encouraged to continue incentive spirometer and continue working with PT to improve his pulmonary hygiene and deconditioning.     Pigtail removed this morning  Will follow-up with chest x-ray in the a.m.  Continue antibiotics per ID  Completed tPA course 2/7/2025  Patient will follow-up with Dr. Ruvalcaba in clinic in 4 weeks with repeat chest CT    I personally spent 30 minutes on this encounter. I spent time reviewing and interpreting morning labs, vital signs, output documentation and radiographic studies, reading prior notes, and documenting clinical information in the electronic health record. The majority of time was spent completing a targeted physical exam and evaluation. Orders placed based on findings and discussing with the thoracic surgeon.    MEHREEN Mendez [facility] Thoracic Surgery at [address], [address]  Phone: [phone]  Fax: [phone]     
weak in his current state. He needs to get out of bed, ambulate, work with PT to improve pulmonary hygiene. His ongoing oxygen requirement is from parenchymal disease/pneumonia  and the pulmonary embolisms.    Recommend a multimodal pain regimen given pigtail in place.  3rd administration of 1/2 dose tPA today; drop pleuravac to ground at 13:00. Continue to suction  Do not think he will require any more tPA as already had a good response with first dose. Will re-image  Wean O2 as tolerated. Continue pulmonary hygiene, IS and walking regimen. PT/OT is working with him, he is severely deconditioned. Needs improved chest physiotherapy to clear pneumonia.   Duonebs ordered, would benefit from EZPAP/IPPV  Chest tube to waterseal, needs to be flushed daily  Would recommend reimaging with CT non-con on Monday to determine if ok to remove tube    Discussed care with PT/OT, Infectious Disease/Dr Calero, bedside RN.     I personally spent 36 minutes on this encounter. I spent time preparing for the visit and conducting a comprehensive chart review, and review of outsided faxed records; reviewing and personally interpreting multiple radiographic studies, discussion with referring provider; communication with the patient's care team, and documenting clinical information in the electronic health record. The majority of the time was spent obtaining and/or reviewing separately obtained history; a targeted physical exam and evaluation; face to face counseling of the patient, family, and care providers of the proposed treatment/surgery/plan.      Jimbo Ruvalcaba MD  Thoracic Surgeon  [facility] Thoracic Surgery at [address], [address]  Phone: [phone]  Fax: [phone]     
Exposure: Not on file   Alcohol Use: Not on file   Financial Resource Strain: Not on file   Food Insecurity: Not on file   Transportation Needs: Not on file   Physical Activity: Not on file   Stress: Not on file   Social Connections: Not on file   Intimate Partner Violence: Not on file   Depression: Not at risk (2/16/2024)    PHQ-2     PHQ-2 Score: 0   Housing Stability: Not on file   Interpersonal Safety: Not on file   Utilities: Not on file       Review of Systems:   Pertinent items are noted in HPI.       Vital Signs:    Last 24hrs VS reviewed since prior progress note. Most recent are:  Vitals:    02/16/25 1200   BP:    Pulse: 96   Resp:    Temp:    SpO2:          Intake/Output Summary (Last 24 hours) at 2/16/2025 1352  Last data filed at 2/15/2025 1951  Gross per 24 hour   Intake --   Output 900 ml   Net -900 ml        Physical Examination:     I had a face to face encounter with this patient and independently examined them on 2/16/2025 as outlined below:          General : alert, no acute distress, lying comfortably in bed eating some chicken and peas, O2 via NC in place at 2L,   ENT:   tongue is midline, moist mucous membranes, PERRLA, sclera anicteric   Chest: diminished BS on right base, clear BS anterior bilateral    CVS: S1 S2 heard, 2/6 SM  Abd: soft/ non tender, non distended  Ext: no edema  Neuro/Psych: pleasant mood and affect, MADRID  Skin: warm, no rash      Data Review:    Review and/or order of clinical lab test  Todays CXR    Narrative & Impression  EXAM:  XR CHEST PORTABLE     INDICATION: AM CXR, eval loculated effusion, chest tube position      Comparison to 2/15/2025. Portable exam obtained at 745 demonstrates little  change in the loculated right pleural effusion and underlying infiltrate  compared to prior examination.     IMPRESSION:  No interval change.    Labs:         Recent Labs     02/14/25  0626      K 4.0      CO2 26   BUN 4*         Notes reviewed from all 
per 24 hour   Intake 1619.18 ml   Output 1420 ml   Net 199.18 ml        Physical Examination:     I had a face to face encounter with this patient and independently examined them on 2/6/2025 as outlined below:          General : alert, no acute distress  Chest: R chest tube, slight decrease R breath sounds  CVS: S1 S2 heard, 2/6 SM  Abd: soft/ non tender, non distended  Ext: no edema  Neuro/Psych: pleasant mood and affect, MADRID  Skin: warm     Data Review:    Review and/or order of clinical lab test      I have personally and independently reviewed all pertinent labs, diagnostic studies, imaging, and have provided independent interpretation of the same.     Labs:     Recent Labs     02/05/25 0459 02/06/25 0524   WBC 5.8 4.4   HGB 7.5* 7.5*   HCT 27.9* 25.3*    275     Recent Labs     02/04/25  0118 02/05/25 0459 02/06/25 0524    138 137   K 3.8 3.6 3.2*   * 109* 104   CO2 21 20* 26   BUN 7 7 6     Recent Labs     02/04/25  0118 02/05/25 0459   ALT 7* 8*   GLOB 4.1* 4.0     Recent Labs     02/04/25  1742 02/04/25 2006   INR 1.5* 1.5*   APTT >130.0* >130.0*      Recent Labs     02/03/25  1331   TIBC 87*      No results found for: \"RBCF\"   No results for input(s): \"PH\", \"PCO2\", \"PO2\" in the last 72 hours.  No results for input(s): \"CPK\" in the last 72 hours.    Invalid input(s): \"CPKMB\", \"CKNDX\", \"TROIQ\"  Lab Results   Component Value Date/Time    CHOL 221 02/29/2024 10:23 AM    HDL 61 02/29/2024 10:23 AM     02/29/2024 10:23 AM     No results found for: \"GLUCPOC\"    Notes reviewed from all clinical/nonclinical/nursing services involved in patient's clinical care. Care coordination discussions were held with appropriate clinical/nonclinical/ nursing providers based on care coordination needs.     Patients current active Medications were reviewed, considered, added and adjusted based on the clinical condition today.      Medications Reviewed:     Current Facility-Administered Medications 
interpretation of the same.     Labs:     Recent Labs     02/13/25  0516   WBC 6.7   HGB 7.7*   HCT 25.7*        Recent Labs     02/13/25  0516 02/14/25  0626    138   K 4.1 4.0    105   CO2 27 26   BUN 4* 4*         Notes reviewed from all clinical/nonclinical/nursing services involved in patient's clinical care. Care coordination discussions were held with appropriate clinical/nonclinical/ nursing providers based on care coordination needs.     Patients current active Medications were reviewed, considered, added and adjusted based on the clinical condition today.      Medications Reviewed:     Current Facility-Administered Medications   Medication Dose Route Frequency    metroNIDAZOLE (FLAGYL) tablet 500 mg  500 mg Oral 3 times per day    apixaban (ELIQUIS) tablet 5 mg  5 mg Oral BID    amLODIPine (NORVASC) tablet 10 mg  10 mg Oral Daily    valsartan (DIOVAN) tablet 40 mg  40 mg Oral Daily    cefTRIAXone (ROCEPHIN) 2,000 mg in sterile water 20 mL IV syringe  2,000 mg IntraVENous Q24H    ipratropium 0.5 mg-albuterol 2.5 mg (DUONEB) nebulizer solution 1 Dose  1 Dose Inhalation Q4H PRN    benzonatate (TESSALON) capsule 100 mg  100 mg Oral PRN    0.9 % sodium chloride infusion   IntraVENous PRN    metoprolol tartrate (LOPRESSOR) tablet 25 mg  25 mg Oral BID    balsum peru-castor oil (VENELEX) ointment   Topical BID    sodium chloride flush 0.9 % injection 5-40 mL  5-40 mL IntraVENous 2 times per day    sodium chloride flush 0.9 % injection 5-40 mL  5-40 mL IntraVENous PRN    0.9 % sodium chloride infusion   IntraVENous PRN    ondansetron (ZOFRAN-ODT) disintegrating tablet 4 mg  4 mg Oral Q8H PRN    Or    ondansetron (ZOFRAN) injection 4 mg  4 mg IntraVENous Q6H PRN    polyethylene glycol (GLYCOLAX) packet 17 g  17 g Oral Daily PRN    acetaminophen (TYLENOL) tablet 650 mg  650 mg Oral Q6H PRN    Or    acetaminophen (TYLENOL) suppository 650 mg  650 mg Rectal Q6H PRN    guaiFENesin (MUCINEX) extended 
   0.9 % sodium chloride infusion   IntraVENous PRN    ondansetron (ZOFRAN-ODT) disintegrating tablet 4 mg  4 mg Oral Q8H PRN    Or    ondansetron (ZOFRAN) injection 4 mg  4 mg IntraVENous Q6H PRN    polyethylene glycol (GLYCOLAX) packet 17 g  17 g Oral Daily PRN    acetaminophen (TYLENOL) tablet 650 mg  650 mg Oral Q6H PRN    Or    acetaminophen (TYLENOL) suppository 650 mg  650 mg Rectal Q6H PRN    heparin (porcine) injection 7,300 Units  80 Units/kg IntraVENous PRN    heparin (porcine) injection 3,600 Units  40 Units/kg IntraVENous PRN    heparin 25,000 units in dextrose 5% 250 mL (premix) infusion  5-30 Units/kg/hr IntraVENous Continuous    metroNIDAZOLE (FLAGYL) 500 mg in 0.9% NaCl 100 mL IVPB premix  500 mg IntraVENous Q8H    guaiFENesin (MUCINEX) extended release tablet 600 mg  600 mg Oral BID    morphine (PF) injection 4 mg  4 mg IntraVENous Q4H PRN    benzonatate (TESSALON) capsule 200 mg  200 mg Oral TID    guaiFENesin-codeine (guaiFENesin AC) 100-10 MG/5ML liquid 5 mL  5 mL Oral QHS    pantoprazole (PROTONIX) tablet 40 mg  40 mg Oral QAM AC     ______________________________________________________________________  EXPECTED LENGTH OF STAY: 10  ACTUAL LENGTH OF STAY:          8                 Addison Starks MD    
pleural  effusion and minimal basilar atelectasis  .  ABDOMEN:  Incidentally imaged portions of the abdomen appear unremarkable.  .  MSK: Within normal limits.  .    Impression  1. Right pleural effusion is decreased in size post chest tube placement. There  is now gas and small amount of fluid present in the pleural space. The visceral  pleural is thickened with incomplete distention of the lung.    2. Residual airspace disease throughout the right lung. Uncertain if there are  pneumatoceles or loculated gas within the horizontal and oblique fissures..    Electronically signed by Santosh Blanca    EXAM: CTA CHEST W WO CONTRAST  ACC#: [id number]     INDICATION: Shortness of breath, evaluate for pulmonary embolism, []     COMPARISON: Chest radiograph January 31, 2025     TECHNIQUE: Helical thin section chest CT following uneventful intravenous  administration of nonionic contrast 100 mL of isovue 370 according to  departmental PE protocol. Coronal and sagittal reformats were performed. Coronal  and Sagittal MIP images were generated.  CT dose reduction was achieved through  the use of a standardized protocol tailored for this examination and automatic  exposure control for dose modulation.     FINDINGS: This is a good quality study for the evaluation of pulmonary embolism  to the first subsegmental arterial level. Partially occlusive, eccentric  thrombus is seen within the distal left lower lobe pulmonary artery extending  into lateral basilar and posterior basilar segmental divisions. No right lung  thrombus is seen. The main pulmonary artery is normal in caliber.     MEDIASTINUM: No mass or lymphadenopathy.  JUAN: No mass or lymphadenopathy.  THORACIC AORTA: No aneurysm.  HEART: Normal in size. Coronary vessel atheromatous calcifications are noted.  ESOPHAGUS: No wall thickening or dilatation.  TRACHEA/BRONCHI: Patent.  PLEURA: There is a large right-sided pleural effusion with a peripheral rind.  The effusion

## 2025-02-18 NOTE — PROCEDURES
PROCEDURE NOTE  Date: 2/18/2025   Name: Jose Mc  YOB: 1955    Procedures    Midline Insertion and Progress Note    PRE-PROCEDURE VERIFICATION  Correct Procedure: yes  Correct Site: yes  Temperature: Temp: 97.9 °F (36.6 °C), Temperature Source:    Recent Labs     02/18/25  0253      WBC 7.8     Allergies: Patient has no known allergies.    PROCEDURE DETAIL  A single midline IV catheter was started for Home IV Therapy. The following documentation is in addition to the Midline properties in the lines/airways flowsheet:  Xylocaine 1% used intradermally: Yes  Lot #: [id number]  Catheter to vein ratio: 29%  Catheter Total Length: 12 (cm)  External Catheter Length: 0 (cm)  Circumference at 10 cm above ac: 26 (cm)  Vein Selection for Midline: left brachial  Complication Related to Insertion: none  This line is for non-vesicant/non- irritant IV infusion only.  Line is okay to use.    RN made aware.     TRINIDAD HAMILTON RN

## 2025-02-18 NOTE — DISCHARGE INSTRUCTIONS
Discharge Instructions       PATIENT ID: Jose Mc  MRN: [id number]   YOB: 1955    DATE OF ADMISSION: 2/4/2025   DATE OF DISCHARGE: 2/18/2025    PRIMARY CARE PROVIDER: Lupillo Hobbs     ATTENDING PHYSICIAN: Gutierrez Jaimes MD   DISCHARGING PROVIDER: STEFFANIE Temple NP    To contact this individual call [phone] and ask the  to page.   If unavailable ask to be transferred the Adult Hospitalist Department.    DISCHARGE DIAGNOSES right pleural effusion/empyema    CONSULTATIONS: Thoracic Surgery, Infectious Disease    PROCEDURES/SURGERIES: * No surgery found *    PENDING TEST RESULTS:   At the time of discharge the following test results are still pending: none    FOLLOW UP APPOINTMENTS:    Follow-up Information       Follow up With Specialties Details Why Contact Info    Jimbo Ruvalcaba MD Thoracic Surgery Follow up Office will be calling you to schedule follow up appointment in 4 weeks with repeat chest CT. Please call if you do not hear from them. [address] [facility]   [phone]  [facility] [id number] [phone]      [facility]   [address] [address]  [phone]    Lupillo Hobbs MD Family Medicine Follow up Schedule hospital follow up in 1 week. [address] [address] [id number] [phone]              ADDITIONAL CARE RECOMMENDATIONS:   Schedule follow up appointments as listed above.  You are being discharged with a midline catheter for your IV antibiotic. Take this as prescribed.  If you develop chest pain, shortness of breath, or unusual bleeding, you should be evaluated in the emergency department.     DIET:  low sodium diet  Oral Nutritional Supplements: Ensure Plus2-3 times a day    ACTIVITY: activity as tolerated    WOUND CARE: none    EQUIPMENT needed: none    INFECTIOUS DISEASE discharge plan:     Diagnosis: Empyema     Antibiotic:   ceftriaxone IV 2 g Q 24 hrs

## 2025-02-18 NOTE — PLAN OF CARE
Problem: Chronic Conditions and Co-morbidities  Goal: Patient's chronic conditions and co-morbidity symptoms are monitored and maintained or improved  2/10/2025 0944 by Carlton Larsen RN  Outcome: Progressing  2/10/2025 0457 by Danitza Miguel RN  Outcome: Progressing     Problem: Discharge Planning  Goal: Discharge to home or other facility with appropriate resources  2/10/2025 0944 by Carlton Larsen RN  Outcome: Progressing  2/10/2025 0457 by Danitza Miguel RN  Outcome: Progressing     Problem: Safety - Adult  Goal: Free from fall injury  2/10/2025 0944 by Carlton Larsen RN  Outcome: Progressing  2/10/2025 0457 by Danitza Miguel RN  Outcome: Progressing     Problem: Skin/Tissue Integrity  Goal: Skin integrity remains intact  Description: 1.  Monitor for areas of redness and/or skin breakdown  2.  Assess vascular access sites hourly  3.  Every 4-6 hours minimum:  Change oxygen saturation probe site  4.  Every 4-6 hours:  If on nasal continuous positive airway pressure, respiratory therapy assess nares and determine need for appliance change or resting period  2/10/2025 0944 by Carlton Larsen RN  Outcome: Progressing  2/10/2025 0457 by Danitza Miguel RN  Outcome: Progressing     
  Problem: Chronic Conditions and Co-morbidities  Goal: Patient's chronic conditions and co-morbidity symptoms are monitored and maintained or improved  2/11/2025 1123 by Carlton Larsen RN  Outcome: Progressing  2/11/2025 0819 by Danitza Miguel RN  Outcome: Progressing     Problem: Discharge Planning  Goal: Discharge to home or other facility with appropriate resources  2/11/2025 1123 by Carlton Larsen RN  Outcome: Progressing  2/11/2025 0819 by Danitza Miguel RN  Outcome: Progressing     Problem: Safety - Adult  Goal: Free from fall injury  2/11/2025 1123 by Carlton Larsen RN  Outcome: Progressing  2/11/2025 0819 by Danitza Miguel RN  Outcome: Progressing     Problem: Skin/Tissue Integrity  Goal: Skin integrity remains intact  Description: 1.  Monitor for areas of redness and/or skin breakdown  2.  Assess vascular access sites hourly  3.  Every 4-6 hours minimum:  Change oxygen saturation probe site  4.  Every 4-6 hours:  If on nasal continuous positive airway pressure, respiratory therapy assess nares and determine need for appliance change or resting period  2/11/2025 1123 by Carlton Larsen RN  Outcome: Progressing  2/11/2025 0819 by Danitza Miguel RN  Outcome: Progressing     
  Problem: Chronic Conditions and Co-morbidities  Goal: Patient's chronic conditions and co-morbidity symptoms are monitored and maintained or improved  2/13/2025 0113 by Maryellen Lundberg RN  Outcome: Progressing  Flowsheets (Taken 2/12/2025 2000)  Care Plan - Patient's Chronic Conditions and Co-Morbidity Symptoms are Monitored and Maintained or Improved: Monitor and assess patient's chronic conditions and comorbid symptoms for stability, deterioration, or improvement  2/12/2025 1129 by Karen Kaye RN  Outcome: Progressing  Flowsheets (Taken 2/12/2025 0915)  Care Plan - Patient's Chronic Conditions and Co-Morbidity Symptoms are Monitored and Maintained or Improved: Monitor and assess patient's chronic conditions and comorbid symptoms for stability, deterioration, or improvement     Problem: Discharge Planning  Goal: Discharge to home or other facility with appropriate resources  2/13/2025 0113 by Maryellen Lundberg RN  Outcome: Progressing  Flowsheets (Taken 2/12/2025 2000)  Discharge to home or other facility with appropriate resources: Identify barriers to discharge with patient and caregiver  2/12/2025 1129 by Karen Kaye RN  Outcome: Progressing  Flowsheets (Taken 2/12/2025 0915)  Discharge to home or other facility with appropriate resources: Identify barriers to discharge with patient and caregiver     Problem: Safety - Adult  Goal: Free from fall injury  2/13/2025 0113 by Maryellen Lundberg RN  Outcome: Progressing  Flowsheets (Taken 2/12/2025 2000)  Free From Fall Injury: Instruct family/caregiver on patient safety  2/12/2025 1129 by Karen Kaye RN  Outcome: Progressing     Problem: Skin/Tissue Integrity  Goal: Skin integrity remains intact  Description: 1.  Monitor for areas of redness and/or skin breakdown  2.  Assess vascular access sites hourly  3.  Every 4-6 hours minimum:  Change oxygen saturation probe site  4.  Every 4-6 hours:  If on nasal continuous positive airway pressure, respiratory therapy 
  Problem: Chronic Conditions and Co-morbidities  Goal: Patient's chronic conditions and co-morbidity symptoms are monitored and maintained or improved  2/13/2025 1024 by Carlton Larsen RN  Outcome: Progressing  2/13/2025 0113 by Maryellen Lundberg RN  Outcome: Progressing  Flowsheets (Taken 2/12/2025 2000)  Care Plan - Patient's Chronic Conditions and Co-Morbidity Symptoms are Monitored and Maintained or Improved: Monitor and assess patient's chronic conditions and comorbid symptoms for stability, deterioration, or improvement     Problem: Discharge Planning  Goal: Discharge to home or other facility with appropriate resources  2/13/2025 1024 by Carlton Larsen RN  Outcome: Progressing  2/13/2025 0113 by Maryellen Lundberg RN  Outcome: Progressing  Flowsheets (Taken 2/12/2025 2000)  Discharge to home or other facility with appropriate resources: Identify barriers to discharge with patient and caregiver     Problem: Safety - Adult  Goal: Free from fall injury  2/13/2025 1024 by Carlton Larsen RN  Outcome: Progressing  2/13/2025 0113 by Maryellen Lundberg RN  Outcome: Progressing  Flowsheets (Taken 2/12/2025 2000)  Free From Fall Injury: Instruct family/caregiver on patient safety     Problem: Skin/Tissue Integrity  Goal: Skin integrity remains intact  Description: 1.  Monitor for areas of redness and/or skin breakdown  2.  Assess vascular access sites hourly  3.  Every 4-6 hours minimum:  Change oxygen saturation probe site  4.  Every 4-6 hours:  If on nasal continuous positive airway pressure, respiratory therapy assess nares and determine need for appliance change or resting period  2/13/2025 1024 by Carlton Larsen RN  Outcome: Progressing  2/13/2025 0113 by Maryellen Lundberg RN  Outcome: Progressing  Flowsheets (Taken 2/12/2025 2000)  Skin Integrity Remains Intact: Monitor for areas of redness and/or skin breakdown     Problem: Nutrition Deficit:  Goal: Optimize nutritional status  Outcome: Progressing     
  Problem: Chronic Conditions and Co-morbidities  Goal: Patient's chronic conditions and co-morbidity symptoms are monitored and maintained or improved  2/13/2025 2159 by Maryellen Lundberg RN  Outcome: Progressing  Flowsheets (Taken 2/13/2025 2000)  Care Plan - Patient's Chronic Conditions and Co-Morbidity Symptoms are Monitored and Maintained or Improved: Monitor and assess patient's chronic conditions and comorbid symptoms for stability, deterioration, or improvement  2/13/2025 1024 by Carlton Larsen RN  Outcome: Progressing     Problem: Discharge Planning  Goal: Discharge to home or other facility with appropriate resources  2/13/2025 2159 by Maryellen Lundberg RN  Outcome: Progressing  Flowsheets (Taken 2/13/2025 2000)  Discharge to home or other facility with appropriate resources: Identify barriers to discharge with patient and caregiver  2/13/2025 1024 by Carlton Larsen RN  Outcome: Progressing     Problem: Safety - Adult  Goal: Free from fall injury  2/13/2025 2159 by Maryellen Lundberg RN  Outcome: Progressing  Flowsheets (Taken 2/13/2025 2000)  Free From Fall Injury: Instruct family/caregiver on patient safety  2/13/2025 1024 by Carlton Larsen RN  Outcome: Progressing     Problem: Skin/Tissue Integrity  Goal: Skin integrity remains intact  Description: 1.  Monitor for areas of redness and/or skin breakdown  2.  Assess vascular access sites hourly  3.  Every 4-6 hours minimum:  Change oxygen saturation probe site  4.  Every 4-6 hours:  If on nasal continuous positive airway pressure, respiratory therapy assess nares and determine need for appliance change or resting period  2/13/2025 2159 by Maryellen Lundberg RN  Outcome: Progressing  Flowsheets (Taken 2/13/2025 2000)  Skin Integrity Remains Intact: Monitor for areas of redness and/or skin breakdown  2/13/2025 1024 by Carlton Larsen RN  Outcome: Progressing     Problem: Occupational Therapy - Adult  Goal: By Discharge: Performs self-care activities at highest 
  Problem: Chronic Conditions and Co-morbidities  Goal: Patient's chronic conditions and co-morbidity symptoms are monitored and maintained or improved  2/15/2025 2341 by Marcella Nieves RN  Outcome: Progressing  2/15/2025 1104 by Samantha Ramirez RN  Outcome: Progressing     Problem: Discharge Planning  Goal: Discharge to home or other facility with appropriate resources  2/15/2025 2341 by Marcella Nieves RN  Outcome: Progressing  2/15/2025 1104 by Samantha Ramirez RN  Outcome: Progressing     Problem: Safety - Adult  Goal: Free from fall injury  2/15/2025 2341 by Marcella Nieves RN  Outcome: Progressing  2/15/2025 1104 by Samantha Ramirez RN  Outcome: Progressing     Problem: Skin/Tissue Integrity  Goal: Skin integrity remains intact  Description: 1.  Monitor for areas of redness and/or skin breakdown  2.  Assess vascular access sites hourly  3.  Every 4-6 hours minimum:  Change oxygen saturation probe site  4.  Every 4-6 hours:  If on nasal continuous positive airway pressure, respiratory therapy assess nares and determine need for appliance change or resting period  2/15/2025 2341 by Marcella Nieves RN  Outcome: Progressing  2/15/2025 1104 by Samantha Ramirez RN  Outcome: Progressing  Flowsheets (Taken 2/14/2025 2147 by Josefina Carrillo RN)  Skin Integrity Remains Intact: Monitor for areas of redness and/or skin breakdown     Problem: Nutrition Deficit:  Goal: Optimize nutritional status  2/15/2025 2341 by Marcella Nieves RN  Outcome: Progressing  2/15/2025 1104 by Samantha Ramirez RN  Outcome: Progressing     
  Problem: Chronic Conditions and Co-morbidities  Goal: Patient's chronic conditions and co-morbidity symptoms are monitored and maintained or improved  2/16/2025 1104 by Samantha Ramirez RN  Outcome: Progressing     Problem: Discharge Planning  Goal: Discharge to home or other facility with appropriate resources  2/16/2025 1104 by Samantha Ramirez RN  Outcome: Progressing     Problem: Safety - Adult  Goal: Free from fall injury  2/16/2025 1104 by Samantha Ramirez RN  Outcome: Progressing     Problem: Skin/Tissue Integrity  Goal: Skin integrity remains intact  Description: 1.  Monitor for areas of redness and/or skin breakdown  2.  Assess vascular access sites hourly  3.  Every 4-6 hours minimum:  Change oxygen saturation probe site  4.  Every 4-6 hours:  If on nasal continuous positive airway pressure, respiratory therapy assess nares and determine need for appliance change or resting period  2/16/2025 1104 by Samantha Ramirez RN  Outcome: Progressing     Problem: Nutrition Deficit:  Goal: Optimize nutritional status  2/16/2025 1104 by Samantha Ramirez RN  Outcome: Progressing     
  Problem: Chronic Conditions and Co-morbidities  Goal: Patient's chronic conditions and co-morbidity symptoms are monitored and maintained or improved  2/16/2025 2159 by Maryellen Lundberg RN  Outcome: Progressing  Flowsheets (Taken 2/16/2025 2000)  Care Plan - Patient's Chronic Conditions and Co-Morbidity Symptoms are Monitored and Maintained or Improved: Monitor and assess patient's chronic conditions and comorbid symptoms for stability, deterioration, or improvement  2/16/2025 1104 by Samantha Ramirez RN  Outcome: Progressing     Problem: Discharge Planning  Goal: Discharge to home or other facility with appropriate resources  2/16/2025 2159 by Maryellen Lundberg RN  Outcome: Progressing  Flowsheets (Taken 2/16/2025 2000)  Discharge to home or other facility with appropriate resources: Identify barriers to discharge with patient and caregiver  2/16/2025 1104 by Samantha Ramirez RN  Outcome: Progressing     Problem: Safety - Adult  Goal: Free from fall injury  2/16/2025 2159 by Maryellen Lundberg RN  Outcome: Progressing  Flowsheets (Taken 2/16/2025 2000)  Free From Fall Injury: Instruct family/caregiver on patient safety  2/16/2025 1104 by Samantha Ramirez RN  Outcome: Progressing     Problem: Skin/Tissue Integrity  Goal: Skin integrity remains intact  Description: 1.  Monitor for areas of redness and/or skin breakdown  2.  Assess vascular access sites hourly  3.  Every 4-6 hours minimum:  Change oxygen saturation probe site  4.  Every 4-6 hours:  If on nasal continuous positive airway pressure, respiratory therapy assess nares and determine need for appliance change or resting period  2/16/2025 2159 by Maryellen Lundberg RN  Outcome: Progressing  Flowsheets (Taken 2/16/2025 2000)  Skin Integrity Remains Intact: Monitor for areas of redness and/or skin breakdown  2/16/2025 1104 by Samantha Ramirez RN  Outcome: Progressing     Problem: Nutrition 
  Problem: Chronic Conditions and Co-morbidities  Goal: Patient's chronic conditions and co-morbidity symptoms are monitored and maintained or improved  2/5/2025 0806 by Danitza Miguel RN  Outcome: Progressing  2/4/2025 1923 by Karen Kaye RN  Outcome: Not Progressing  Flowsheets (Taken 2/4/2025 1650)  Care Plan - Patient's Chronic Conditions and Co-Morbidity Symptoms are Monitored and Maintained or Improved: Monitor and assess patient's chronic conditions and comorbid symptoms for stability, deterioration, or improvement     Problem: Chronic Conditions and Co-morbidities  Goal: Patient's chronic conditions and co-morbidity symptoms are monitored and maintained or improved  2/5/2025 0806 by Danitza Miguel RN  Outcome: Progressing  2/4/2025 1923 by Karen Kaye RN  Outcome: Not Progressing  Flowsheets (Taken 2/4/2025 1650)  Care Plan - Patient's Chronic Conditions and Co-Morbidity Symptoms are Monitored and Maintained or Improved: Monitor and assess patient's chronic conditions and comorbid symptoms for stability, deterioration, or improvement     
  Problem: Chronic Conditions and Co-morbidities  Goal: Patient's chronic conditions and co-morbidity symptoms are monitored and maintained or improved  2/5/2025 1024 by Karen Kaye RN  Outcome: Not Progressing  2/5/2025 0806 by Danitza Miguel RN  Outcome: Progressing  Flowsheets (Taken 2/5/2025 0800 by Karen Kaye RN)  Care Plan - Patient's Chronic Conditions and Co-Morbidity Symptoms are Monitored and Maintained or Improved: Monitor and assess patient's chronic conditions and comorbid symptoms for stability, deterioration, or improvement     Problem: Discharge Planning  Goal: Discharge to home or other facility with appropriate resources  2/5/2025 1024 by Karen Kaye RN  Outcome: Progressing  2/5/2025 0806 by Danitza Miguel RN  Outcome: Progressing  Flowsheets (Taken 2/5/2025 0800 by Karen Kaye RN)  Discharge to home or other facility with appropriate resources: Identify barriers to discharge with patient and caregiver     Problem: Safety - Adult  Goal: Free from fall injury  2/5/2025 1024 by Karen Kaye RN  Outcome: Progressing  2/5/2025 0806 by Danitza Miguel RN  Outcome: Progressing     Problem: Skin/Tissue Integrity  Goal: Skin integrity remains intact  Description: 1.  Monitor for areas of redness and/or skin breakdown  2.  Assess vascular access sites hourly  3.  Every 4-6 hours minimum:  Change oxygen saturation probe site  4.  Every 4-6 hours:  If on nasal continuous positive airway pressure, respiratory therapy assess nares and determine need for appliance change or resting period  2/5/2025 1024 by Karen Kaye RN  Outcome: Progressing  2/5/2025 0806 by Danitza Miguel RN  Outcome: Progressing  Flowsheets (Taken 2/5/2025 0800 by Karen Kaye RN)  Skin Integrity Remains Intact: Monitor for areas of redness and/or skin breakdown     Problem: Chronic Conditions and Co-morbidities  Goal: Patient's chronic conditions and co-morbidity symptoms are monitored and maintained or improved  2/5/2025 1024 by 
  Problem: Chronic Conditions and Co-morbidities  Goal: Patient's chronic conditions and co-morbidity symptoms are monitored and maintained or improved  2/6/2025 0023 by Alka Ackerman RN  Outcome: Progressing  Flowsheets (Taken 2/5/2025 2000)  Care Plan - Patient's Chronic Conditions and Co-Morbidity Symptoms are Monitored and Maintained or Improved: Monitor and assess patient's chronic conditions and comorbid symptoms for stability, deterioration, or improvement  2/5/2025 1024 by Karen Kaye RN  Outcome: Not Progressing     Problem: Discharge Planning  Goal: Discharge to home or other facility with appropriate resources  2/6/2025 0023 by Alka Ackerman RN  Outcome: Progressing  Flowsheets (Taken 2/5/2025 2000)  Discharge to home or other facility with appropriate resources: Identify barriers to discharge with patient and caregiver  2/5/2025 1024 by Karen Kaye RN  Outcome: Progressing     Problem: Safety - Adult  Goal: Free from fall injury  2/6/2025 0023 by Alka Ackerman RN  Outcome: Progressing  2/5/2025 1024 by Karen Kaye RN  Outcome: Progressing     Problem: Skin/Tissue Integrity  Goal: Skin integrity remains intact  Description: 1.  Monitor for areas of redness and/or skin breakdown  2.  Assess vascular access sites hourly  3.  Every 4-6 hours minimum:  Change oxygen saturation probe site  4.  Every 4-6 hours:  If on nasal continuous positive airway pressure, respiratory therapy assess nares and determine need for appliance change or resting period  2/6/2025 0023 by Alka Ackerman RN  Outcome: Progressing  Flowsheets (Taken 2/5/2025 2000)  Skin Integrity Remains Intact: Monitor for areas of redness and/or skin breakdown  2/5/2025 1024 by Karen Kaye RN  Outcome: Progressing     Problem: Physical Therapy - Adult  Goal: By Discharge: Performs mobility at highest level of function for planned discharge setting.  See evaluation for individualized goals.  Description: FUNCTIONAL STATUS PRIOR TO 
  Problem: Chronic Conditions and Co-morbidities  Goal: Patient's chronic conditions and co-morbidity symptoms are monitored and maintained or improved  2/6/2025 1125 by Samantha Ramirez RN  Outcome: Progressing     Problem: Discharge Planning  Goal: Discharge to home or other facility with appropriate resources  2/6/2025 1125 by Samantha Ramirez RN  Outcome: Progressing     Problem: Safety - Adult  Goal: Free from fall injury  2/6/2025 1125 by Samantha Ramirez RN  Outcome: Progressing     Problem: Skin/Tissue Integrity  Goal: Skin integrity remains intact  Description: 1.  Monitor for areas of redness and/or skin breakdown  2.  Assess vascular access sites hourly  3.  Every 4-6 hours minimum:  Change oxygen saturation probe site  4.  Every 4-6 hours:  If on nasal continuous positive airway pressure, respiratory therapy assess nares and determine need for appliance change or resting period  2/6/2025 1125 by Samantha Ramirez RN  Outcome: Progressing     
  Problem: Chronic Conditions and Co-morbidities  Goal: Patient's chronic conditions and co-morbidity symptoms are monitored and maintained or improved  2/7/2025 1114 by Samantha Ramirez RN  Outcome: Progressing     Problem: Discharge Planning  Goal: Discharge to home or other facility with appropriate resources  2/7/2025 1114 by Samantha Ramirez RN  Outcome: Progressing     Problem: Safety - Adult  Goal: Free from fall injury  2/7/2025 1114 by Samantha Ramirez RN  Outcome: Progressing     Problem: Skin/Tissue Integrity  Goal: Skin integrity remains intact  Description: 1.  Monitor for areas of redness and/or skin breakdown  2.  Assess vascular access sites hourly  3.  Every 4-6 hours minimum:  Change oxygen saturation probe site  4.  Every 4-6 hours:  If on nasal continuous positive airway pressure, respiratory therapy assess nares and determine need for appliance change or resting period  2/7/2025 1114 by Samantha Ramirez RN  Outcome: Progressing     
  Problem: Chronic Conditions and Co-morbidities  Goal: Patient's chronic conditions and co-morbidity symptoms are monitored and maintained or improved  2/8/2025 1345 by Chas Coreas RN  Outcome: Progressing  2/8/2025 0820 by Danitza Miguel RN  Outcome: Progressing  Flowsheets (Taken 2/8/2025 0800 by Chas Coreas RN)  Care Plan - Patient's Chronic Conditions and Co-Morbidity Symptoms are Monitored and Maintained or Improved: Monitor and assess patient's chronic conditions and comorbid symptoms for stability, deterioration, or improvement     Problem: Discharge Planning  Goal: Discharge to home or other facility with appropriate resources  2/8/2025 1345 by Chas Coreas RN  Outcome: Progressing  2/8/2025 0820 by Danitza Miguel RN  Outcome: Progressing  Flowsheets (Taken 2/8/2025 0800 by Chas Coreas RN)  Discharge to home or other facility with appropriate resources: Identify barriers to discharge with patient and caregiver     Problem: Safety - Adult  Goal: Free from fall injury  2/8/2025 1345 by Chas Coreas RN  Outcome: Progressing  2/8/2025 0820 by Danitza Miguel RN  Outcome: Progressing     Problem: Skin/Tissue Integrity  Goal: Skin integrity remains intact  Description: 1.  Monitor for areas of redness and/or skin breakdown  2.  Assess vascular access sites hourly  3.  Every 4-6 hours minimum:  Change oxygen saturation probe site  4.  Every 4-6 hours:  If on nasal continuous positive airway pressure, respiratory therapy assess nares and determine need for appliance change or resting period  2/8/2025 1345 by Chas Coreas RN  Outcome: Progressing  2/8/2025 0820 by Danitza Miguel RN  Outcome: Progressing  Flowsheets (Taken 2/8/2025 0800 by Chas Coreas RN)  Skin Integrity Remains Intact: Monitor for areas of redness and/or skin breakdown     
  Problem: Chronic Conditions and Co-morbidities  Goal: Patient's chronic conditions and co-morbidity symptoms are monitored and maintained or improved  2/8/2025 2303 by Danitza Miguel RN  Outcome: Progressing  2/8/2025 1345 by Chas Coreas RN  Outcome: Progressing     Problem: Discharge Planning  Goal: Discharge to home or other facility with appropriate resources  2/8/2025 2303 by Danitza Miguel RN  Outcome: Progressing  2/8/2025 1345 by Chas Coreas RN  Outcome: Progressing     Problem: Safety - Adult  Goal: Free from fall injury  2/8/2025 2303 by Danitza Miguel RN  Outcome: Progressing  2/8/2025 1345 by Chas Coreas RN  Outcome: Progressing     Problem: Skin/Tissue Integrity  Goal: Skin integrity remains intact  Description: 1.  Monitor for areas of redness and/or skin breakdown  2.  Assess vascular access sites hourly  3.  Every 4-6 hours minimum:  Change oxygen saturation probe site  4.  Every 4-6 hours:  If on nasal continuous positive airway pressure, respiratory therapy assess nares and determine need for appliance change or resting period  2/8/2025 2303 by Danitza Miguel RN  Outcome: Progressing  2/8/2025 1345 by Chas Coreas RN  Outcome: Progressing     
  Problem: Chronic Conditions and Co-morbidities  Goal: Patient's chronic conditions and co-morbidity symptoms are monitored and maintained or improved  2/9/2025 1047 by Chas Coreas RN  Outcome: Progressing  Flowsheets (Taken 2/9/2025 0800)  Care Plan - Patient's Chronic Conditions and Co-Morbidity Symptoms are Monitored and Maintained or Improved: Monitor and assess patient's chronic conditions and comorbid symptoms for stability, deterioration, or improvement  2/8/2025 2303 by Danitza Miguel RN  Outcome: Progressing     Problem: Discharge Planning  Goal: Discharge to home or other facility with appropriate resources  2/9/2025 1047 by Chas Coreas RN  Outcome: Progressing  Flowsheets (Taken 2/9/2025 0800)  Discharge to home or other facility with appropriate resources: Identify barriers to discharge with patient and caregiver  2/8/2025 2303 by Danitza Miguel RN  Outcome: Progressing     Problem: Safety - Adult  Goal: Free from fall injury  2/9/2025 1047 by Chas Coreas RN  Outcome: Progressing  2/8/2025 2303 by Danitza Miguel RN  Outcome: Progressing     Problem: Skin/Tissue Integrity  Goal: Skin integrity remains intact  Description: 1.  Monitor for areas of redness and/or skin breakdown  2.  Assess vascular access sites hourly  3.  Every 4-6 hours minimum:  Change oxygen saturation probe site  4.  Every 4-6 hours:  If on nasal continuous positive airway pressure, respiratory therapy assess nares and determine need for appliance change or resting period  2/9/2025 1047 by Chas Coreas RN  Outcome: Progressing  Flowsheets (Taken 2/9/2025 0800)  Skin Integrity Remains Intact: Monitor for areas of redness and/or skin breakdown  2/8/2025 2303 by Danitza Miguel RN  Outcome: Progressing     
  Problem: Chronic Conditions and Co-morbidities  Goal: Patient's chronic conditions and co-morbidity symptoms are monitored and maintained or improved  Outcome: Not Progressing  Flowsheets (Taken 2/4/2025 1650)  Care Plan - Patient's Chronic Conditions and Co-Morbidity Symptoms are Monitored and Maintained or Improved: Monitor and assess patient's chronic conditions and comorbid symptoms for stability, deterioration, or improvement     Problem: Discharge Planning  Goal: Discharge to home or other facility with appropriate resources  Outcome: Progressing  Flowsheets (Taken 2/4/2025 1650)  Discharge to home or other facility with appropriate resources: Identify barriers to discharge with patient and caregiver     Problem: Safety - Adult  Goal: Free from fall injury  Outcome: Progressing     Problem: Skin/Tissue Integrity  Goal: Skin integrity remains intact  Description: 1.  Monitor for areas of redness and/or skin breakdown  2.  Assess vascular access sites hourly  3.  Every 4-6 hours minimum:  Change oxygen saturation probe site  4.  Every 4-6 hours:  If on nasal continuous positive airway pressure, respiratory therapy assess nares and determine need for appliance change or resting period  Outcome: Progressing  Flowsheets (Taken 2/4/2025 1650)  Skin Integrity Remains Intact: Monitor for areas of redness and/or skin breakdown     Problem: Chronic Conditions and Co-morbidities  Goal: Patient's chronic conditions and co-morbidity symptoms are monitored and maintained or improved  Outcome: Not Progressing  Flowsheets (Taken 2/4/2025 1650)  Care Plan - Patient's Chronic Conditions and Co-Morbidity Symptoms are Monitored and Maintained or Improved: Monitor and assess patient's chronic conditions and comorbid symptoms for stability, deterioration, or improvement     
  Problem: Chronic Conditions and Co-morbidities  Goal: Patient's chronic conditions and co-morbidity symptoms are monitored and maintained or improved  Outcome: Progressing     Problem: Discharge Planning  Goal: Discharge to home or other facility with appropriate resources  Outcome: Progressing     Problem: Safety - Adult  Goal: Free from fall injury  Outcome: Progressing     Problem: Skin/Tissue Integrity  Goal: Skin integrity remains intact  Description: 1.  Monitor for areas of redness and/or skin breakdown  2.  Assess vascular access sites hourly  3.  Every 4-6 hours minimum:  Change oxygen saturation probe site  4.  Every 4-6 hours:  If on nasal continuous positive airway pressure, respiratory therapy assess nares and determine need for appliance change or resting period  Outcome: Progressing     
  Problem: Chronic Conditions and Co-morbidities  Goal: Patient's chronic conditions and co-morbidity symptoms are monitored and maintained or improved  Outcome: Progressing     Problem: Discharge Planning  Goal: Discharge to home or other facility with appropriate resources  Outcome: Progressing     Problem: Safety - Adult  Goal: Free from fall injury  Outcome: Progressing     Problem: Skin/Tissue Integrity  Goal: Skin integrity remains intact  Description: 1.  Monitor for areas of redness and/or skin breakdown  2.  Assess vascular access sites hourly  3.  Every 4-6 hours minimum:  Change oxygen saturation probe site  4.  Every 4-6 hours:  If on nasal continuous positive airway pressure, respiratory therapy assess nares and determine need for appliance change or resting period  Outcome: Progressing     Problem: Nutrition Deficit:  Goal: Optimize nutritional status  Outcome: Progressing     
  Problem: Chronic Conditions and Co-morbidities  Goal: Patient's chronic conditions and co-morbidity symptoms are monitored and maintained or improved  Outcome: Progressing     Problem: Discharge Planning  Goal: Discharge to home or other facility with appropriate resources  Outcome: Progressing     Problem: Safety - Adult  Goal: Free from fall injury  Outcome: Progressing     Problem: Skin/Tissue Integrity  Goal: Skin integrity remains intact  Description: 1.  Monitor for areas of redness and/or skin breakdown  2.  Assess vascular access sites hourly  3.  Every 4-6 hours minimum:  Change oxygen saturation probe site  4.  Every 4-6 hours:  If on nasal continuous positive airway pressure, respiratory therapy assess nares and determine need for appliance change or resting period  Outcome: Progressing  Flowsheets (Taken 2/14/2025 2147 by Josefina Carrillo, RN)  Skin Integrity Remains Intact: Monitor for areas of redness and/or skin breakdown     Problem: Nutrition Deficit:  Goal: Optimize nutritional status  Outcome: Progressing     
  Problem: Chronic Conditions and Co-morbidities  Goal: Patient's chronic conditions and co-morbidity symptoms are monitored and maintained or improved  Outcome: Progressing  Flowsheets (Taken 2/11/2025 2000)  Care Plan - Patient's Chronic Conditions and Co-Morbidity Symptoms are Monitored and Maintained or Improved: Monitor and assess patient's chronic conditions and comorbid symptoms for stability, deterioration, or improvement     Problem: Discharge Planning  Goal: Discharge to home or other facility with appropriate resources  Outcome: Progressing  Flowsheets (Taken 2/11/2025 2000)  Discharge to home or other facility with appropriate resources: Identify barriers to discharge with patient and caregiver     Problem: Safety - Adult  Goal: Free from fall injury  Outcome: Progressing  Flowsheets (Taken 2/11/2025 2000)  Free From Fall Injury: Instruct family/caregiver on patient safety     Problem: Skin/Tissue Integrity  Goal: Skin integrity remains intact  Description: 1.  Monitor for areas of redness and/or skin breakdown  2.  Assess vascular access sites hourly  3.  Every 4-6 hours minimum:  Change oxygen saturation probe site  4.  Every 4-6 hours:  If on nasal continuous positive airway pressure, respiratory therapy assess nares and determine need for appliance change or resting period  Outcome: Progressing  Flowsheets (Taken 2/11/2025 2000)  Skin Integrity Remains Intact: Monitor for areas of redness and/or skin breakdown     
  Problem: Chronic Conditions and Co-morbidities  Goal: Patient's chronic conditions and co-morbidity symptoms are monitored and maintained or improved  Outcome: Progressing  Flowsheets (Taken 2/14/2025 0800)  Care Plan - Patient's Chronic Conditions and Co-Morbidity Symptoms are Monitored and Maintained or Improved: Monitor and assess patient's chronic conditions and comorbid symptoms for stability, deterioration, or improvement     Problem: Discharge Planning  Goal: Discharge to home or other facility with appropriate resources  Outcome: Progressing  Flowsheets (Taken 2/14/2025 0800)  Discharge to home or other facility with appropriate resources: Identify barriers to discharge with patient and caregiver     Problem: Safety - Adult  Goal: Free from fall injury  Outcome: Progressing     Problem: Skin/Tissue Integrity  Goal: Skin integrity remains intact  Description: 1.  Monitor for areas of redness and/or skin breakdown  2.  Assess vascular access sites hourly  3.  Every 4-6 hours minimum:  Change oxygen saturation probe site  4.  Every 4-6 hours:  If on nasal continuous positive airway pressure, respiratory therapy assess nares and determine need for appliance change or resting period  Outcome: Progressing  Flowsheets (Taken 2/14/2025 0800)  Skin Integrity Remains Intact: Monitor for areas of redness and/or skin breakdown     Problem: Physical Therapy - Adult  Goal: By Discharge: Performs mobility at highest level of function for planned discharge setting.  See evaluation for individualized goals.  Description: FUNCTIONAL STATUS PRIOR TO ADMISSION: Patient was independent and active without use of DME. Walks to grocery store from his apartment almost daily. No falls.     HOME SUPPORT PRIOR TO ADMISSION: The patient lived alone with friends local to provide assistance. Children live out of state per chart review.    Physical Therapy Goals    Revised 2/12/2025  1.  Patient will move from supine to sit and sit to 
  Problem: Chronic Conditions and Co-morbidities  Goal: Patient's chronic conditions and co-morbidity symptoms are monitored and maintained or improved  Outcome: Progressing  Flowsheets (Taken 2/6/2025 2000)  Care Plan - Patient's Chronic Conditions and Co-Morbidity Symptoms are Monitored and Maintained or Improved: Monitor and assess patient's chronic conditions and comorbid symptoms for stability, deterioration, or improvement     Problem: Discharge Planning  Goal: Discharge to home or other facility with appropriate resources  Outcome: Progressing  Flowsheets (Taken 2/6/2025 2000)  Discharge to home or other facility with appropriate resources: Identify barriers to discharge with patient and caregiver     Problem: Safety - Adult  Goal: Free from fall injury  Outcome: Progressing     Problem: Skin/Tissue Integrity  Goal: Skin integrity remains intact  Description: 1.  Monitor for areas of redness and/or skin breakdown  2.  Assess vascular access sites hourly  3.  Every 4-6 hours minimum:  Change oxygen saturation probe site  4.  Every 4-6 hours:  If on nasal continuous positive airway pressure, respiratory therapy assess nares and determine need for appliance change or resting period  Outcome: Progressing  Flowsheets (Taken 2/6/2025 2000)  Skin Integrity Remains Intact: Monitor for areas of redness and/or skin breakdown     Problem: Occupational Therapy - Adult  Goal: By Discharge: Performs self-care activities at highest level of function for planned discharge setting.  See evaluation for individualized goals.  Description: FUNCTIONAL STATUS PRIOR TO ADMISSION:  Pt lives alone in an apartment and is independent at baseline. Pt walks to the grocery store daily for groceries.    , Prior Level of Assist for ADLs: Independent,  ,  ,  ,  ,  , Prior Level of Assist for Homemaking: Independent,  , Prior Level of Assist for Transfers: Independent, Active : No     HOME SUPPORT: Patient lived alone with no additional 
  Problem: Occupational Therapy - Adult  Goal: By Discharge: Performs self-care activities at highest level of function for planned discharge setting.  See evaluation for individualized goals.  Description: FUNCTIONAL STATUS PRIOR TO ADMISSION:  Pt lives alone in an apartment and is independent at baseline. Pt walks to the grocery store daily for groceries.    , Prior Level of Assist for ADLs: Independent,  ,  ,  ,  ,  , Prior Level of Assist for Homemaking: Independent,  , Prior Level of Assist for Transfers: Independent, Active : No     HOME SUPPORT: Patient lived alone with no additional support.    Occupational Therapy Goals:  Weekly Re-Assessment 2/12/25: goals reviewed and updated  Initiated 2/5/2025  1.  Patient will perform grooming standing at sink with Minimal Assist within 7 day(s). CONTINUE  2.  Patient will perform lower body dressing with Moderate Assist within 7 day(s). CONTINUE  3.  Patient will perform UB bathing with Stand by Assist while seated EOB within 7 day(s). MET  4.  Patient will perform toilet transfers with Moderate Assist and Assist x2  within 7 day(s). MET Upgrade to Spv using RW  5.  Patient will perform all aspects of toileting with Moderate Assist within 7 day(s). CONTINUE  6.  Patient will participate in upper extremity therapeutic exercise/activities with Stand by Assist for 5 minutes within 7 day(s).  MET Upgrade to 8 minutes  7.  Patient will utilize energy conservation techniques during functional activities with verbal cues within 7 day(s). CONTINUE    2/17/2025 1523 by Porsche Payton OT  Outcome: Progressing  OCCUPATIONAL THERAPY TREATMENT  Patient: Jose Mc (69 y.o. male)  Date: 2/17/2025  Primary Diagnosis: Pleural effusion [J90]       Precautions: Fall Risk                Chart, occupational therapy assessment, plan of care, and goals were reviewed.    ASSESSMENT  Pt rec'd semi-reclined in bed asleep, agreeable to OT tx upon waking. Patient continues to 
  Problem: Occupational Therapy - Adult  Goal: By Discharge: Performs self-care activities at highest level of function for planned discharge setting.  See evaluation for individualized goals.  Description: FUNCTIONAL STATUS PRIOR TO ADMISSION:  Pt lives alone in an apartment and is independent at baseline. Pt walks to the grocery store daily for groceries.    , Prior Level of Assist for ADLs: Independent,  ,  ,  ,  ,  , Prior Level of Assist for Homemaking: Independent,  , Prior Level of Assist for Transfers: Independent, Active : No     HOME SUPPORT: Patient lived alone with no additional support.    Occupational Therapy Goals:  Weekly Re-Assessment 2/12/25: goals reviewed and updated  Initiated 2/5/2025  1.  Patient will perform grooming standing at sink with Minimal Assist within 7 day(s). CONTINUE  2.  Patient will perform lower body dressing with Moderate Assist within 7 day(s). CONTINUE  3.  Patient will perform UB bathing with Stand by Assist while seated EOB within 7 day(s). MET  4.  Patient will perform toilet transfers with Moderate Assist and Assist x2  within 7 day(s). MET Upgrade to Spv using RW  5.  Patient will perform all aspects of toileting with Moderate Assist within 7 day(s). CONTINUE  6.  Patient will participate in upper extremity therapeutic exercise/activities with Stand by Assist for 5 minutes within 7 day(s).  MET Upgrade to 8 minutes  7.  Patient will utilize energy conservation techniques during functional activities with verbal cues within 7 day(s). CONTINUE    Outcome: Progressing   OCCUPATIONAL THERAPY TREATMENT  Patient: Jose Mc (69 y.o. male)  Date: 2/18/2025  Primary Diagnosis: Pleural effusion [J90]       Precautions: Fall Risk                Chart, occupational therapy assessment, plan of care, and goals were reviewed.    ASSESSMENT  Pt rec'd sitting up in bed, agreeable to OT tx. Patient continues to benefit from skilled OT services and is progressing towards 
  Problem: Physical Therapy - Adult  Goal: By Discharge: Performs mobility at highest level of function for planned discharge setting.  See evaluation for individualized goals.  Description: FUNCTIONAL STATUS PRIOR TO ADMISSION: Patient was independent and active without use of DME. Walks to grocery store from his apartment almost daily. No falls.     HOME SUPPORT PRIOR TO ADMISSION: The patient lived alone with friends local to provide assistance. Children live out of state per chart review.    Physical Therapy Goals    Revised 2/12/2025  1.  Patient will move from supine to sit and sit to supine in bed with supervision/set-up within 7 day(s).    2.  Patient will perform sit to stand with supervision/set-up within 7 day(s).  3.  Patient will transfer from bed to chair and chair to bed with supervision/set-up using the least restrictive device within 7 day(s).  4.  Patient will ambulate with minimal assistance for 75 feet with the least restrictive device within 7 day(s).   5.  Patient will ascend/descend 2 stairs with B handrail(s) with minimal assistance within 7 day(s).     Initiated 2/5/2025  1.  Patient will move from supine to sit and sit to supine, scoot up and down, and roll side to side in bed with minimal assistance within 7 day(s). (MET 2/12)  2.  Patient will perform sit to stand with minimal assistance within 7 day(s). (MET 2/12)  3.  Patient will transfer from bed to chair and chair to bed with minimal assistance using the least restrictive device within 7 day(s). (MET 2/12)  4.  Patient will ambulate with minimal assistance for 20 feet with the least restrictive device within 7 day(s).    5.  Patient will ascend/descend 2 stairs with bilateral handrail(s) with minimal assistance within 7 day(s).    Outcome: Progressing       PHYSICAL THERAPY TREATMENT: WEEKLY REASSESSMENT    Patient: Jose Mc (69 y.o. male)  Date: 2/12/2025  Primary Diagnosis: Pleural effusion [J90]       Precautions: Fall 
  Problem: Physical Therapy - Adult  Goal: By Discharge: Performs mobility at highest level of function for planned discharge setting.  See evaluation for individualized goals.  Description: FUNCTIONAL STATUS PRIOR TO ADMISSION: Patient was independent and active without use of DME. Walks to grocery store from his apartment almost daily. No falls.     HOME SUPPORT PRIOR TO ADMISSION: The patient lived alone with friends local to provide assistance. Children live out of state per chart review.    Physical Therapy Goals    Revised 2/12/2025  1.  Patient will move from supine to sit and sit to supine in bed with supervision/set-up within 7 day(s).    2.  Patient will perform sit to stand with supervision/set-up within 7 day(s).  3.  Patient will transfer from bed to chair and chair to bed with supervision/set-up using the least restrictive device within 7 day(s).  4.  Patient will ambulate with minimal assistance for 75 feet with the least restrictive device within 7 day(s).   5.  Patient will ascend/descend 2 stairs with B handrail(s) with minimal assistance within 7 day(s).     Initiated 2/5/2025  1.  Patient will move from supine to sit and sit to supine, scoot up and down, and roll side to side in bed with minimal assistance within 7 day(s). (MET 2/12)  2.  Patient will perform sit to stand with minimal assistance within 7 day(s). (MET 2/12)  3.  Patient will transfer from bed to chair and chair to bed with minimal assistance using the least restrictive device within 7 day(s). (MET 2/12)  4.  Patient will ambulate with minimal assistance for 20 feet with the least restrictive device within 7 day(s).    5.  Patient will ascend/descend 2 stairs with bilateral handrail(s) with minimal assistance within 7 day(s).    Outcome: Progressing     PHYSICAL THERAPY TREATMENT    Patient: Jose Mc (69 y.o. male)  Date: 2/18/2025  Diagnosis: Pleural effusion [J90] Pleural effusion      Precautions: Fall Risk 
  Problem: Physical Therapy - Adult  Goal: By Discharge: Performs mobility at highest level of function for planned discharge setting.  See evaluation for individualized goals.  Description: FUNCTIONAL STATUS PRIOR TO ADMISSION: Patient was independent and active without use of DME. Walks to grocery store from his apartment almost daily. No falls.     HOME SUPPORT PRIOR TO ADMISSION: The patient lived alone with friends local to provide assistance. Children live out of state per chart review.    Physical Therapy Goals    Revised 2/12/2025  1.  Patient will move from supine to sit and sit to supine in bed with supervision/set-up within 7 day(s).    2.  Patient will perform sit to stand with supervision/set-up within 7 day(s).  3.  Patient will transfer from bed to chair and chair to bed with supervision/set-up using the least restrictive device within 7 day(s).  4.  Patient will ambulate with minimal assistance for 75 feet with the least restrictive device within 7 day(s).   5.  Patient will ascend/descend 2 stairs with B handrail(s) with minimal assistance within 7 day(s).     Initiated 2/5/2025  1.  Patient will move from supine to sit and sit to supine, scoot up and down, and roll side to side in bed with minimal assistance within 7 day(s). (MET 2/12)  2.  Patient will perform sit to stand with minimal assistance within 7 day(s). (MET 2/12)  3.  Patient will transfer from bed to chair and chair to bed with minimal assistance using the least restrictive device within 7 day(s). (MET 2/12)  4.  Patient will ambulate with minimal assistance for 20 feet with the least restrictive device within 7 day(s).    5.  Patient will ascend/descend 2 stairs with bilateral handrail(s) with minimal assistance within 7 day(s).    Outcome: Progressing    PHYSICAL THERAPY TREATMENT    Patient: Jose Mc (69 y.o. male)  Date: 2/17/2025  Diagnosis: Pleural effusion [J90] Pleural effusion      Precautions: Fall Risk               
  Problem: Physical Therapy - Adult  Goal: By Discharge: Performs mobility at highest level of function for planned discharge setting.  See evaluation for individualized goals.  Description: FUNCTIONAL STATUS PRIOR TO ADMISSION: Patient was independent and active without use of DME. Walks to grocery store from his apartment almost daily. No falls.     HOME SUPPORT PRIOR TO ADMISSION: The patient lived alone with friends local to provide assistance. Children live out of state per chart review.    Physical Therapy Goals    Revised 2/12/2025  1.  Patient will move from supine to sit and sit to supine in bed with supervision/set-up within 7 day(s).    2.  Patient will perform sit to stand with supervision/set-up within 7 day(s).  3.  Patient will transfer from bed to chair and chair to bed with supervision/set-up using the least restrictive device within 7 day(s).  4.  Patient will ambulate with minimal assistance for 75 feet with the least restrictive device within 7 day(s).   5.  Patient will ascend/descend 2 stairs with B handrail(s) with minimal assistance within 7 day(s).     Initiated 2/5/2025  1.  Patient will move from supine to sit and sit to supine, scoot up and down, and roll side to side in bed with minimal assistance within 7 day(s). (MET 2/12)  2.  Patient will perform sit to stand with minimal assistance within 7 day(s). (MET 2/12)  3.  Patient will transfer from bed to chair and chair to bed with minimal assistance using the least restrictive device within 7 day(s). (MET 2/12)  4.  Patient will ambulate with minimal assistance for 20 feet with the least restrictive device within 7 day(s).    5.  Patient will ascend/descend 2 stairs with bilateral handrail(s) with minimal assistance within 7 day(s).    Outcome: Progressing   PHYSICAL THERAPY TREATMENT    Patient: Jose Mc (69 y.o. male)  Date: 2/14/2025  Diagnosis: Pleural effusion [J90] Pleural effusion      Precautions: Fall Risk                
  Problem: Physical Therapy - Adult  Goal: By Discharge: Performs mobility at highest level of function for planned discharge setting.  See evaluation for individualized goals.  Description: FUNCTIONAL STATUS PRIOR TO ADMISSION: Patient was independent and active without use of DME. Walks to grocery store from his apartment almost daily. No falls.     HOME SUPPORT PRIOR TO ADMISSION: The patient lived alone with friends local to provide assistance. Children live out of state per chart review.    Physical Therapy Goals  Initiated 2/5/2025  1.  Patient will move from supine to sit and sit to supine, scoot up and down, and roll side to side in bed with minimal assistance within 7 day(s).    2.  Patient will perform sit to stand with minimal assistance within 7 day(s).  3.  Patient will transfer from bed to chair and chair to bed with minimal assistance using the least restrictive device within 7 day(s).  4.  Patient will ambulate with minimal assistance for 20 feet with the least restrictive device within 7 day(s).   5.  Patient will ascend/descend 2 stairs with bilateral handrail(s) with minimal assistance within 7 day(s).    Outcome: Progressing   PHYSICAL THERAPY EVALUATION    Patient: Jose Mc (69 y.o. male)  Date: 2/5/2025  Primary Diagnosis: Pleural effusion [J90]       Precautions: Restrictions/Precautions:  (R chest tube to suction)      ASSESSMENT :   DEFICITS/IMPAIRMENTS:   The patient is limited by decreased functional mobility, strength, activity tolerance, endurance, coordination, balance, posture following transfer from [facility] for thoracic consult in setting of complex pleural effusion. Seen this morning after tPA administration to chest tube and encouraged to mobilize with therapy despite initial concern from patient that he was too weak to do so. Overall min-mod Ax2 for mobility in setting of deconditioning (not seen by therapy at outside hospital during 5 day stay and reported he did not 
  Problem: Physical Therapy - Adult  Goal: By Discharge: Performs mobility at highest level of function for planned discharge setting.  See evaluation for individualized goals.  Description: FUNCTIONAL STATUS PRIOR TO ADMISSION: Patient was independent and active without use of DME. Walks to grocery store from his apartment almost daily. No falls.     HOME SUPPORT PRIOR TO ADMISSION: The patient lived alone with friends local to provide assistance. Children live out of state per chart review.    Physical Therapy Goals  Initiated 2/5/2025  1.  Patient will move from supine to sit and sit to supine, scoot up and down, and roll side to side in bed with minimal assistance within 7 day(s).    2.  Patient will perform sit to stand with minimal assistance within 7 day(s).  3.  Patient will transfer from bed to chair and chair to bed with minimal assistance using the least restrictive device within 7 day(s).  4.  Patient will ambulate with minimal assistance for 20 feet with the least restrictive device within 7 day(s).   5.  Patient will ascend/descend 2 stairs with bilateral handrail(s) with minimal assistance within 7 day(s).    Outcome: Progressing   PHYSICAL THERAPY TREATMENT    Patient: Jose Mc (69 y.o. male)  Date: 2/6/2025  Diagnosis: Pleural effusion [J90] Pleural effusion      Precautions: Fall Risk                      ASSESSMENT:  Patient continues to benefit from skilled PT services and is progressing towards goals. Patient received supine in bed, on 5L/min, resting HR 's and SpO2 92%. He was agreeable to therapy and able to initiate more with bed mobility this date. Continues to deny pain. Most limited this session by SOB with minimal activity-- supine>sit resulted in desat to 85% and need for titration of O2 to 6L/min. Requested to return to supine due to SOB but able to remain EOB with coaching for PLB and emphasis on slow exhale. Stood with RW (pull to stand) with less assist than yesterday 
INFECTIOUS DISEASE discharge plan:    Diagnosis: Empyema    Antibiotic:   ceftriaxone IV 2 g Q 24 hrs through 2/28/25, inclusive  Metronidazole  mg TID through 2/16/25, inclusive    PICC line/Midline insertion for outpatient antibiotic.     Routine PICC/Ginger/ Portacath Care including PRN catheter flow management    Weekly labs with results fax to # [phone]. Call critical lab values to [phone].   [x] CBC w/diff  [x] BUN/Creatinine  [x] AST, ALT  [] CPK  [] CRP, ESR     Home Health to pull PICC line/Midline after last dose or send to IR for Ginger removal    May send to IR for line evaluation or replacement PRN Phone # [phone]    Follow up with thoracic surgery       
Problem: Occupational Therapy - Adult  Goal: By Discharge: Performs self-care activities at highest level of function for planned discharge setting.  See evaluation for individualized goals.  Description: FUNCTIONAL STATUS PRIOR TO ADMISSION:  Pt lives alone in an apartment and is independent at baseline. Pt walks to the grocery store daily for groceries.    , Prior Level of Assist for ADLs: Independent,  ,  ,  ,  ,  , Prior Level of Assist for Homemaking: Independent,  , Prior Level of Assist for Transfers: Independent, Active : No     HOME SUPPORT: Patient lived alone with no additional support.    Occupational Therapy Goals:  Initiated 2/5/2025  1.  Patient will perform grooming standing at sink with Minimal Assist within 7 day(s).  2.  Patient will perform lower body dressing with Moderate Assist within 7 day(s).  3.  Patient will perform UB bathing with Stand by Assist while seated EOB within 7 day(s).  4.  Patient will perform toilet transfers with Moderate Assist and Assist x2  within 7 day(s).  5.  Patient will perform all aspects of toileting with Moderate Assist within 7 day(s).  6.  Patient will participate in upper extremity therapeutic exercise/activities with Stand by Assist for 5 minutes within 7 day(s).    7.  Patient will utilize energy conservation techniques during functional activities with verbal cues within 7 day(s).    Outcome: Progressing   OCCUPATIONAL THERAPY EVALUATION    Patient: Jose Mc (69 y.o. male)  Date: 2/5/2025  Primary Diagnosis: Pleural effusion [J90]       Precautions: Fall Risk                  ASSESSMENT :  Pt transferred from [facility] for cardiothoracic surgery evaluation. Pt with c/o of SOB with pleural effusion, chronic PE, and acute BLE DVT. The patient is limited by decreased functional mobility, independence in ADLs, high-level IADLs, strength, activity tolerance, endurance, balance.    Based on the impairments listed above Pt is requiring up to totalA with 
Problem: Occupational Therapy - Adult  Goal: By Discharge: Performs self-care activities at highest level of function for planned discharge setting.  See evaluation for individualized goals.  Description: FUNCTIONAL STATUS PRIOR TO ADMISSION:  Pt lives alone in an apartment and is independent at baseline. Pt walks to the grocery store daily for groceries.    , Prior Level of Assist for ADLs: Independent,  ,  ,  ,  ,  , Prior Level of Assist for Homemaking: Independent,  , Prior Level of Assist for Transfers: Independent, Active : No     HOME SUPPORT: Patient lived alone with no additional support.    Occupational Therapy Goals:  Initiated 2/5/2025  1.  Patient will perform grooming standing at sink with Minimal Assist within 7 day(s).  2.  Patient will perform lower body dressing with Moderate Assist within 7 day(s).  3.  Patient will perform UB bathing with Stand by Assist while seated EOB within 7 day(s).  4.  Patient will perform toilet transfers with Moderate Assist and Assist x2  within 7 day(s).  5.  Patient will perform all aspects of toileting with Moderate Assist within 7 day(s).  6.  Patient will participate in upper extremity therapeutic exercise/activities with Stand by Assist for 5 minutes within 7 day(s).    7.  Patient will utilize energy conservation techniques during functional activities with verbal cues within 7 day(s).    Outcome: Progressing   OCCUPATIONAL THERAPY TREATMENT  Patient: Jose Mc (69 y.o. male)  Date: 2/11/2025  Primary Diagnosis: Pleural effusion [J90]       Precautions: Fall Risk                Chart, occupational therapy assessment, plan of care, and goals were reviewed.    ASSESSMENT  Patient continues to benefit from skilled OT services and is progressing towards goals. Pt received seated EOB with PT present. Pt on 2 L O2 with stable sats throughout functional transfer and seated BADLs. Pt remains limited by tachycardia, low activity tolerance, impaired standing 
Problem: Occupational Therapy - Adult  Goal: By Discharge: Performs self-care activities at highest level of function for planned discharge setting.  See evaluation for individualized goals.  Description: FUNCTIONAL STATUS PRIOR TO ADMISSION:  Pt lives alone in an apartment and is independent at baseline. Pt walks to the grocery store daily for groceries.    , Prior Level of Assist for ADLs: Independent,  ,  ,  ,  ,  , Prior Level of Assist for Homemaking: Independent,  , Prior Level of Assist for Transfers: Independent, Active : No     HOME SUPPORT: Patient lived alone with no additional support.    Occupational Therapy Goals:  Initiated 2/5/2025  1.  Patient will perform grooming standing at sink with Minimal Assist within 7 day(s).  2.  Patient will perform lower body dressing with Moderate Assist within 7 day(s).  3.  Patient will perform UB bathing with Stand by Assist while seated EOB within 7 day(s).  4.  Patient will perform toilet transfers with Moderate Assist and Assist x2  within 7 day(s).  5.  Patient will perform all aspects of toileting with Moderate Assist within 7 day(s).  6.  Patient will participate in upper extremity therapeutic exercise/activities with Stand by Assist for 5 minutes within 7 day(s).    7.  Patient will utilize energy conservation techniques during functional activities with verbal cues within 7 day(s).    Outcome: Progressing   OCCUPATIONAL THERAPY TREATMENT  Patient: Jose Mc (69 y.o. male)  Date: 2/6/2025  Primary Diagnosis: Pleural effusion [J90]       Precautions: Fall Risk                Chart, occupational therapy assessment, plan of care, and goals were reviewed.    ASSESSMENT  Patient continues to benefit from skilled OT services and is slowly progressing towards goals. Pt presented self feeding Mod I on 5 L O2 with resting HR ranging between  bpm. Pt tolerated sitting EOB with SBA briefly with O2 decreasing to 85%. Supplemental O2 increased to 6 L O2 
Problem: Occupational Therapy - Adult  Goal: By Discharge: Performs self-care activities at highest level of function for planned discharge setting.  See evaluation for individualized goals.  Description: FUNCTIONAL STATUS PRIOR TO ADMISSION:  Pt lives alone in an apartment and is independent at baseline. Pt walks to the grocery store daily for groceries.    , Prior Level of Assist for ADLs: Independent,  ,  ,  ,  ,  , Prior Level of Assist for Homemaking: Independent,  , Prior Level of Assist for Transfers: Independent, Active : No     HOME SUPPORT: Patient lived alone with no additional support.    Occupational Therapy Goals:  Weekly Re-Assessment 2/12/25: goals reviewed and updated  Initiated 2/5/2025  1.  Patient will perform grooming standing at sink with Minimal Assist within 7 day(s). CONTINUE  2.  Patient will perform lower body dressing with Moderate Assist within 7 day(s). CONTINUE  3.  Patient will perform UB bathing with Stand by Assist while seated EOB within 7 day(s). MET  4.  Patient will perform toilet transfers with Moderate Assist and Assist x2  within 7 day(s). MET Upgrade to Spv using RW  5.  Patient will perform all aspects of toileting with Moderate Assist within 7 day(s). CONTINUE  6.  Patient will participate in upper extremity therapeutic exercise/activities with Stand by Assist for 5 minutes within 7 day(s).  MET Upgrade to 8 minutes  7.  Patient will utilize energy conservation techniques during functional activities with verbal cues within 7 day(s). CONTINUE    Outcome: Progressing   OCCUPATIONAL THERAPY TREATMENT  Patient: Jose Mc (69 y.o. male)  Date: 2/13/2025  Primary Diagnosis: Pleural effusion [J90]       Precautions: Fall Risk                Chart, occupational therapy assessment, plan of care, and goals were reviewed.    ASSESSMENT  Patient continues to benefit from skilled OT services and is progressing towards goals. Pt presented semi-francisco in bed on 2 L O2 with 
Problem: Occupational Therapy - Adult  Goal: By Discharge: Performs self-care activities at highest level of function for planned discharge setting.  See evaluation for individualized goals.  Description: FUNCTIONAL STATUS PRIOR TO ADMISSION:  Pt lives alone in an apartment and is independent at baseline. Pt walks to the grocery store daily for groceries.    , Prior Level of Assist for ADLs: Independent,  ,  ,  ,  ,  , Prior Level of Assist for Homemaking: Independent,  , Prior Level of Assist for Transfers: Independent, Active : No     HOME SUPPORT: Patient lived alone with no additional support.    Occupational Therapy Goals:  Weekly Re-Assessment 2/12/25: goals reviewed and updated  Initiated 2/5/2025  1.  Patient will perform grooming standing at sink with Minimal Assist within 7 day(s). CONTINUE  2.  Patient will perform lower body dressing with Moderate Assist within 7 day(s). CONTINUE  3.  Patient will perform UB bathing with Stand by Assist while seated EOB within 7 day(s). MET  4.  Patient will perform toilet transfers with Moderate Assist and Assist x2  within 7 day(s). MET Upgrade to Spv using RW  5.  Patient will perform all aspects of toileting with Moderate Assist within 7 day(s). CONTINUE  6.  Patient will participate in upper extremity therapeutic exercise/activities with Stand by Assist for 5 minutes within 7 day(s).  MET Upgrade to 8 minutes  7.  Patient will utilize energy conservation techniques during functional activities with verbal cues within 7 day(s). CONTINUE    Outcome: Progressing   OCCUPATIONAL THERAPY TREATMENT  Patient: Jose Mc (69 y.o. male)  Date: 2/14/2025  Primary Diagnosis: Pleural effusion [J90]       Precautions: Fall Risk                Chart, occupational therapy assessment, plan of care, and goals were reviewed.    ASSESSMENT  Patient continues to benefit from skilled OT services and is slowly progressing towards goals. Pt received on 6 L O2 with stable sats 
Problem: Occupational Therapy - Adult  Goal: By Discharge: Performs self-care activities at highest level of function for planned discharge setting.  See evaluation for individualized goals.  Description: FUNCTIONAL STATUS PRIOR TO ADMISSION:  Pt lives alone in an apartment and is independent at baseline. Pt walks to the grocery store daily for groceries.    , Prior Level of Assist for ADLs: Independent,  ,  ,  ,  ,  , Prior Level of Assist for Homemaking: Independent,  , Prior Level of Assist for Transfers: Independent, Active : No     HOME SUPPORT: Patient lived alone with no additional support.    Occupational Therapy Goals:  Weekly Re-Assessment 2/12/25: goals reviewed and updated  Initiated 2/5/2025  1.  Patient will perform grooming standing at sink with Minimal Assist within 7 day(s). CONTINUE  2.  Patient will perform lower body dressing with Moderate Assist within 7 day(s). CONTINUE  3.  Patient will perform UB bathing with Stand by Assist while seated EOB within 7 day(s). MET  4.  Patient will perform toilet transfers with Moderate Assist and Assist x2  within 7 day(s). MET Upgrade to Spv using RW  5.  Patient will perform all aspects of toileting with Moderate Assist within 7 day(s). CONTINUE  6.  Patient will participate in upper extremity therapeutic exercise/activities with Stand by Assist for 5 minutes within 7 day(s).  MET Upgrade to 8 minutes  7.  Patient will utilize energy conservation techniques during functional activities with verbal cues within 7 day(s). CONTINUE    Outcome: Progressing   OCCUPATIONAL THERAPY TREATMENT: WEEKLY REASSESSMENT    Patient: Jose Mc (69 y.o. male)  Date: 2/12/2025  Primary Diagnosis: Pleural effusion [J90]       Precautions: Fall Risk                Chart, occupational therapy assessment, plan of care, and goals were reviewed.    ASSESSMENT  Patient continues to benefit from skilled OT services and is progressing towards goals. Pt received on 2 L O2 
activities at highest level of function for planned discharge setting.  See evaluation for individualized goals.  Description: FUNCTIONAL STATUS PRIOR TO ADMISSION:  Pt lives alone in an apartment and is independent at baseline. Pt walks to the grocery store daily for groceries.    , Prior Level of Assist for ADLs: Independent,  ,  ,  ,  ,  , Prior Level of Assist for Homemaking: Independent,  , Prior Level of Assist for Transfers: Independent, Active : No     HOME SUPPORT: Patient lived alone with no additional support.    Occupational Therapy Goals:  Weekly Re-Assessment 2/12/25: goals reviewed and updated  Initiated 2/5/2025  1.  Patient will perform grooming standing at sink with Minimal Assist within 7 day(s). CONTINUE  2.  Patient will perform lower body dressing with Moderate Assist within 7 day(s). CONTINUE  3.  Patient will perform UB bathing with Stand by Assist while seated EOB within 7 day(s). MET  4.  Patient will perform toilet transfers with Moderate Assist and Assist x2  within 7 day(s). MET Upgrade to Spv using RW  5.  Patient will perform all aspects of toileting with Moderate Assist within 7 day(s). CONTINUE  6.  Patient will participate in upper extremity therapeutic exercise/activities with Stand by Assist for 5 minutes within 7 day(s).  MET Upgrade to 8 minutes  7.  Patient will utilize energy conservation techniques during functional activities with verbal cues within 7 day(s). CONTINUE    2/18/2025 1752 by Porsche Payton OT  Outcome: Progressing     Problem: Chronic Conditions and Co-morbidities  Goal: Patient's chronic conditions and co-morbidity symptoms are monitored and maintained or improved  Outcome: Adequate for Discharge     Problem: Discharge Planning  Goal: Discharge to home or other facility with appropriate resources  Outcome: Adequate for Discharge     Problem: Safety - Adult  Goal: Free from fall injury  Outcome: Adequate for Discharge     Problem: Skin/Tissue 
assess nares and determine need for appliance change or resting period  2/12/2025 1129 by Karen Kaye, RN  Outcome: Not Progressing  Flowsheets (Taken 2/12/2025 0915)  Skin Integrity Remains Intact: Monitor for areas of redness and/or skin breakdown  2/12/2025 0453 by Maryellen Lundberg, RN  Outcome: Progressing  Flowsheets (Taken 2/11/2025 2000)  Skin Integrity Remains Intact: Monitor for areas of redness and/or skin breakdown

## 2025-02-18 NOTE — DISCHARGE SUMMARY
Discharge Summary       PATIENT ID: Jose Mc  MRN: [id number]   YOB: 1955    DATE OF ADMISSION: 2/4/2025  4:35 PM    DATE OF DISCHARGE: 2/18/2025   PRIMARY CARE PROVIDER: Lupillo Hobbs MD     ATTENDING PHYSICIAN: Dr. Jaimes  DISCHARGING PROVIDER: STEFFANIE Temple NP    To contact this individual call [phone] and ask the  to page.  If unavailable ask to be transferred the Adult Hospitalist Department.    CONSULTATIONS: IP CONSULT TO THORACIC SURGERY  IP CONSULT TO INFECTIOUS DISEASES  IP CONSULT TO VASCULAR ACCESS TEAM    PROCEDURES/SURGERIES: * No surgery found *     ADMITTING DIAGNOSES & HOSPITAL COURSE:   Per H&P: Jose Mc is a 69 y.o. male who presents with transfer from [facility] .due to large pleural effusion        \"Jose cM is a 69 y.o. male with known history of hypertension, anemia, prostate cancer stage IIa, history of alcohol use disorder, HLD who presents to the [facility]  ER 1/31 complaining of shortness of breath and 4 days worsening shortness of breath and cough.    Denies history of COPD.   Has remote 15-pack-year smoking history.  He was tx as pna and found to have large right side pleural effusion. S/p chest tube placement with IR 2/1. CTA with large loculated effusion of the R lung (15 x 12 x 22cm). Fluid culture with moderate alpha streptococcus, streptococcus intermedius.  S/p intrapleural lytics 2/2. Also developed Subcutaneous emphysema after chest tube. He was transferred to [facility] for thoracic surgery  consult.   Also has Chronic pulmonary emboli  Acute bilateral LE DVT , he was treated with Heparin gtt on admission and then transitioned to Eliquis.on 2/14 2/18: Medically stable for discharge to SNF. Midline placed prior to discharge for ongoing IV antibiotics.     DISCHARGE DIAGNOSES / PLAN:      Large right pleural effusion/empyema  -Pleural effusion culture: MODERATE Streptococcus intermedius   -S/p placement of chest tube 2/1 (at

## 2025-02-18 NOTE — CARE COORDINATION
Transition of Care: recs are now for SNF- patient has chosen [facility]- they have accepted; Room #116A ;patient will need insurance auth (has [facility] medicare- the facility started the auth on 2/17) insurance was authorized on 2/18        NOTE: patient will need IV antibiotics until 2/28/25 via a midline- both the midline report and the ID order uploaded to [facility]          Transport Plan: H2H stretcher with O2- capacity black, timely discharge to SNF; no  stretchers available; $330 pt is currently using oxygen 1-2 L; scheduled for 6pm     RUR: 15%     Main contacts are friend- Lanny Armstrong- [phone] 0930: this CM called and sent message via careport to  [facility]; insurance auth has been started and it is still pending          1500: this CM was informed by [facility] (rae) that they got insurance auth today 2/18; this CM updated the attending; pt still needs midline placement prior to discharge (order for midline has been placed today 2/18)     1632: this CM uploaded to [facility] the ID order and the discharge summary and spoke again to [place] to confirm     1644: this CM uploaded the midline report to [facility]    1650: this CM faxed to [facility] pharmacy the IV antibiotic order to [phone]- per [facility] request    1657: this CM uploaded the AVS to [facility]    1700: this CM updated the pt at bedside on discharge today to [facility] by H2H stretcher at 6pm; he verbalized understanding      Transition of Care Plan to SNF/Rehab    Communication to Patient/Family:  Met with patient and family and they are agreeable to the transition plan. The Plan for Transition of Care is related to the following treatment goals: SNF    The Patient and/or patient representative was provided with a choice of provider and agrees  with the discharge plan.      Yes [x] No []    A Freedom of choice list was provided with basic dialogue that supports

## 2025-02-19 NOTE — TELEPHONE ENCOUNTER
2nd attempt to contact patient to schedule office follow up from hospital. 4 weeks from discharge. Left voicemail for a return call.    NP: follow up for empyema

## 2025-02-21 NOTE — TELEPHONE ENCOUNTER
3rd attempt to contact patient to schedule office follow up from hospital. 4 weeks from discharge. Left voicemail for a return call.     NP: follow up for empyema

## 2025-02-24 LAB
BACTERIA SPEC CULT: NORMAL
SERVICE CMNT-IMP: NORMAL

## 2025-03-03 LAB
BACTERIA SPEC CULT: NORMAL
SERVICE CMNT-IMP: NORMAL

## 2025-03-17 DIAGNOSIS — J86.9 EMPYEMA LUNG (HCC): Primary | ICD-10-CM

## 2025-03-18 ENCOUNTER — OFFICE VISIT (OUTPATIENT)
Age: 70
End: 2025-03-18
Payer: MEDICARE

## 2025-03-18 VITALS
RESPIRATION RATE: 12 BRPM | WEIGHT: 169.5 LBS | SYSTOLIC BLOOD PRESSURE: 139 MMHG | TEMPERATURE: 97.5 F | OXYGEN SATURATION: 97 % | HEIGHT: 71 IN | HEART RATE: 64 BPM | BODY MASS INDEX: 23.73 KG/M2 | DIASTOLIC BLOOD PRESSURE: 67 MMHG

## 2025-03-18 DIAGNOSIS — J98.11 COLLAPSE OF RIGHT LUNG: ICD-10-CM

## 2025-03-18 DIAGNOSIS — J90 PLEURAL EFFUSION: Primary | ICD-10-CM

## 2025-03-18 PROCEDURE — 1159F MED LIST DOCD IN RCRD: CPT | Performed by: STUDENT IN AN ORGANIZED HEALTH CARE EDUCATION/TRAINING PROGRAM

## 2025-03-18 PROCEDURE — 1123F ACP DISCUSS/DSCN MKR DOCD: CPT | Performed by: STUDENT IN AN ORGANIZED HEALTH CARE EDUCATION/TRAINING PROGRAM

## 2025-03-18 PROCEDURE — 99213 OFFICE O/P EST LOW 20 MIN: CPT | Performed by: STUDENT IN AN ORGANIZED HEALTH CARE EDUCATION/TRAINING PROGRAM

## 2025-03-18 PROCEDURE — 3075F SYST BP GE 130 - 139MM HG: CPT | Performed by: STUDENT IN AN ORGANIZED HEALTH CARE EDUCATION/TRAINING PROGRAM

## 2025-03-18 PROCEDURE — 3078F DIAST BP <80 MM HG: CPT | Performed by: STUDENT IN AN ORGANIZED HEALTH CARE EDUCATION/TRAINING PROGRAM

## 2025-03-18 PROCEDURE — 1126F AMNT PAIN NOTED NONE PRSNT: CPT | Performed by: STUDENT IN AN ORGANIZED HEALTH CARE EDUCATION/TRAINING PROGRAM

## 2025-03-18 ASSESSMENT — PATIENT HEALTH QUESTIONNAIRE - PHQ9
SUM OF ALL RESPONSES TO PHQ QUESTIONS 1-9: 0
1. LITTLE INTEREST OR PLEASURE IN DOING THINGS: NOT AT ALL
2. FEELING DOWN, DEPRESSED OR HOPELESS: NOT AT ALL

## 2025-03-18 NOTE — PROGRESS NOTES
Thoracic Surgery Clinic Note: Post Hospitalization    Chief Complaint:   Chief Complaint   Patient presents with    New Patient     empyema       HPI: Jose Mc is a 69 y.o. male who presents today as a follow up from his hospitalization. He presented with parapneumonic effusion underwent 3 doses of TPA for fibrinolytic therapy. He was maintained on antibiotics. He presents for follow up. Requested repeat CT scan prior to visit but was not completed. He is currently on 2LNC continuous oxygen supplementation. Feels better. Denies SOB. States breathing is better. Appetite is good. Denies fevers. States strength is improving, but has continued weakness with walking. Still with rehabilitation center.     Physical Exam:  /67 (BP Site: Left Upper Arm, Patient Position: Sitting, BP Cuff Size: Small Adult)   Pulse 64   Temp 97.5 °F (36.4 °C) (Oral)   Resp 12   Ht 1.803 m (5' 10.98\")   Wt 76.9 kg (169 lb 8 oz)   SpO2 97%   BMI 23.65 kg/m²   Gen: appears well, no acute distress; in wheelchair  HEENT: trachea midline   CV: RRR  Resp: normal work of breathing on 2LNC  MSK: skin is warm and dry    Xray Result (most recent): No recent imaging to review.       A/P:   Jose Mc is a 69 y.o. male who was recently admitted for parapneumonic effusion underwent fibrinolyric therapy via pigtail and discharged on antibiotics. Planned for a 4 week interval CT Chest which has not been completed.  Recommend completing CT Chest. Patient is understanding of the recommendations. They had the opportunity to ask questions and comment, and were in understanding of the plan.    Recommendations:    CT Chest without contrast for interval after antibiotics  Will call with results    Jimbo Ruvalcaba MD  Thoracic Surgeon  [facility] Thoracic Surgery at [address], [address]  Phone: [phone]  Fax: [phone]

## 2025-03-18 NOTE — PROGRESS NOTES
Identified patient with two patient identifiers (name and ). Reviewed chart in preparation for visit and have obtained necessary documentation.    Jose Mc is a 69 y.o. male  Chief Complaint   Patient presents with    New Patient     empyema     /67 (BP Site: Left Upper Arm, Patient Position: Sitting, BP Cuff Size: Small Adult)   Pulse 64   Temp 97.5 °F (36.4 °C) (Oral)   Resp 12   Ht 1.803 m (5' 10.98\")   SpO2 97%   BMI 28.37 kg/m²     1. Have you been to the ER, urgent care clinic since your last visit?  Hospitalized since your last visit?no    2. Have you seen or consulted any other health care providers outside of the [facility] since your last visit?  Include any pap smears or colon screening. no

## 2025-03-25 RX ORDER — IOPAMIDOL 755 MG/ML
100 INJECTION, SOLUTION INTRAVASCULAR
Status: COMPLETED | OUTPATIENT
Start: 2025-03-25 | End: 2025-03-26

## 2025-03-26 ENCOUNTER — HOSPITAL ENCOUNTER (OUTPATIENT)
Facility: HOSPITAL | Age: 70
Discharge: HOME | End: 2025-03-29
Attending: STUDENT IN AN ORGANIZED HEALTH CARE EDUCATION/TRAINING PROGRAM
Payer: MEDICARE

## 2025-03-26 DIAGNOSIS — J90 PLEURAL EFFUSION, RIGHT: ICD-10-CM

## 2025-03-26 DIAGNOSIS — J18.9 COMMUNITY ACQUIRED PNEUMONIA OF RIGHT LUNG, UNSPECIFIED PART OF LUNG: ICD-10-CM

## 2025-03-26 DIAGNOSIS — J96.01 ACUTE HYPOXIC RESPIRATORY FAILURE: ICD-10-CM

## 2025-03-26 DIAGNOSIS — J86.9 EMPYEMA LUNG (HCC): ICD-10-CM

## 2025-03-26 DIAGNOSIS — A49.1 STREPTOCOCCUS INFECTION: ICD-10-CM

## 2025-03-26 PROCEDURE — 6360000004 HC RX CONTRAST MEDICATION: Performed by: STUDENT IN AN ORGANIZED HEALTH CARE EDUCATION/TRAINING PROGRAM

## 2025-03-26 PROCEDURE — 71260 CT THORAX DX C+: CPT

## 2025-03-26 RX ADMIN — IOPAMIDOL 80 ML: 755 INJECTION, SOLUTION INTRAVENOUS at 15:16

## 2025-04-03 ENCOUNTER — TRANSCRIBE ORDERS (OUTPATIENT)
Facility: HOSPITAL | Age: 70
End: 2025-04-03

## 2025-04-03 DIAGNOSIS — R19.7 DIARRHEA, UNSPECIFIED TYPE: Primary | ICD-10-CM

## 2025-04-03 DIAGNOSIS — R10.84 ABDOMINAL PAIN, GENERALIZED: ICD-10-CM

## 2025-04-15 ENCOUNTER — HOSPITAL ENCOUNTER (OUTPATIENT)
Facility: HOSPITAL | Age: 70
Discharge: HOME | End: 2025-04-18
Payer: MEDICARE

## 2025-04-15 DIAGNOSIS — R10.84 ABDOMINAL PAIN, GENERALIZED: ICD-10-CM

## 2025-04-15 PROCEDURE — 6360000004 HC RX CONTRAST MEDICATION: Performed by: NURSE PRACTITIONER

## 2025-04-15 PROCEDURE — 74177 CT ABD & PELVIS W/CONTRAST: CPT

## 2025-04-15 RX ORDER — IOPAMIDOL 755 MG/ML
100 INJECTION, SOLUTION INTRAVASCULAR
Status: COMPLETED | OUTPATIENT
Start: 2025-04-15 | End: 2025-04-15

## 2025-04-15 RX ADMIN — IOPAMIDOL 100 ML: 755 INJECTION, SOLUTION INTRAVENOUS at 14:16

## 2025-04-30 ENCOUNTER — TELEPHONE (OUTPATIENT)
Facility: CLINIC | Age: 70
End: 2025-04-30

## 2025-04-30 NOTE — TELEPHONE ENCOUNTER
Sindhu called from [facility] to confirm if Dr. Hobbs will follow pt for home health orders after discharging from the [facility].     Advised provider normally signs off on orders after pt is seen for hospital follow up and pt has not been seen since new pt appointment 2/16/24.     Please call Sindhu at [phone] to update as pt was admitted to [facility] 2/4/25 and discharged to [facility] 2/14/25. Celia

## 2025-05-01 ENCOUNTER — TELEMEDICINE (OUTPATIENT)
Facility: CLINIC | Age: 70
End: 2025-05-01
Payer: MEDICARE

## 2025-05-01 DIAGNOSIS — Z09 HOSPITAL DISCHARGE FOLLOW-UP: Primary | ICD-10-CM

## 2025-05-01 DIAGNOSIS — R53.81 DEBILITY: ICD-10-CM

## 2025-05-01 DIAGNOSIS — I82.5Y9 CHRONIC DEEP VEIN THROMBOSIS (DVT) OF PROXIMAL VEIN OF LOWER EXTREMITY, UNSPECIFIED LATERALITY (HCC): ICD-10-CM

## 2025-05-01 DIAGNOSIS — R53.1 GENERALIZED WEAKNESS: ICD-10-CM

## 2025-05-01 DIAGNOSIS — I27.82 CHRONIC PULMONARY EMBOLISM, UNSPECIFIED PULMONARY EMBOLISM TYPE, UNSPECIFIED WHETHER ACUTE COR PULMONALE PRESENT (HCC): ICD-10-CM

## 2025-05-01 PROBLEM — A49.1 STREPTOCOCCUS INFECTION: Status: RESOLVED | Noted: 2025-02-11 | Resolved: 2025-05-01

## 2025-05-01 PROBLEM — J96.01 ACUTE HYPOXIC RESPIRATORY FAILURE (HCC): Status: RESOLVED | Noted: 2025-02-01 | Resolved: 2025-05-01

## 2025-05-01 PROBLEM — J18.9 COMMUNITY ACQUIRED PNEUMONIA OF RIGHT LUNG, UNSPECIFIED PART OF LUNG: Status: RESOLVED | Noted: 2025-01-31 | Resolved: 2025-05-01

## 2025-05-01 PROCEDURE — 1111F DSCHRG MED/CURRENT MED MERGE: CPT | Performed by: STUDENT IN AN ORGANIZED HEALTH CARE EDUCATION/TRAINING PROGRAM

## 2025-05-01 PROCEDURE — 1123F ACP DISCUSS/DSCN MKR DOCD: CPT | Performed by: STUDENT IN AN ORGANIZED HEALTH CARE EDUCATION/TRAINING PROGRAM

## 2025-05-01 PROCEDURE — 99215 OFFICE O/P EST HI 40 MIN: CPT | Performed by: STUDENT IN AN ORGANIZED HEALTH CARE EDUCATION/TRAINING PROGRAM

## 2025-05-01 RX ORDER — NALTREXONE HYDROCHLORIDE 50 MG/1
50 TABLET, FILM COATED ORAL DAILY
COMMUNITY

## 2025-05-01 SDOH — ECONOMIC STABILITY: FOOD INSECURITY: WITHIN THE PAST 12 MONTHS, THE FOOD YOU BOUGHT JUST DIDN'T LAST AND YOU DIDN'T HAVE MONEY TO GET MORE.: NEVER TRUE

## 2025-05-01 SDOH — ECONOMIC STABILITY: FOOD INSECURITY: WITHIN THE PAST 12 MONTHS, YOU WORRIED THAT YOUR FOOD WOULD RUN OUT BEFORE YOU GOT MONEY TO BUY MORE.: NEVER TRUE

## 2025-05-01 NOTE — PROGRESS NOTES
Post-Discharge Transitional Care Management Progress Note      Jose Mc   YOB: 1955    Date of Office Visit:  5/1/2025  Date of Hospital Admission: 2/18/25  Date of Hospital Discharge: 4/30/25    Care management risk score Rising risk (score 2-5) and Complex Care (Scores >=6): No Risk Score On File     Non face to face  following discharge, date last encounter closed (first attempt may have been earlier): *No documented post hospital discharge outreach found in the last 14 days *No documented post hospital discharge outreach found in the last 14 days    Call initiated 2 business days of discharge: *No response recorded in the last 14 days    ASSESSMENT/PLAN:   Below is the assessment and plan developed based on review of pertinent history, physical exam, labs, studies, and medications.  Hospital discharge follow-up  -     IN DISCHARGE MEDS RECONCILED W/ CURRENT OUTPATIENT MED LIST    Medical Decision Making: moderate complexity  No follow-ups on file.    On this date 5/1/2025 I have spent 44 minutes reviewing previous notes, test results and face to face with the patient discussing the diagnosis and importance of compliance with the treatment plan as well as documenting on the day of the visit.       Assessment & Plan  Hospital discharge follow-up  Hospital notes received and reviewed.  Requested additional discharge instructions and medication recommendations    Orders:    External Referral To Home Health    IN DISCHARGE MEDS RECONCILED W/ CURRENT OUTPATIENT MED LIST    Generalized weakness  Requested referral for home health care services for PT/OT and home health aide.  [facility]  Orders:    External Referral To Home Health    Debility   Requested referral for home health care services for PT/OT and home health aide.  [facility]    Orders:    External Referral To Home Health    Chronic pulmonary

## 2025-05-01 NOTE — ASSESSMENT & PLAN NOTE
Was on Eliquis previously.  Questionable acute on chronic DVT during recent admission and is arousable and there, was switched to Lovenox.  Heme-onc evaluation recommended, patient not aware.    Orders:    Lupe Enriquez MD, Hematology Oncology, [place]

## 2025-05-01 NOTE — PROGRESS NOTES
Chief Complaint   Patient presents with    Pneumonia     Follow up from [facility] discharge   Pt is still having weakness in legs and will like Home PT ( pt has a  agency in mind to provide provider at time of appt. )      \"Have you been to the ER, urgent care clinic since your last visit?  Hospitalized since your last visit?\"    YES - When: approximately 1 months ago.  Where and Why: Pneumonia .    “Have you seen or consulted any other health care providers outside of [facility] since your last visit?”    NO            Click Here for Release of Records Request

## 2025-05-05 ENCOUNTER — TELEPHONE (OUTPATIENT)
Age: 70
End: 2025-05-05

## 2025-05-05 NOTE — TELEPHONE ENCOUNTER
NP\Chronic pulmonary embolism\Chronic deep vein thrombosis\Mahendra Reyes MD   LVM; patient to c/b to schedule  Delmy Rodriguez 6/18

## 2025-05-07 ENCOUNTER — TELEPHONE (OUTPATIENT)
Age: 70
End: 2025-05-07

## 2025-05-07 NOTE — TELEPHONE ENCOUNTER
NP\Chronic pulmonary embolism\Chronic deep vein thrombosis\Mahendra Reyes MD   [facility] Benita Armstrong to have pt call to schedule; pt's phone will not connect  Delmy Rodriguez

## 2025-06-23 ENCOUNTER — TELEPHONE (OUTPATIENT)
Age: 70
End: 2025-06-23

## 2025-06-23 NOTE — TELEPHONE ENCOUNTER
Pt called in requesting to cancel his appt. Pt stated he will be out of town. Offered to reschedule.Pt stated he will call back. colin       VIEW ALL No

## 2025-08-06 ENCOUNTER — TELEPHONE (OUTPATIENT)
Facility: CLINIC | Age: 70
End: 2025-08-06